# Patient Record
Sex: FEMALE | Race: WHITE | NOT HISPANIC OR LATINO | Employment: FULL TIME | URBAN - METROPOLITAN AREA
[De-identification: names, ages, dates, MRNs, and addresses within clinical notes are randomized per-mention and may not be internally consistent; named-entity substitution may affect disease eponyms.]

---

## 2017-06-16 DIAGNOSIS — Z00.00 ROUTINE GENERAL MEDICAL EXAMINATION AT A HEALTH CARE FACILITY: Primary | ICD-10-CM

## 2017-07-25 ENCOUNTER — TELEPHONE (OUTPATIENT)
Dept: OBSTETRICS AND GYNECOLOGY | Facility: CLINIC | Age: 27
End: 2017-07-25

## 2017-07-25 NOTE — TELEPHONE ENCOUNTER
----- Message from Anisha Marmolejo NP sent at 7/25/2017  8:56 AM CDT -----  Regarding: appt tomorrow  Please make sure pt is aware that she is not due for her annual exam until November 2017.     Thanks  anisha

## 2017-07-25 NOTE — TELEPHONE ENCOUNTER
"Attempted to contact pt. to inform her that he annual is not due until 11/2017. Informed pt that her insurance may not cover her annual until it's due but if she's having any problems are discomfort she can schedule a "problem visit" instead of an annual. No answer. Left message for pt to call clinic back.  "

## 2017-07-26 ENCOUNTER — HOSPITAL ENCOUNTER (OUTPATIENT)
Dept: PULMONOLOGY | Facility: CLINIC | Age: 27
Discharge: HOME | End: 2017-07-26

## 2017-07-26 ENCOUNTER — HOSPITAL ENCOUNTER (OUTPATIENT)
Dept: CARDIOLOGY | Facility: CLINIC | Age: 27
Discharge: HOME | End: 2017-07-26

## 2017-07-26 ENCOUNTER — CLINICAL SUPPORT (OUTPATIENT)
Dept: INTERNAL MEDICINE | Facility: CLINIC | Age: 27
End: 2017-07-26

## 2017-07-26 ENCOUNTER — OFFICE VISIT (OUTPATIENT)
Dept: PULMONOLOGY | Facility: CLINIC | Age: 27
End: 2017-07-26

## 2017-07-26 ENCOUNTER — CLINICAL SUPPORT (OUTPATIENT)
Dept: AUDIOLOGY | Facility: CLINIC | Age: 27
End: 2017-07-26

## 2017-07-26 DIAGNOSIS — Z01.10 ENCOUNTER FOR HEARING EXAMINATION WITHOUT ABNORMAL FINDINGS: Primary | ICD-10-CM

## 2017-07-26 DIAGNOSIS — Z00.00 ROUTINE GENERAL MEDICAL EXAMINATION AT A HEALTH CARE FACILITY: Primary | ICD-10-CM

## 2017-07-26 DIAGNOSIS — Z00.00 ROUTINE GENERAL MEDICAL EXAMINATION AT A HEALTH CARE FACILITY: ICD-10-CM

## 2017-07-26 DIAGNOSIS — Z00.00 ANNUAL PHYSICAL EXAM: Primary | ICD-10-CM

## 2017-07-26 LAB
PRE FEV1 FVC: 89
PRE FEV1: 3.08
PRE FVC: 3.47
PREDICTED FEV1 FVC: 85
PREDICTED FEV1: 3.2
PREDICTED FVC: 3.77

## 2017-07-26 PROCEDURE — 93000 ELECTROCARDIOGRAM COMPLETE: CPT | Mod: S$GLB,,, | Performed by: INTERNAL MEDICINE

## 2017-07-26 PROCEDURE — 92552 PURE TONE AUDIOMETRY AIR: CPT | Mod: ,,, | Performed by: AUDIOLOGIST

## 2017-07-26 PROCEDURE — 94010 BREATHING CAPACITY TEST: CPT | Mod: ,,, | Performed by: INTERNAL MEDICINE

## 2017-07-26 PROCEDURE — 99999 PR PBB SHADOW E&M-EST. PATIENT-LVL II: CPT | Mod: PBBFAC,,, | Performed by: INTERNAL MEDICINE

## 2017-07-26 PROCEDURE — 99385 PREV VISIT NEW AGE 18-39: CPT | Mod: ,,, | Performed by: INTERNAL MEDICINE

## 2017-07-26 NOTE — PROGRESS NOTES
Subjective:       Patient ID: Haley Henriquez is a 27 y.o. female.    Chief Complaint: Annual Exam    HPI  28 yo employee of [employer] comes for her periodic health exam.She is originally from [place], [place]. Went to the [facility] and has been in [place] for the past year.She feels well and has no medical complaints.  Review of Systems   Constitutional: Negative.    HENT: Negative.    Eyes: Negative.    Respiratory: Negative.    Cardiovascular: Negative.    Gastrointestinal: Negative.    Genitourinary: Negative.    Musculoskeletal: Negative.    Skin: Negative.    Neurological: Negative.    Psychiatric/Behavioral: Negative.    All other systems reviewed and are negative.      Objective:      Physical Exam   Constitutional: She is oriented to person, place, and time. She appears well-developed and well-nourished. No distress.   HENT:   Head: Normocephalic and atraumatic.   Right Ear: External ear normal.   Left Ear: External ear normal.   Nose: Nose normal.   Mouth/Throat: Oropharynx is clear and moist.   Sonogram is normal   Eyes: Conjunctivae and EOM are normal. Pupils are equal, round, and reactive to light.   Neck: Normal range of motion. Neck supple. No JVD present. No thyromegaly present.   Cardiovascular: Normal rate, regular rhythm, normal heart sounds and intact distal pulses.  Exam reveals no gallop.    No murmur heard.  EKG is normal   Pulmonary/Chest: Breath sounds normal. No stridor. No respiratory distress. She has no wheezes. She has no rales. She exhibits no tenderness.   Peak flow 440 l/min   Abdominal: Soft. Bowel sounds are normal. She exhibits no distension and no mass. There is no tenderness. There is no rebound and no guarding.   Musculoskeletal: Normal range of motion. She exhibits no edema.   Lymphadenopathy:     She has no cervical adenopathy.   Neurological: She is alert and oriented to person, place, and time. She has normal reflexes. She displays normal  reflexes. No cranial nerve deficit.   Skin: Skin is warm and dry. No rash noted.   Psychiatric: She has a normal mood and affect. Her behavior is normal. Judgment and thought content normal.   Nursing note and vitals reviewed.      Assessment:       No diagnosis found.    Plan:        Labs: Triglycerides, 173 and cholesterol, 201, the [employer] minimally elevated All other parameters are normal. PFT's normal, EKG is normal and audiogram is normal.

## 2017-07-26 NOTE — PROGRESS NOTES
Normal hearing bilaterally.  Recommend a yearly hearing evaluation and hearing protection when needed.

## 2017-09-15 DIAGNOSIS — Z30.9 ENCOUNTER FOR CONTRACEPTIVE MANAGEMENT, UNSPECIFIED TYPE: Primary | ICD-10-CM

## 2017-09-15 RX ORDER — LEVONORGESTREL AND ETHINYL ESTRADIOL 0.15-0.03
1 KIT ORAL DAILY
Qty: 90 TABLET | Refills: 0 | Status: CANCELLED | OUTPATIENT
Start: 2017-09-15

## 2017-09-15 NOTE — TELEPHONE ENCOUNTER
----- Message from Muna Loya sent at 9/15/2017 10:45 AM CDT -----  Contact: self  Pt needing a refill of her BC, pt [facility] [address] [facility], she can be reached at [phone].

## 2017-09-15 NOTE — TELEPHONE ENCOUNTER
----- Message from Muna Loya sent at 9/15/2017 10:45 AM CDT -----  Contact: self  Pt needing a refill of her BC, [facility] [address] [facility], she can be reached at [phone].

## 2017-09-18 RX ORDER — LEVONORGESTREL AND ETHINYL ESTRADIOL 0.15-0.03
1 KIT ORAL DAILY
Qty: 90 TABLET | Refills: 0 | Status: SHIPPED | OUTPATIENT
Start: 2017-09-18 | End: 2017-11-27 | Stop reason: SDUPTHER

## 2017-11-07 ENCOUNTER — OFFICE VISIT (OUTPATIENT)
Dept: ORTHOPEDICS | Facility: CLINIC | Age: 27
End: 2017-11-07
Payer: COMMERCIAL

## 2017-11-07 ENCOUNTER — HOSPITAL ENCOUNTER (OUTPATIENT)
Dept: RADIOLOGY | Facility: HOSPITAL | Age: 27
Discharge: HOME | End: 2017-11-07
Attending: PHYSICIAN ASSISTANT
Payer: COMMERCIAL

## 2017-11-07 DIAGNOSIS — M25.561 RIGHT KNEE PAIN, UNSPECIFIED CHRONICITY: Primary | ICD-10-CM

## 2017-11-07 DIAGNOSIS — M25.561 RIGHT KNEE PAIN, UNSPECIFIED CHRONICITY: ICD-10-CM

## 2017-11-07 PROCEDURE — 73560 X-RAY EXAM OF KNEE 1 OR 2: CPT | Mod: 26,59,LT, | Performed by: RADIOLOGY

## 2017-11-07 PROCEDURE — 99203 OFFICE O/P NEW LOW 30 MIN: CPT | Mod: S$PBB,,, | Performed by: PHYSICIAN ASSISTANT

## 2017-11-07 PROCEDURE — 73562 X-RAY EXAM OF KNEE 3: CPT | Mod: 26,RT,, | Performed by: RADIOLOGY

## 2017-11-07 PROCEDURE — 73560 X-RAY EXAM OF KNEE 1 OR 2: CPT | Mod: TC,LT

## 2017-11-07 PROCEDURE — 99999 PR PBB SHADOW E&M-EST. PATIENT-LVL II: CPT | Mod: PBBFAC,,, | Performed by: PHYSICIAN ASSISTANT

## 2017-11-08 NOTE — PROGRESS NOTES
Subjective:      Patient ID: Haley Henriquez is a 27 y.o. female.    Chief Complaint: No chief complaint on file.    HPI    Patient is a 27 year old female who has had a catching sensation in her right knee since July 2017. Patient stated that it is a catching sensation. She denied any trauma. She has not taken any medication for this. She denied numbness or tingling.      Review of Systems   Constitution: Negative for chills and fever.   Cardiovascular: Negative for chest pain.   Respiratory: Negative for cough and shortness of breath.    Skin: Negative for color change, dry skin, itching, nail changes, poor wound healing and rash.   Musculoskeletal:        Right knee pain   Neurological: Negative for dizziness.   Psychiatric/Behavioral: Negative for altered mental status. The patient is not nervous/anxious.    All other systems reviewed and are negative.        Objective:      General    Constitutional: She is oriented to person, place, and time. She appears well-developed and well-nourished. No distress.   HENT:   Head: Atraumatic.   Eyes: Conjunctivae are normal.   Cardiovascular: Normal rate.    Pulmonary/Chest: Effort normal.   Neurological: She is alert and oriented to person, place, and time.   Psychiatric: She has a normal mood and affect. Her behavior is normal.           Right Knee Exam     Inspection   Swelling: absent  Bruising: absent    Range of Motion   The patient has normal right knee ROM.    Tests   Meniscus   Trever:  Medial - positive Lateral - negative  Ligament Examination Lachman: normal (-1 to 2mm) PCL-Posterior Drawer: normal (0 to 2mm)     MCL - Valgus: normal (0 to 2mm)  LCL - Varus: normal    Other   Sensation: normal            RADS: no fracture or dislocation.   Assessment:       Encounter Diagnosis   Name Primary?    Right knee pain, unspecified chronicity Yes          Plan:       Discussed treatment options with patient. At this time we will order an MRi of her knee. She is to  call for results. Follow up is pending results.

## 2017-11-13 ENCOUNTER — TELEPHONE (OUTPATIENT)
Dept: ORTHOPEDICS | Facility: CLINIC | Age: 27
End: 2017-11-13

## 2017-11-13 ENCOUNTER — HOSPITAL ENCOUNTER (OUTPATIENT)
Dept: RADIOLOGY | Facility: HOSPITAL | Age: 27
Discharge: HOME | End: 2017-11-13
Attending: PHYSICIAN ASSISTANT
Payer: COMMERCIAL

## 2017-11-13 DIAGNOSIS — M25.561 RIGHT KNEE PAIN, UNSPECIFIED CHRONICITY: ICD-10-CM

## 2017-11-13 PROCEDURE — 73721 MRI JNT OF LWR EXTRE W/O DYE: CPT | Mod: 26,RT,, | Performed by: RADIOLOGY

## 2017-11-13 PROCEDURE — 73721 MRI JNT OF LWR EXTRE W/O DYE: CPT | Mod: TC,RT

## 2017-11-13 NOTE — TELEPHONE ENCOUNTER
----- Message from Violeta Conklin PA-C sent at 11/13/2017  1:43 PM CST -----  Contact: [email or web address] number  Please make appointment with .   Thanks,   Violeta.     ----- Message -----  From: Aniya Marino MA  Sent: 11/13/2017   8:41 AM  To: Violeta Conklin PA-C        ----- Message -----  From: Laci Morrell  Sent: 11/13/2017   8:31 AM  To: [facility]in [facility] Staff    Pt called in advising that she has completed her MRI.    Thank you

## 2017-11-13 NOTE — TELEPHONE ENCOUNTER
Called and Informed patient of appointment on 11/14/17 at 8:30 am. Patient states verbal understanding and has no further questions.

## 2017-11-14 ENCOUNTER — OFFICE VISIT (OUTPATIENT)
Dept: SPORTS MEDICINE | Facility: CLINIC | Age: 27
End: 2017-11-14
Payer: COMMERCIAL

## 2017-11-14 VITALS
HEIGHT: 67 IN | WEIGHT: 125 LBS | HEART RATE: 62 BPM | SYSTOLIC BLOOD PRESSURE: 106 MMHG | DIASTOLIC BLOOD PRESSURE: 59 MMHG | BODY MASS INDEX: 19.62 KG/M2

## 2017-11-14 DIAGNOSIS — M25.561 ACUTE PAIN OF RIGHT KNEE: Primary | ICD-10-CM

## 2017-11-14 PROCEDURE — 99203 OFFICE O/P NEW LOW 30 MIN: CPT | Mod: S$GLB,,, | Performed by: ORTHOPAEDIC SURGERY

## 2017-11-14 PROCEDURE — 99999 PR PBB SHADOW E&M-EST. PATIENT-LVL III: CPT | Mod: PBBFAC,,, | Performed by: ORTHOPAEDIC SURGERY

## 2017-11-14 NOTE — LETTER
November 16, 2017      Violeta Conklin PA-C  [address]  [facility] [address]           [facility]  [address]  [facility] [address]  Phone: [phone]          Patient: Haley Henriquez   MR Number: [id number]   YOB: 1990   Date of Visit: 11/14/2017       Dear Violeta Conklin:    Thank you for referring Haley Henriquez to me for evaluation. Attached you will find relevant portions of my assessment and plan of care.    If you have questions, please do not hesitate to call me. I look forward to following Haley Henriquez along with you.    Sincerely,    Tomy Johns MD    Enclosure  CC:  No Recipients    If you would like to receive this communication electronically, please contact [email or web address] or [phone] to request more information on [facility] Link access.    For providers and/or their staff who would like to refer a patient to [facility], please contact us through our one-stop-shop provider referral line, [facility] , at [phone].    If you feel you have received this communication in error or would no longer like to receive these types of communications, please e-mail [email or web address]

## 2017-11-14 NOTE — PROGRESS NOTES
CC: Right knee pain    27 y.o. Female with a history of right knee pain who presents for initial evaluation. She has had right knee pain for 4 months. Denies trauma or inciting event but states she was on vacation over the summer and did a lot of walking. Pain is not improved. She feels occasional buckling and clicking. Denies locking or catching however. She has limited work outs because of the pain and concern that she may injure herself further.  She states that the pain is severe and not responding to any conservative care.      She reports that the pain and weakness. It does not bother her at night.    pos mechanical symptoms, pos instability    Is affecting ADLs.  Pain is 5/10 at it's worst.    REVIEW OF SYSTEMS:   Constitution: Negative. Negative for chills, fever and night sweats.   HENT: Negative for congestion and headaches.    Eyes: Negative for blurred vision, left vision loss and right vision loss.   Cardiovascular: Negative for chest pain and syncope.   Respiratory: Negative for cough and shortness of breath.    Endocrine: Negative for polydipsia, polyphagia and polyuria.   Hematologic/Lymphatic: Negative for bleeding problem. Does not bruise/bleed easily.   Skin: Negative for dry skin, itching and rash.   Musculoskeletal: Negative for falls. Positive for right knee pain and  muscle weakness.   Gastrointestinal: Negative for abdominal pain and bowel incontinence.   Genitourinary: Negative for bladder incontinence and nocturia.   Neurological: Negative for disturbances in coordination, loss of balance and seizures.   Psychiatric/Behavioral: Negative for depression. The patient does not have insomnia.    Allergic/Immunologic: Negative for hives and persistent infections.     PAST MEDICAL HISTORY:   Past Medical History:   Diagnosis Date    Asthma     BRCA2 positive        PAST SURGICAL HISTORY:   Past Surgical History:   Procedure Laterality Date    COLPOSCOPY         FAMILY HISTORY:   Family History  "  Problem Relation Age of Onset    Breast cancer Maternal Aunt     BRCA 1/2 Mother     Ovarian cancer Neg Hx     Colon cancer Neg Hx        SOCIAL HISTORY:   Social History     Social History    Marital status: Single     Spouse name: N/A    Number of children: N/A    Years of education: N/A     Occupational History    Not on file.     Social History Main Topics    Smoking status: Never Smoker    Smokeless tobacco: Never Used    Alcohol use Yes    Drug use: No    Sexual activity: Yes     Partners: Male     Birth control/ protection: OCP     Other Topics Concern    Not on file     Social History Narrative    No narrative on file       MEDICATIONS:     Current Outpatient Prescriptions:     MARLISSA 0.15-0.03 mg per tablet, Take 1 tablet by mouth once daily., Disp: 90 tablet, Rfl: 0    ALLERGIES:   Review of patient's allergies indicates:  No Known Allergies    VITAL SIGNS:   BP (!) 106/59   Pulse 62   Ht 5' 7" (1.702 m)   Wt 56.7 kg (125 lb)   BMI 19.58 kg/m²      PHYSICAL EXAMINATION:  General:  The patient is alert and oriented x 3.  Mood is pleasant.  Observation of ears, eyes and nose reveal no gross abnormalities.  No labored breathing observed.    RIGHT KNEE EXAMINATION     OBSERVATION / INSPECTION   Gait:   Nonantalgic   Alignment:  Neutral   Scars:   None   Muscle atrophy: Mild  Effusion:  None   Warmth:  None   Discoloration:   none     TENDERNESS / CREPITUS (T / C):          T / C      T / C   Patella   - / -   Lateral joint line   +/ -   Peripatellar medial  -  Medial joint line    - / -   Peripatellar lateral +  Medial plica   - / -   Patellar tendon -   Popliteal fossa  - / -   Quad tendon   -   Gastrocnemius   -   Prepatellar Bursa - / -   Quadricep   -   Tibial tubercle  -  Thigh/hamstring  -   Pes anserine/HS -  Fibula    -   ITB   - / -  Tibia     -   Tib/fib joint  - / -  LCL    -     MFC   - / -   MCL: Proximal  -    LFC   - / -    Distal   -          ROM: (* = " pain)  PASSIVE   ACTIVE    Left :   5 / 0 / 145   5 / 0 / 145     Right :    0* / 2 / 145   0* / 2 / 145    Patellofemoral examination:  See above noted areas of tenderness.   Patella position    Subluxation / dislocation: Centered           Sup. / Inf;   Normal   Crepitus (PF):    Absent   Patellar Mobility:       Medial-lateral:   Normal    Superior-inferior:  Normal    Inferior tilt   Normal    Patellar tendon:  Normal   Lateral tilt:    Normal   J-sign:     None   Patellofemoral grind:   No pain       MENISCAL SIGNS:     Pain on terminal extension:  +  Pain on terminal flexion:  -  Trevers maneuver:  + (for pain lateral meniscus)  Squat     - (for pain)    LIGAMENT EXAMINATION:  ACL / Lachman:  normal (-1 to 2mm)    PCL-Post.  drawer: normal 0 to 2mm  MCL- Valgus:  normal 0 to 2mm  LCL- Varus:  normal 0 to 2mm  Pivot shift: normal (Equal)   Dial Test: difference c/w other side   At 30° flexion: normal (< 5°)    At 90° flexion: normal (< 5°)   Reverse Pivot Shift:   normal (Equal)     STRENGTH: (* = with pain) PAINFUL SIDE   Quadricep   5/5   Hamstrin/5    EXTREMITY NEURO-VASCULAR EXAMINATION:   Sensation:  Grossly intact to light touch all dermatomal regions.   Motor Function:  Fully intact motor function at hip, knee, foot and ankle    DTRs;  quadriceps and  achilles 2+.  No clonus and downgoing Babinski.    Vascular status:  DP and PT pulses 2+, brisk capillary refill, symmetric.     OTHER FINDINGS:  none    IMAGING:    X-rays including standing, weight bearing AP and flexion bilateral knees, lateral and merchant views ordered and images reviewed by me show:    No fracture, dislocation or other pathology   Medial compartment: no degenerative changes   Lateral compartment: no degenerative changes   Patellofemoral compartment: no degenerative changes     MRI shows possible anterior horn lateral meniscus tear       ASSESSMENT:    Right Knee Pain, Probable anterior horn lateral meniscus tear    PLAN:    1. Discussed surgery vs PT and injections. Patient has tried OTC and activity modification without relief, currently affecting ADL  2. Treatment options were discussed with the patient about her knee.  I reviewed the MRI images with her, including the relevant anatomy, and what this means for his knee.    We discussed both non-operative and operative options for her knee and the risks and benefits of each.    I feel like she would benefit from surgery for her knee.  After a discussion of specific risks and benefits, she would like to proceed with setting this up.    These risks include: bleeding, infection, scarring, damage to neurovascular structures, damage to cartilage, stiffness, blood clots, pulmonary embolism, swelling, compartment syndrome, need for further surgery, and the risks of anesthesia.      She verbalized her understanding of these risks and wished to proceed with surgery.    A total of 25 minutes were spent face-to-face with the patient during this encounter and over half of that time was spent on counseling about treatment options including his choice for surgery and coordination of her care for her preoperative visits, surgery and post-operative rehab.  We also had a detailed discussion of her expectation level for this procedure as well as any limitations at home or work and with exercising following surgery.     The operative plan is:    right   1. Arthroscopic partial lateral meniscectomy vs repair  2. Possible arthroscopic synovectomy  3. Possible arthroscopic loose body removal  4. Possible arthroscopic chondroplasty    Position: Supine    Patient will not  need medical clearance prior to the pre-operative appointment.    If she wishes to proceed, we'll get her scheduled for this at her convenience around her work schedule.      All questions were answered, pt will contact us for questions or concerns in the interim.

## 2017-11-16 DIAGNOSIS — M94.261 CHONDROMALACIA OF RIGHT KNEE: ICD-10-CM

## 2017-11-16 DIAGNOSIS — M23.200 OLD TEAR OF LATERAL MENISCUS OF RIGHT KNEE, UNSPECIFIED TEAR TYPE: Primary | ICD-10-CM

## 2017-11-17 ENCOUNTER — OFFICE VISIT (OUTPATIENT)
Dept: SPORTS MEDICINE | Facility: CLINIC | Age: 27
End: 2017-11-17
Payer: COMMERCIAL

## 2017-11-17 VITALS
BODY MASS INDEX: 19.62 KG/M2 | HEART RATE: 70 BPM | HEIGHT: 67 IN | WEIGHT: 125 LBS | SYSTOLIC BLOOD PRESSURE: 105 MMHG | DIASTOLIC BLOOD PRESSURE: 58 MMHG

## 2017-11-17 DIAGNOSIS — M23.200 OLD TEAR OF LATERAL MENISCUS OF RIGHT KNEE, UNSPECIFIED TEAR TYPE: Primary | ICD-10-CM

## 2017-11-17 DIAGNOSIS — M94.261 CHONDROMALACIA OF RIGHT KNEE: ICD-10-CM

## 2017-11-17 PROCEDURE — 99999 PR PBB SHADOW E&M-EST. PATIENT-LVL III: CPT | Mod: PBBFAC,,, | Performed by: PHYSICIAN ASSISTANT

## 2017-11-17 PROCEDURE — 99499 UNLISTED E&M SERVICE: CPT | Mod: S$GLB,,, | Performed by: PHYSICIAN ASSISTANT

## 2017-11-17 NOTE — H&P
Haley Henriquez  is here for a completion of her perioperative paperwork. she  Is scheduled to undergo     right   1. Arthroscopic partial lateral meniscectomy vs repair  2. Possible arthroscopic synovectomy  3. Possible arthroscopic loose body removal  4. Possible arthroscopic chondroplasty    on 12/8/17.  She is a healthy individual and does not need clearance for this procedure.     Risks, indications and benefits of the surgical procedure were discussed with the patient. All questions with regard to surgery, rehab, expected return to functional activities, activities of daily living and recreational endeavors were answered to her satisfaction.    Once no other questions were asked, a brief history and physical exam was then performed.      PHYSICAL EXAM:  GEN: A&Ox3, WD WN NAD  HEENT: WNL  CHEST: CTAB, no W/R/R  HEART: RRR, no M/R/G  ABD: Soft, NT ND, BS x4 QUADS  MS; See Epic  NEURO: CN II-XII intact       The surgical consent was then reviewed with the patient, who agreed with all the contents of the consent form and it was signed. she was then given the [facility] surgery packet to bring with her to [facility] for the anesthesia portion of her perioperative paperwork.     PHYSICAL THERAPY:  She was also instructed regarding physical therapy and will begin on  POD #1. She was given a copy of the original prescription to schedule. Another copy of this prescription was also faxed to [facility].    POST OP CARE:instructions were reviewed including care of the wound and dressing after surgery and when she can shower.     PAIN MANAGEMENT: Haley Henriquez was also given her pain management regime, which includes the TENS unit given to her by Luis M along with the education required for its use. She was also instructed regarding the Polar ice unit that will be in place after surgery and her postoperative pain medications.     PAIN MEDICATION:  Percocet 10/325mg 1 po q 4-6 hours prn pain  Ultram 50 mg  one p.o. q.4-6 hours p.r.n. breakthrough pain,   Phenergan 25 mg one p.o. q.4-6 hours p.r.n. nausea and vomiting.  Aspirin 325mg BID x 3 weeks    Patient was instructed to purchase and take Colace to counter possible GI side effects of taking opiates.     DVT prophylaxis was discussed with the patient today including risk factors for developing DVTs and history of DVTs. The patient was asked if any specific recommendations were given from the doctor/s that did pre-operative surgical clearance.      The below listed DVT prophylaxis regimen along with bilateral LEV compression stockings will be used post-op.      Aspirin 325mg BID x 3 weeks for DVT prophylaxis starting on the evening after surgery.    Patient will also use bilateral TEDs on lower extremities, SCDs during surgery, and early ambulation post-op.     Patient was also told to buy over the counter Prilosec medication and take it once daily for GI protection as long as they are taking NSAIDs or Aspirin.     The patient was told that narcotic pain medications may make them drowsy and instructions were given to not sign legal documents, drive or operate heavy machinery, cars, or equipment while under the influence of narcotic medications.     As there were no other questions to be asked, she was given my business card along with Tomy Johns MD business card if she has any questions or concerns prior to surgery or in the postop period.

## 2017-11-19 RX ORDER — ASPIRIN 325 MG
325 TABLET ORAL 2 TIMES DAILY
Qty: 42 TABLET | Refills: 0 | Status: SHIPPED | OUTPATIENT
Start: 2017-11-19 | End: 2018-12-12

## 2017-11-19 RX ORDER — PROMETHAZINE HYDROCHLORIDE 25 MG/1
25 TABLET ORAL EVERY 6 HOURS PRN
Qty: 40 TABLET | Refills: 0 | Status: SHIPPED | OUTPATIENT
Start: 2017-11-19 | End: 2018-12-12

## 2017-11-19 RX ORDER — TRAMADOL HYDROCHLORIDE 50 MG/1
50 TABLET ORAL EVERY 6 HOURS PRN
Qty: 40 TABLET | Refills: 0 | Status: SHIPPED | OUTPATIENT
Start: 2017-11-19 | End: 2018-12-12

## 2017-11-19 RX ORDER — HYDROCODONE BITARTRATE AND ACETAMINOPHEN 10; 325 MG/1; MG/1
1 TABLET ORAL EVERY 6 HOURS PRN
Qty: 45 TABLET | Refills: 0 | Status: SHIPPED | OUTPATIENT
Start: 2017-11-19 | End: 2018-12-12

## 2017-11-20 ENCOUNTER — ANESTHESIA EVENT (OUTPATIENT)
Dept: SURGERY | Facility: OTHER | Age: 27
End: 2017-11-20
Payer: COMMERCIAL

## 2017-11-20 ENCOUNTER — HOSPITAL ENCOUNTER (OUTPATIENT)
Dept: PREADMISSION TESTING | Facility: OTHER | Age: 27
Discharge: HOME | End: 2017-11-20
Attending: ORTHOPAEDIC SURGERY
Payer: COMMERCIAL

## 2017-11-20 VITALS
SYSTOLIC BLOOD PRESSURE: 97 MMHG | HEIGHT: 66 IN | TEMPERATURE: 98 F | HEART RATE: 61 BPM | DIASTOLIC BLOOD PRESSURE: 59 MMHG | WEIGHT: 125 LBS | OXYGEN SATURATION: 98 % | BODY MASS INDEX: 20.09 KG/M2

## 2017-11-20 RX ORDER — FAMOTIDINE 20 MG/1
20 TABLET, FILM COATED ORAL
Status: CANCELLED | OUTPATIENT
Start: 2017-11-20 | End: 2017-11-20

## 2017-11-20 RX ORDER — FLUTICASONE PROPIONATE AND SALMETEROL 250; 50 UG/1; UG/1
1 POWDER RESPIRATORY (INHALATION) 2 TIMES DAILY
COMMUNITY
End: 2019-02-05

## 2017-11-20 RX ORDER — ALBUTEROL SULFATE 90 UG/1
2 AEROSOL, METERED RESPIRATORY (INHALATION) EVERY 6 HOURS PRN
COMMUNITY
End: 2019-12-16 | Stop reason: SDUPTHER

## 2017-11-20 RX ORDER — SODIUM CHLORIDE, SODIUM LACTATE, POTASSIUM CHLORIDE, CALCIUM CHLORIDE 600; 310; 30; 20 MG/100ML; MG/100ML; MG/100ML; MG/100ML
INJECTION, SOLUTION INTRAVENOUS CONTINUOUS
Status: CANCELLED | OUTPATIENT
Start: 2017-11-20

## 2017-11-20 RX ORDER — LIDOCAINE HYDROCHLORIDE 10 MG/ML
1 INJECTION, SOLUTION EPIDURAL; INFILTRATION; INTRACAUDAL; PERINEURAL ONCE
Status: CANCELLED | OUTPATIENT
Start: 2017-11-20 | End: 2017-11-20

## 2017-11-20 RX ORDER — ALBUTEROL SULFATE 0.83 MG/ML
2.5 SOLUTION RESPIRATORY (INHALATION)
Status: CANCELLED | OUTPATIENT
Start: 2017-11-20 | End: 2017-11-20

## 2017-11-20 NOTE — PRE-PROCEDURE INSTRUCTIONS
"Reviewed 's knee arthroscopy discharge instructions and demonstrated the postoperative equipment (polar ice and BREG t-scope knee brace), with verbalized understanding per patient.Reviewed          crutch teaching with verbalized understanding and  return demonstration per patient.  Fitted for crutches and hand  adjusted.  Height of crutches set on 5'7" and the height of the hand  placed on the 2nd hole from the top of the crutch..                         .  "

## 2017-11-20 NOTE — DISCHARGE INSTRUCTIONS
PRE-ADMIT TESTING -  [phone]    [address]  [facility]          Your surgery has been scheduled at [facility]. We are pleased to have the opportunity to serve you. For Further Information please call [phone].    On the day of surgery please report to the Information Desk on the 1st floor.    · CONTACT YOUR PHYSICIAN'S OFFICE THE DAY PRIOR TO YOUR SURGERY TO OBTAIN YOUR ARRIVAL TIME.     · The evening before surgery do not eat anything after 9 p.m. ( this includes hard candy, chewing gum and mints).  You may only have GATORADE, POWERADE AND WATER  from 9 p.m. until you leave your home.   DO NOT DRINK ANY LIQUIDS ON THE WAY TO THE HOSPITAL.      SPECIAL MEDICATION INSTRUCTIONS: TAKE medications checked off by the Anesthesiologist on your Medication List.    Angiogram Patients: Take medications as instructed by your physician, including aspirin.     Surgery Patients:    If you take ASPIRIN - Your PHYSICIAN/SURGEON will need to inform you IF/OR when you need to stop taking aspirin prior to your surgery.     Do Not take any medications containing IBUPROFEN.  Do Not Wear any make-up or dark nail polish   (especially eye make-up) to surgery. If you come to surgery with makeup on you will be required to remove the makeup or nail polish.    Do not shave your surgical area at least 5 days prior to your surgery. The surgical prep will be performed at the hospital according to Infection Control regulations.    Leave all valuables at home.   Do Not wear any jewelry or watches, including any metal in body piercings.  Contact Lens must be removed before surgery. Either do not wear the contact lens or bring a case and solution for storage.  Please bring a container for eyeglasses or dentures as required.  Bring any paperwork your physician has provided, such as consent forms,  history and physicals, doctor's orders, etc.   Bring comfortable clothes that are loose fitting to wear upon  discharge. Take into consideration the type of surgery being performed.  Maintain your diet as advised per your physician the day prior to surgery.      Adequate rest the night before surgery is advised.   Park in the Parking lot behind the hospital or in the [facility] across the street from the parking lot. Parking is complimentary.  If you will be discharged the same day as your procedure, please arrange for a responsible adult to drive you home or to accompany you if traveling by taxi.   YOU WILL NOT BE PERMITTED TO DRIVE OR TO LEAVE THE HOSPITAL ALONE AFTER SURGERY.   It is strongly recommended that you arrange for someone to remain with you for the first 24 hrs following your surgery.       Thank you for your cooperation.  The Staff of [facility].        Bathing Instructions                                                                 Please shower the evening before and morning of your procedure with    ANTIBACTERIAL SOAP. ( DIAL, etc )  Concentrate on the surgical area   for at least 3 minutes and rinse completely. Dry off as usual.   Do not use any deodorant, powder, body lotions, perfume, after shave or    cologne.

## 2017-11-20 NOTE — ANESTHESIA PREPROCEDURE EVALUATION
11/20/2017  Haley Henriquez is a 27 y.o., female.    Anesthesia Evaluation    I have reviewed the Patient Summary Reports.    I have reviewed the Nursing Notes.   I have reviewed the Medications.     Review of Systems  Anesthesia Hx:  Denies Family Hx of Anesthesia complications.   Denies Personal Hx of Anesthesia complications.   Social:  Non-Smoker    Hematology/Oncology:  Hematology Normal   Oncology Normal     EENT/Dental:EENT/Dental Normal   Cardiovascular:  Cardiovascular Normal     Pulmonary:   Asthma (seasonal, worse in winter) mild    Renal/:  Renal/ Normal     Hepatic/GI:  Hepatic/GI Normal    Musculoskeletal:  Musculoskeletal Normal    Neurological:  Neurology Normal    Endocrine:  Endocrine Normal    Dermatological:  Skin Normal    Psych:  Psychiatric Normal           Physical Exam  General:  Well nourished    Airway/Jaw/Neck:  Airway Findings: Mouth Opening: Normal Mallampati: I  TM Distance: 4 - 6 cm  Jaw/Neck Findings:  Neck ROM: Normal ROM      Dental:  Dental Findings: In tact        Mental Status:  Mental Status Findings:  Cooperative, Alert and Oriented         Anesthesia Plan  Type of Anesthesia, risks & benefits discussed:  Anesthesia Type:  general  Patient's Preference:   Intra-op Monitoring Plan: standard ASA monitors  Intra-op Monitoring Plan Comments:   Post Op Pain Control Plan:   Post Op Pain Control Plan Comments:   Induction:   IV  Beta Blocker:         Informed Consent: Patient understands risks and agrees with Anesthesia plan.  Questions answered. Anesthesia consent signed with patient.  ASA Score: 2     Day of Surgery Review of History & Physical:    H&P update referred to the surgeon.         Ready For Surgery From Anesthesia Perspective.

## 2017-11-27 ENCOUNTER — OFFICE VISIT (OUTPATIENT)
Dept: OBSTETRICS AND GYNECOLOGY | Facility: CLINIC | Age: 27
End: 2017-11-27
Attending: OBSTETRICS & GYNECOLOGY
Payer: COMMERCIAL

## 2017-11-27 VITALS — SYSTOLIC BLOOD PRESSURE: 90 MMHG | DIASTOLIC BLOOD PRESSURE: 60 MMHG | WEIGHT: 127.88 LBS | BODY MASS INDEX: 20.64 KG/M2

## 2017-11-27 DIAGNOSIS — Z01.419 VISIT FOR GYNECOLOGIC EXAMINATION: Primary | ICD-10-CM

## 2017-11-27 DIAGNOSIS — Z15.09 BRCA2 POSITIVE: ICD-10-CM

## 2017-11-27 DIAGNOSIS — Z15.01 BRCA2 POSITIVE: ICD-10-CM

## 2017-11-27 DIAGNOSIS — Z30.41 ENCOUNTER FOR SURVEILLANCE OF CONTRACEPTIVE PILLS: ICD-10-CM

## 2017-11-27 PROCEDURE — 99999 PR PBB SHADOW E&M-EST. PATIENT-LVL III: CPT | Mod: PBBFAC,,, | Performed by: OBSTETRICS & GYNECOLOGY

## 2017-11-27 PROCEDURE — 99395 PREV VISIT EST AGE 18-39: CPT | Mod: S$GLB,,, | Performed by: OBSTETRICS & GYNECOLOGY

## 2017-11-27 RX ORDER — LEVONORGESTREL AND ETHINYL ESTRADIOL 0.15-0.03
1 KIT ORAL DAILY
Qty: 90 TABLET | Refills: 3 | Status: SHIPPED | OUTPATIENT
Start: 2017-11-27 | End: 2018-10-14 | Stop reason: SDUPTHER

## 2017-11-27 NOTE — PROGRESS NOTES
CC: Well woman exam    Haley Henriquez is a 27 y.o. female  presents for a well woman exam.  She has no issues, problems, or complaints.    She is BRCA2 positive.  She had a MMG last year with her well woman exam      Past Medical History:   Diagnosis Date    Asthma     BRCA2 positive        Past Surgical History:   Procedure Laterality Date    COLPOSCOPY         OB History    Para Term  AB Living   0 0 0 0 0 0   SAB TAB Ectopic Multiple Live Births   0 0 0 0               Family History   Problem Relation Age of Onset    Breast cancer Maternal Aunt     BRCA 1/2 Mother     Ovarian cancer Maternal Grandmother     Colon cancer Neg Hx        Social History   Substance Use Topics    Smoking status: Never Smoker    Smokeless tobacco: Never Used    Alcohol use Yes      Comment: socially       BP 90/60   Wt 58 kg (127 lb 13.9 oz)   LMP 2017 (Approximate)   BMI 20.64 kg/m²     ROS:  GENERAL: Denies weight gain or weight loss. Feeling well overall.   SKIN: Denies rash or lesions.   HEAD: Denies head injury or headache.   NODES: Denies enlarged lymph nodes.   CHEST: Denies chest pain or shortness of breath.   CARDIOVASCULAR: Denies palpitations or left sided chest pain.   ABDOMEN: No abdominal pain, constipation, diarrhea, nausea, vomiting or rectal bleeding.   URINARY: No frequency, dysuria, hematuria, or burning on urination.  REPRODUCTIVE: See HPI.   BREASTS: The patient performs breast self-examination and denies pain, lumps, or nipple discharge.   HEMATOLOGIC: No easy bruisability or excessive bleeding.  MUSCULOSKELETAL: Denies joint pain or swelling.   NEUROLOGIC: Denies syncope or weakness.   PSYCHIATRIC: Denies depression, anxiety or mood swings.    Physical Exam:    APPEARANCE: Well nourished, well developed, in no acute distress.  AFFECT: WNL, alert and oriented x 3  SKIN: No acne or hirsutism  NECK: Neck symmetric without masses or thyromegaly  NODES: No inguinal,  cervical, axillary, or femoral lymph node enlargement  CHEST: Good respiratory effect  ABDOMEN: Soft.  No tenderness or masses.  No hepatosplenomegaly.  No hernias.  BREASTS: Symmetrical, no skin changes or visible lesions.  No palpable masses, nipple discharge bilaterally.  PELVIC: Normal external genitalia without lesions.  Normal hair distribution.  Adequate perineal body, normal urethral meatus.  Vagina moist and well rugated without lesions or discharge.  Cervix pink, without lesions, discharge or tenderness.  No significant cystocele or rectocele.  Bimanual exam shows uterus to be normal size, regular, mobile and nontender.  Adnexa without masses or tenderness.    EXTREMITIES: No edema.    ASSESSMENT AND PLAN  1. Visit for gynecologic examination     2. BRCA2 positive  MRI Breast Bilateral   3. Encounter for surveillance of contraceptive pills  MARLISSA 0.15-0.03 mg per tablet       Patient was counseled today on A.C.S. Pap guidelines and recommendations for yearly pelvic exams, pap smears every 3 years, and monthly self breast exams; to see her PCP for other health maintenance.     Discussed the surveillance recommendations for women with BRCA 2 for breast cancer:    For women aged 25-29 years with known BRCA mutations, recommended breast cancer surveillance includes clinical breast examination every 6-12 months and annual radiographic screening (preferably, magnetic resonance imaging [MRI] with contrast) (17).   For women aged 30 years and older with known BRCA mutations or other actionable breast cancer mutations, recommended breast cancer surveillance includes annual mammography and annual breast MRI with contrast, often alternating every 6 months (17).     And the use of OCP's to reduce the risk of ovarian cancer.       Return in about 1 year (around 11/27/2018).

## 2017-12-05 ENCOUNTER — HOSPITAL ENCOUNTER (OUTPATIENT)
Dept: RADIOLOGY | Facility: OTHER | Age: 27
Discharge: HOME | End: 2017-12-05
Attending: OBSTETRICS & GYNECOLOGY
Payer: COMMERCIAL

## 2017-12-05 DIAGNOSIS — Z15.01 BRCA2 POSITIVE: ICD-10-CM

## 2017-12-05 DIAGNOSIS — Z15.09 BRCA2 POSITIVE: ICD-10-CM

## 2017-12-05 PROCEDURE — A9577 INJ MULTIHANCE: HCPCS | Performed by: OBSTETRICS & GYNECOLOGY

## 2017-12-05 PROCEDURE — 25500020 PHARM REV CODE 255: Performed by: OBSTETRICS & GYNECOLOGY

## 2017-12-05 PROCEDURE — 77059 MRI BREAST BILATERAL: CPT | Mod: 26,,, | Performed by: STUDENT IN AN ORGANIZED HEALTH CARE EDUCATION/TRAINING PROGRAM

## 2017-12-05 PROCEDURE — 0159T MRI BREAST BILATERAL: CPT | Mod: TC

## 2017-12-05 PROCEDURE — 0159T MRI BREAST BILATERAL: CPT | Mod: 26,,, | Performed by: STUDENT IN AN ORGANIZED HEALTH CARE EDUCATION/TRAINING PROGRAM

## 2017-12-05 RX ADMIN — GADOBENATE DIMEGLUMINE 12 ML: 529 INJECTION, SOLUTION INTRAVENOUS at 09:12

## 2017-12-07 ENCOUNTER — TELEPHONE (OUTPATIENT)
Dept: SPORTS MEDICINE | Facility: CLINIC | Age: 27
End: 2017-12-07

## 2017-12-08 ENCOUNTER — SURGERY (OUTPATIENT)
Age: 27
End: 2017-12-08

## 2017-12-08 ENCOUNTER — HOSPITAL ENCOUNTER (OUTPATIENT)
Facility: OTHER | Age: 27
Discharge: HOME | End: 2017-12-08
Attending: ORTHOPAEDIC SURGERY | Admitting: ORTHOPAEDIC SURGERY
Payer: COMMERCIAL

## 2017-12-08 ENCOUNTER — ANESTHESIA (OUTPATIENT)
Dept: SURGERY | Facility: OTHER | Age: 27
End: 2017-12-08
Payer: COMMERCIAL

## 2017-12-08 VITALS
WEIGHT: 125 LBS | TEMPERATURE: 98 F | HEART RATE: 81 BPM | HEIGHT: 66 IN | DIASTOLIC BLOOD PRESSURE: 58 MMHG | RESPIRATION RATE: 16 BRPM | OXYGEN SATURATION: 99 % | SYSTOLIC BLOOD PRESSURE: 110 MMHG | BODY MASS INDEX: 20.09 KG/M2

## 2017-12-08 DIAGNOSIS — M94.261 CHONDROMALACIA OF RIGHT KNEE: ICD-10-CM

## 2017-12-08 DIAGNOSIS — M23.200 OLD TEAR OF LATERAL MENISCUS OF RIGHT KNEE, UNSPECIFIED TEAR TYPE: ICD-10-CM

## 2017-12-08 LAB
B-HCG UR QL: NEGATIVE
CTP QC/QA: YES

## 2017-12-08 PROCEDURE — 25000003 PHARM REV CODE 250: Performed by: NURSE ANESTHETIST, CERTIFIED REGISTERED

## 2017-12-08 PROCEDURE — 37000008 HC ANESTHESIA 1ST 15 MINUTES: Performed by: ORTHOPAEDIC SURGERY

## 2017-12-08 PROCEDURE — 25000003 PHARM REV CODE 250: Performed by: ANESTHESIOLOGY

## 2017-12-08 PROCEDURE — 27201423 OPTIME MED/SURG SUP & DEVICES STERILE SUPPLY: Performed by: ORTHOPAEDIC SURGERY

## 2017-12-08 PROCEDURE — 63600175 PHARM REV CODE 636 W HCPCS: Performed by: PHYSICIAN ASSISTANT

## 2017-12-08 PROCEDURE — 94640 AIRWAY INHALATION TREATMENT: CPT

## 2017-12-08 PROCEDURE — 36000710: Performed by: ORTHOPAEDIC SURGERY

## 2017-12-08 PROCEDURE — 71000039 HC RECOVERY, EACH ADD'L HOUR: Performed by: ORTHOPAEDIC SURGERY

## 2017-12-08 PROCEDURE — 81025 URINE PREGNANCY TEST: CPT | Performed by: PHYSICIAN ASSISTANT

## 2017-12-08 PROCEDURE — 25000003 PHARM REV CODE 250: Performed by: PHYSICIAN ASSISTANT

## 2017-12-08 PROCEDURE — 90686 IIV4 VACC NO PRSV 0.5 ML IM: CPT | Performed by: ORTHOPAEDIC SURGERY

## 2017-12-08 PROCEDURE — 71000016 HC POSTOP RECOV ADDL HR: Performed by: ORTHOPAEDIC SURGERY

## 2017-12-08 PROCEDURE — 25000242 PHARM REV CODE 250 ALT 637 W/ HCPCS: Performed by: ANESTHESIOLOGY

## 2017-12-08 PROCEDURE — 71000033 HC RECOVERY, INTIAL HOUR: Performed by: ORTHOPAEDIC SURGERY

## 2017-12-08 PROCEDURE — 63600175 PHARM REV CODE 636 W HCPCS: Performed by: ANESTHESIOLOGY

## 2017-12-08 PROCEDURE — 63600175 PHARM REV CODE 636 W HCPCS: Performed by: NURSE ANESTHETIST, CERTIFIED REGISTERED

## 2017-12-08 PROCEDURE — 63600175 PHARM REV CODE 636 W HCPCS: Performed by: ORTHOPAEDIC SURGERY

## 2017-12-08 PROCEDURE — 29881 ARTHRS KNE SRG MNISECTMY M/L: CPT | Mod: RT,,, | Performed by: ORTHOPAEDIC SURGERY

## 2017-12-08 PROCEDURE — 37000009 HC ANESTHESIA EA ADD 15 MINS: Performed by: ORTHOPAEDIC SURGERY

## 2017-12-08 PROCEDURE — 71000015 HC POSTOP RECOV 1ST HR: Performed by: ORTHOPAEDIC SURGERY

## 2017-12-08 PROCEDURE — 90471 IMMUNIZATION ADMIN: CPT | Performed by: ORTHOPAEDIC SURGERY

## 2017-12-08 PROCEDURE — 25000003 PHARM REV CODE 250: Performed by: ORTHOPAEDIC SURGERY

## 2017-12-08 PROCEDURE — 36000711: Performed by: ORTHOPAEDIC SURGERY

## 2017-12-08 RX ORDER — FAMOTIDINE 20 MG/1
20 TABLET, FILM COATED ORAL
Status: COMPLETED | OUTPATIENT
Start: 2017-12-08 | End: 2017-12-08

## 2017-12-08 RX ORDER — MORPHINE SULFATE 10 MG/ML
2 INJECTION INTRAMUSCULAR; INTRAVENOUS; SUBCUTANEOUS EVERY 4 HOURS PRN
Status: DISCONTINUED | OUTPATIENT
Start: 2017-12-08 | End: 2017-12-08 | Stop reason: HOSPADM

## 2017-12-08 RX ORDER — GLYCOPYRROLATE 0.2 MG/ML
INJECTION INTRAMUSCULAR; INTRAVENOUS
Status: DISCONTINUED | OUTPATIENT
Start: 2017-12-08 | End: 2017-12-08

## 2017-12-08 RX ORDER — OXYCODONE HYDROCHLORIDE 5 MG/1
10 TABLET ORAL EVERY 4 HOURS PRN
Status: DISCONTINUED | OUTPATIENT
Start: 2017-12-08 | End: 2017-12-08 | Stop reason: HOSPADM

## 2017-12-08 RX ORDER — FENTANYL CITRATE 50 UG/ML
25 INJECTION, SOLUTION INTRAMUSCULAR; INTRAVENOUS EVERY 5 MIN PRN
Status: COMPLETED | OUTPATIENT
Start: 2017-12-08 | End: 2017-12-08

## 2017-12-08 RX ORDER — ALBUTEROL SULFATE 0.83 MG/ML
2.5 SOLUTION RESPIRATORY (INHALATION)
Status: COMPLETED | OUTPATIENT
Start: 2017-12-08 | End: 2017-12-08

## 2017-12-08 RX ORDER — SODIUM CHLORIDE 0.9 % (FLUSH) 0.9 %
3 SYRINGE (ML) INJECTION
Status: DISCONTINUED | OUTPATIENT
Start: 2017-12-08 | End: 2017-12-08 | Stop reason: HOSPADM

## 2017-12-08 RX ORDER — BUPIVACAINE HYDROCHLORIDE 2.5 MG/ML
INJECTION, SOLUTION EPIDURAL; INFILTRATION; INTRACAUDAL
Status: DISCONTINUED | OUTPATIENT
Start: 2017-12-08 | End: 2017-12-08 | Stop reason: HOSPADM

## 2017-12-08 RX ORDER — PROMETHAZINE HYDROCHLORIDE 25 MG/1
25 TABLET ORAL EVERY 6 HOURS PRN
Status: DISCONTINUED | OUTPATIENT
Start: 2017-12-08 | End: 2017-12-08 | Stop reason: HOSPADM

## 2017-12-08 RX ORDER — SODIUM CHLORIDE, SODIUM LACTATE, POTASSIUM CHLORIDE, CALCIUM CHLORIDE 600; 310; 30; 20 MG/100ML; MG/100ML; MG/100ML; MG/100ML
INJECTION, SOLUTION INTRAVENOUS CONTINUOUS
Status: DISCONTINUED | OUTPATIENT
Start: 2017-12-08 | End: 2017-12-08 | Stop reason: HOSPADM

## 2017-12-08 RX ORDER — PROPOFOL 10 MG/ML
VIAL (ML) INTRAVENOUS
Status: DISCONTINUED | OUTPATIENT
Start: 2017-12-08 | End: 2017-12-08

## 2017-12-08 RX ORDER — OXYCODONE HYDROCHLORIDE 5 MG/1
5 TABLET ORAL
Status: DISCONTINUED | OUTPATIENT
Start: 2017-12-08 | End: 2017-12-08 | Stop reason: HOSPADM

## 2017-12-08 RX ORDER — MEPERIDINE HYDROCHLORIDE 50 MG/ML
12.5 INJECTION INTRAMUSCULAR; INTRAVENOUS; SUBCUTANEOUS ONCE AS NEEDED
Status: DISCONTINUED | OUTPATIENT
Start: 2017-12-08 | End: 2017-12-08 | Stop reason: HOSPADM

## 2017-12-08 RX ORDER — ONDANSETRON 2 MG/ML
4 INJECTION INTRAMUSCULAR; INTRAVENOUS EVERY 12 HOURS PRN
Status: DISCONTINUED | OUTPATIENT
Start: 2017-12-08 | End: 2017-12-08 | Stop reason: HOSPADM

## 2017-12-08 RX ORDER — FENTANYL CITRATE 50 UG/ML
INJECTION, SOLUTION INTRAMUSCULAR; INTRAVENOUS
Status: DISCONTINUED | OUTPATIENT
Start: 2017-12-08 | End: 2017-12-08

## 2017-12-08 RX ORDER — EPINEPHRINE 1 MG/ML
INJECTION, SOLUTION INTRACARDIAC; INTRAMUSCULAR; INTRAVENOUS; SUBCUTANEOUS
Status: DISCONTINUED | OUTPATIENT
Start: 2017-12-08 | End: 2017-12-08 | Stop reason: HOSPADM

## 2017-12-08 RX ORDER — HYDROMORPHONE HYDROCHLORIDE 2 MG/ML
0.4 INJECTION, SOLUTION INTRAMUSCULAR; INTRAVENOUS; SUBCUTANEOUS EVERY 5 MIN PRN
Status: DISCONTINUED | OUTPATIENT
Start: 2017-12-08 | End: 2017-12-08 | Stop reason: HOSPADM

## 2017-12-08 RX ORDER — LIDOCAINE HCL/PF 100 MG/5ML
SYRINGE (ML) INTRAVENOUS
Status: DISCONTINUED | OUTPATIENT
Start: 2017-12-08 | End: 2017-12-08

## 2017-12-08 RX ORDER — ONDANSETRON 2 MG/ML
4 INJECTION INTRAMUSCULAR; INTRAVENOUS DAILY PRN
Status: DISCONTINUED | OUTPATIENT
Start: 2017-12-08 | End: 2017-12-08 | Stop reason: HOSPADM

## 2017-12-08 RX ORDER — LIDOCAINE HYDROCHLORIDE 10 MG/ML
1 INJECTION INFILTRATION; PERINEURAL ONCE
Status: DISCONTINUED | OUTPATIENT
Start: 2017-12-08 | End: 2017-12-08 | Stop reason: HOSPADM

## 2017-12-08 RX ORDER — MIDAZOLAM HYDROCHLORIDE 1 MG/ML
INJECTION INTRAMUSCULAR; INTRAVENOUS
Status: DISCONTINUED | OUTPATIENT
Start: 2017-12-08 | End: 2017-12-08

## 2017-12-08 RX ADMIN — DEXTROSE 2 G: 50 INJECTION, SOLUTION INTRAVENOUS at 12:12

## 2017-12-08 RX ADMIN — EPINEPHRINE 3 ML: 1 INJECTION, SOLUTION INTRAMUSCULAR; SUBCUTANEOUS at 01:12

## 2017-12-08 RX ADMIN — FENTANYL CITRATE 100 MCG: 50 INJECTION, SOLUTION INTRAMUSCULAR; INTRAVENOUS at 12:12

## 2017-12-08 RX ADMIN — FENTANYL CITRATE 25 MCG: 50 INJECTION INTRAMUSCULAR; INTRAVENOUS at 02:12

## 2017-12-08 RX ADMIN — GLYCOPYRROLATE 0.4 MG: 0.2 INJECTION, SOLUTION INTRAMUSCULAR; INTRAVENOUS at 12:12

## 2017-12-08 RX ADMIN — PROPOFOL 100 MG: 10 INJECTION, EMULSION INTRAVENOUS at 01:12

## 2017-12-08 RX ADMIN — ALBUTEROL SULFATE 2.5 MG: 2.5 SOLUTION RESPIRATORY (INHALATION) at 10:12

## 2017-12-08 RX ADMIN — OXYCODONE HYDROCHLORIDE 5 MG: 5 TABLET ORAL at 02:12

## 2017-12-08 RX ADMIN — BUPIVACAINE HYDROCHLORIDE 30 ML: 2.5 INJECTION, SOLUTION EPIDURAL; INFILTRATION; INTRACAUDAL; PERINEURAL at 01:12

## 2017-12-08 RX ADMIN — CARBOXYMETHYLCELLULOSE SODIUM 2 DROP: 2.5 SOLUTION/ DROPS OPHTHALMIC at 12:12

## 2017-12-08 RX ADMIN — FAMOTIDINE 20 MG: 20 TABLET, FILM COATED ORAL at 10:12

## 2017-12-08 RX ADMIN — INFLUENZA A VIRUS A/MICHIGAN/45/2015 X-275 (H1N1) ANTIGEN (FORMALDEHYDE INACTIVATED), INFLUENZA A VIRUS A/HONG KONG/4801/2014 X-263B (H3N2) ANTIGEN (FORMALDEHYDE INACTIVATED), INFLUENZA B VIRUS B/PHUKET/3073/2013 ANTIGEN (FORMALDEHYDE INACTIVATED), AND INFLUENZA B VIRUS B/BRISBANE/60/2008 ANTIGEN (FORMALDEHYDE INACTIVATED) 0.5 ML: 15; 15; 15; 15 INJECTION, SUSPENSION INTRAMUSCULAR at 03:12

## 2017-12-08 RX ADMIN — SODIUM CHLORIDE, SODIUM LACTATE, POTASSIUM CHLORIDE, AND CALCIUM CHLORIDE: 600; 310; 30; 20 INJECTION, SOLUTION INTRAVENOUS at 12:12

## 2017-12-08 RX ADMIN — MIDAZOLAM HYDROCHLORIDE 2 MG: 1 INJECTION, SOLUTION INTRAMUSCULAR; INTRAVENOUS at 01:12

## 2017-12-08 RX ADMIN — LIDOCAINE HYDROCHLORIDE 75 MG: 20 INJECTION, SOLUTION INTRAVENOUS at 12:12

## 2017-12-08 RX ADMIN — MIDAZOLAM HYDROCHLORIDE 2 MG: 1 INJECTION, SOLUTION INTRAMUSCULAR; INTRAVENOUS at 12:12

## 2017-12-08 RX ADMIN — PROPOFOL 160 MG: 10 INJECTION, EMULSION INTRAVENOUS at 12:12

## 2017-12-08 NOTE — INTERVAL H&P NOTE
The patient has been examined and the H&P has been reviewed:    I concur with the findings and no changes have occurred since H&P was written.    Anesthesia/Surgery risks, benefits and alternative options discussed and understood by patient/family.          Active Hospital Problems    Diagnosis  POA    Old tear of lateral meniscus of right knee [M23.200]  Yes      Resolved Hospital Problems    Diagnosis Date Resolved POA   No resolved problems to display.

## 2017-12-08 NOTE — H&P (VIEW-ONLY)
Haley Henriquez  is here for a completion of her perioperative paperwork. she  Is scheduled to undergo     right   1. Arthroscopic partial lateral meniscectomy vs repair  2. Possible arthroscopic synovectomy  3. Possible arthroscopic loose body removal  4. Possible arthroscopic chondroplasty    on 12/8/17.  She is a healthy individual and does not need clearance for this procedure.     Risks, indications and benefits of the surgical procedure were discussed with the patient. All questions with regard to surgery, rehab, expected return to functional activities, activities of daily living and recreational endeavors were answered to her satisfaction.    Once no other questions were asked, a brief history and physical exam was then performed.      PHYSICAL EXAM:  GEN: A&Ox3, WD WN NAD  HEENT: WNL  CHEST: CTAB, no W/R/R  HEART: RRR, no M/R/G  ABD: Soft, NT ND, BS x4 QUADS  MS; See Epic  NEURO: CN II-XII intact       The surgical consent was then reviewed with the patient, who agreed with all the contents of the consent form and it was signed. she was then given the [facility] surgery packet to bring with her to [facility] for the anesthesia portion of her perioperative paperwork.     PHYSICAL THERAPY:  She was also instructed regarding physical therapy and will begin on  POD #1. She was given a copy of the original prescription to schedule. Another copy of this prescription was also faxed to [facility].    POST OP CARE:instructions were reviewed including care of the wound and dressing after surgery and when she can shower.     PAIN MANAGEMENT: Haley Henriquez was also given her pain management regime, which includes the TENS unit given to her by Luis M along with the education required for its use. She was also instructed regarding the Polar ice unit that will be in place after surgery and her postoperative pain medications.     PAIN MEDICATION:  Percocet 10/325mg 1 po q 4-6 hours prn pain  Ultram 50 mg  one p.o. q.4-6 hours p.r.n. breakthrough pain,   Phenergan 25 mg one p.o. q.4-6 hours p.r.n. nausea and vomiting.  Aspirin 325mg BID x 3 weeks    Patient was instructed to purchase and take Colace to counter possible GI side effects of taking opiates.     DVT prophylaxis was discussed with the patient today including risk factors for developing DVTs and history of DVTs. The patient was asked if any specific recommendations were given from the doctor/s that did pre-operative surgical clearance.      The below listed DVT prophylaxis regimen along with bilateral LEV compression stockings will be used post-op.      Aspirin 325mg BID x 3 weeks for DVT prophylaxis starting on the evening after surgery.    Patient will also use bilateral TEDs on lower extremities, SCDs during surgery, and early ambulation post-op.     Patient was also told to buy over the counter Prilosec medication and take it once daily for GI protection as long as they are taking NSAIDs or Aspirin.     The patient was told that narcotic pain medications may make them drowsy and instructions were given to not sign legal documents, drive or operate heavy machinery, cars, or equipment while under the influence of narcotic medications.     As there were no other questions to be asked, she was given my business card along with Tomy Johns MD business card if she has any questions or concerns prior to surgery or in the postop period.

## 2017-12-08 NOTE — OP NOTE
DATE OF PROCEDURE: 12/8/2017    PREOPERATIVE DIAGNOSES:   1. Right knee medial meniscus tear.   2. Right knee medial synovial plica    POSTOPERATIVE DIAGNOSES:   1. Right knee medial meniscus tear.   2. Right knee medial synovial plica    PROCEDURES:   1. Right knee arthroscopic partial medial meniscectomy  2. Right knee arthroscopic medial plica excision    SURGEON: Tomy Johns M.D.     ASSISTANT:   1. Ruth Ann Ware    COMPLICATIONS: None.     POSITION: Supine with arthroscopic leg oliveira.     ANESTHESIA: General endotracheal plus local.     COMPLICATIONS: None.     TOURNIQUET TIME:  None    EBL:  Minimal    EXAMINATION UNDER ANESTHESIA:   Knee: full range of motion, no evidence of instability.     ARTHROSCOPIC FINDINGS:   1. Small tear anterior horn medial meniscus.   2. Intact lateral meniscus  3. Medial plica with trochlea abrasion.    INDICATIONS: The patient is a 27 y.o.-year-old female with a history of right knee pain. MRI confirms a tear in her medial meniscus.  After the risks and benefits of surgery were discussed with the patient, including bleeding, infection, scarring, persistent pain, risk of blood clots (DVT), pulmonary embolism, compartment syndrome, damage to cartilage, damage to neurovascular structures, plus the risks of anesthesia, including, death, stroke, and heart attack, the patient wished to proceed with operative intervention.      DESCRIPTION OF PROCEDURE:     The patient and correct limb were identified and marked in the pre-op holding area.     The patient was brought in the room. After undergoing general endotracheal anesthesia,  she was placed on a well-padded operating table. her right lower extremity was prepped and draped in usual sterile fashion. A nonsterile tourniquet was placed high up around the thigh. A well padded arthroscopic leg oliveira was used during the case. The contralateral leg was placed in a well padded Vish stirrup with bony prominences all being well  padded.      After infiltrating the joint and portal sites with a total of 30 cc of 0.25% marcaine, the standard inferolateral and inferomedial portals were created. Diagnostic arthroscopy performed.     The patellofemoral joint was entered. There was no significant chondromalacia on the trochlea or on the undersurface of the patella.    PLICA EXCISION:  There was a small medial plica along the medial aspect of the trochlea. An straight and curved biter and shaver were used to remove areas of irritating synovium and tight plica band from the overlying trochlea on the medial trochlea to minimize any further abrasion.    PARTIAL MEDIAL MENISCECTOMY:  Attention turned to the medial compartment. Medial femoral condyle was intact. There was a small tear in the anterior horn of the medial meniscus. Using combination of straight and curved biters and arthroscopic shani, the small unstable portion of the anterior horn of the medial meniscus was trimmed down to a stable rim. Approximately 10% of the anterior horn of the medial meniscus was resected down to get this to a stable position.     Attention was turned to the intercondylar notch. The ACL and PCL were intact.     Attention was turned to the lateral compartment. The lateral meniscus was intact and the lateral femoral condyle and lateral tibial plateau were intact.      Portal sites were closed with 3-0 Monocryl, covered with Mastisol, Steri-Strips, Xeroform, 4 x 4 fluffs, ABD pads and thigh-high LEV hose.    The patient was extubated in the room, transferred to Recovery Room in stable condition accompanied by her physician.  There were no complications in the case.     I was present, scrubbed and did perform all critical portions of the case.      PAMELA HAYES MD

## 2017-12-08 NOTE — BRIEF OP NOTE
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]  Old tear of lateral meniscus of right knee, unspecified tear type [M23.200]    Post-op Diagnosis:  Post-Op Diagnosis Codes:     * Chondromalacia of right knee [M94.261]     * Old tear of lateral meniscus of right knee, unspecified tear type [M23.200]    Procedure: Procedure(s) (LRB):  MENISCECTOMY (medial OR lateral) (Right)  CHONDROPLASTY-KNEE (Right)  ARTHROSCOPY-KNEE (Right)  SYNOVECTOMY-KNEE (Right)    Anesthesia: General    Description of the findings of the procedure: See Dictation    Findings/Key Components: see op note    Estimated Blood Loss: * No values recorded between 12/8/2017  1:04 PM and 12/8/2017  1:51 PM *         Specimens:   Specimen (12h ago through future)    None          Disposition: Patient tolerated the procedure well and was transferred to PACU in stable condition.      Discharge Note    SUMMARY     Admit Date: 12/8/2017    Discharge Date and Time:   12/08/2017 2:01 PM    Pre-op Diagnosis:  Chondromalacia of right knee [M94.261]  Old tear of lateral meniscus of right knee, unspecified tear type [M23.200]    Post-op Diagnosis:  Post-Op Diagnosis Codes:     * Chondromalacia of right knee [M94.261]     * Old tear of lateral meniscus of right knee, unspecified tear type [M23.200]    Procedure: Procedure(s) (LRB):  MENISCECTOMY (medial OR lateral) (Right)  CHONDROPLASTY-KNEE (Right)  ARTHROSCOPY-KNEE (Right)  SYNOVECTOMY-KNEE (Right)    Hospital Course (synopsis of major diagnoses, care, treatment, and services provided during the course of the hospital stay): Patient underwent outpatient knee surgery and was transferred to PACU in stable condition.  In PACU, patient received appropriate post-operative care and discharged home with plans for physical therapy  "and follow-up with the operative surgeon.    Diet: Regular       Final Diagnosis: Post-Op Diagnosis Codes:     * Chondromalacia of right knee [M94.261]     * Old tear of lateral meniscus of right knee, unspecified tear type [M23.200]    Disposition: Home or Self Care    Follow Up/Patient Instructions:     Medications:  Reconciled Home Medications:   Current Discharge Medication List      CONTINUE these medications which have NOT CHANGED    Details   fluticasone-salmeterol 250-50 mcg/dose (ADVAIR) 250-50 mcg/dose diskus inhaler Inhale 1 puff into the lungs 2 (two) times daily. Controller      MARLISSA 0.15-0.03 mg per tablet Take 1 tablet by mouth once daily.  Qty: 90 tablet, Refills: 3    Associated Diagnoses: Encounter for surveillance of contraceptive pills      albuterol 90 mcg/actuation inhaler Inhale 2 puffs into the lungs every 6 (six) hours as needed for Wheezing. Rescue      aspirin 325 MG tablet Take 1 tablet (325 mg total) by mouth 2 (two) times daily.  Qty: 42 tablet, Refills: 0      hydrocodone-acetaminophen 10-325mg (NORCO)  mg Tab Take 1 tablet by mouth every 6 (six) hours as needed for Pain.  Qty: 45 tablet, Refills: 0      promethazine (PHENERGAN) 25 MG tablet Take 1 tablet (25 mg total) by mouth every 6 (six) hours as needed for Nausea.  Qty: 40 tablet, Refills: 0      traMADol (ULTRAM) 50 mg tablet Take 1 tablet (50 mg total) by mouth every 6 (six) hours as needed for Pain.  Qty: 40 tablet, Refills: 0             Discharge Procedure Orders  CRUTCHES FOR HOME USE   Order Comments: Provide if needed   Order Specific Question Answer Comments   Type: Axillary    Height: 5' 6" (1.676 m)    Weight: 56.7 kg (125 lb)    Does patient have medical equipment at home? none    Length of need (1-99 months): 24      Diet general     Call MD for:  temperature >100.4     Call MD for:  persistent nausea and vomiting     Call MD for:  severe uncontrolled pain     Call MD for:  difficulty breathing, headache or " visual disturbances     Call MD for:  redness, tenderness, or signs of infection (pain, swelling, redness, odor or green/yellow discharge around incision site)     Call MD for:  hives     Call MD for:  persistent dizziness or light-headedness     Weight bearing as tolerated     Remove dressing in 72 hours       Follow-up Information     Tomy Johns MD.    Specialties:  Sports Medicine, Orthopedic Surgery  Why:  as scheduled pre op  Contact information:  [facility] [facility] [place] [address]  [phone]

## 2017-12-08 NOTE — PLAN OF CARE
Haley Henriquez has met all discharge criteria from Phase II. Vital Signs are stable, ambulating  without difficulty. Discharge instructions given, patient verbalized understanding. Discharged from facility via wheelchair in stable condition.

## 2017-12-08 NOTE — DISCHARGE INSTRUCTIONS
Discharge Instructions: After Your Surgery  Youve just had surgery. During surgery, you were given medicine called anesthesia to keep you relaxed and free of pain. After surgery, you may have some pain or nausea. This is common. Here are some tips for feeling better and getting well after surgery.     Stay on schedule with your medicine.     Going home  Your healthcare provider will show you how to take care of yourself when you go home. He or she will also answer your questions. Have an adult family member or friend drive you home. For the first 24 hours after your surgery:    · Do not drive or use heavy equipment.  · Do not make important decisions or sign legal papers.  · Do not drink alcohol.  · Have someone stay with you, if needed. He or she can watch for problems and help keep you safe.    Be sure to go to all follow-up visits with your healthcare provider. And rest after your surgery for as long as your healthcare provider tells you to.    Coping with pain  If you have pain after surgery, pain medicine will help you feel better. Take it as told, before pain becomes severe. Also, ask your healthcare provider or pharmacist about other ways to control pain. This might be with heat, ice, or relaxation. And follow any other instructions your surgeon or nurse gives you.    Tips for taking pain medicine  To get the best relief possible, remember these points:    · Pain medicines can upset your stomach. Taking them with a little food may help.  · Most pain relievers taken by mouth need at least 20 to 30 minutes to start to work.  · Taking medicine on a schedule can help you remember to take it. Try to time your medicine so that you can take it before starting an activity. This might be before you get dressed, go for a walk, or sit down for dinner.  · Constipation is a common side effect of pain medicines. Call your healthcare provider before taking any medicines such as laxatives or stool softeners to help  ease constipation. Also ask if you should skip any foods. Drinking lots of fluids and eating foods such as fruits and vegetables that are high in fiber can also help. Remember, do not take laxatives unless your surgeon has prescribed them.  · Drinking alcohol and taking pain medicine can cause dizziness and slow your breathing. It can even be deadly. Do not drink alcohol while taking pain medicine.  · Pain medicine can make you react more slowly to things. Do not drive or run machinery while taking pain medicine.    Your healthcare provider may tell you to take acetaminophen to help ease your pain. Ask him or her how much you are supposed to take each day. Acetaminophen or other pain relievers may interact with your prescription medicines or other over-the-counter (OTC) medicines. Some prescription medicines have acetaminophen and other ingredients. Using both prescription and OTC acetaminophen for pain can cause you to overdose. Read the labels on your OTC medicines with care. This will help you to clearly know the list of ingredients, how much to take, and any warnings. It may also help you not take too much acetaminophen. If you have questions or do not understand the information, ask your pharmacist or healthcare provider to explain it to you before you take the OTC medicine.    Managing nausea  Some people have an upset stomach after surgery. This is often because of anesthesia, pain, or pain medicine, or the stress of surgery. These tips will help you handle nausea and eat healthy foods as you get better. If you were on a special food plan before surgery, ask your healthcare provider if you should follow it while you get better. These tips may help:    · Do not push yourself to eat. Your body will tell you when to eat and how much.  · Start off with clear liquids and soup. They are easier to digest.  · Next try semi-solid foods, such as mashed potatoes, applesauce, and gelatin, as you feel ready.  · Slowly  move to solid foods. Dont eat fatty, rich, or spicy foods at first.  · Do not force yourself to have 3 large meals a day. Instead eat smaller amounts more often.  · Take pain medicines with a small amount of solid food, such as crackers or toast, to avoid nausea.     Call your surgeon if  · You still have pain an hour after taking medicine. The medicine may not be strong enough.  · You feel too sleepy, dizzy, or groggy. The medicine may be too strong.  · You have side effects like nausea, vomiting, or skin changes, such as rash, itching, or hives.       If you have obstructive sleep apnea  You were given anesthesia medicine during surgery to keep you comfortable and free of pain. After surgery, you may have more apnea spells because of this medicine and other medicines you were given. The spells may last longer than usual.   At home:    · Keep using the continuous positive airway pressure (CPAP) device when you sleep. Unless your healthcare provider tells you not to, use it when you sleep, day or night. CPAP is a common device used to treat obstructive sleep apnea.  · Talk with your provider before taking any pain medicine, muscle relaxants, or sedatives. Your provider will tell you about the possible dangers of taking these medicines.    © 2348-4818 [employer]. [address] [place] [address], [place] [address]. All rights reserved. This information is not intended as a substitute for professional medical care. Always follow your healthcare professional's instructions.    PLEASE FOLLOW ANY OTHER INSTRUCTIONS PROVIDED TO YOU BY DR. HAYES!    Select on Hyperlink below to print updated instructions from [employer]  [facility]             [address], [place]                                                                                          [phone]                   Postoperative Instructions for Knee Surgery                 Your Surgery Included:   Open                Arthroscopic   [] Ligament Repair       [] Diagnostic           [] ACL     [] PCL     [] MCL     [] PLLC      [x] Synovectomy / Plica Removal [] Meniscal Cartilage Repair / Transplantation      [] Lysis of Adhesions / Manipulation [] Articular Cartilage Repair      [] Interval Release           [] Microfracture       [] OATS   [] ACI      [x] Meniscectomy           [] Osteochondral Allograft      [] Meniscal Cartilage Repair  [] Patellar Realignment       [] Debridement / Chondroplasty         [] Lateral Release   [] Ligament Repair      [] Articular Cartilage Repair          [] Extensor Mechanism             []   Microfracture  []  OATS         []  Cartilage Biopsy [] Tendon Repair          [] Ligament Reconstruction          [] Patella                  [] Quadriceps             []   ACL    []   PCL  [] High Tibial Osteotomy       [] PRP Arthrocentesis  [] Joint Replacement         [] Amniox Arthrocentesis           [] Unicompartmental   [] Patellofemoral                    [] Total Knee                  Call our office ([phone]) immediately if you experience any of the following:       Excessive bleeding or pus like drainage at the incision site       Uncontrollable pain not relieved by pain medication       Excessive swelling or redness at the incision site       Fever above 101.5 degrees not controlled with Tylenol or Motrin       Shortness of Breath       Any foul odor or blistering from the surgery site    FOR EMERGENCIES: If any unusual problems or difficulties occur, call our office at [phone], or page the  at [phone] who will direct your call appropriately    1.   Pain Management: A cold therapy cuff, pain medications, local injections, and in some cases, regional anesthesia injections are used to manage your post-operative pain. The decision to use each of these options is based on their risks and benefits.     Medications: You were given one or more of the following  medication prescriptions before leaving the hospital. Have the prescriptions filled at a pharmacy on your way home and follow the instructions on the bottles. If you need a refill, please call your pharmacy.      Narcotic Medication (usually Vicodin ES, Lortab, Percocet or Nucynta): Begin taking the medication before your knee starts to hurt. Some patients do not like to take any medication, but if you wait until your pain is severe before taking, you will be very uncomfortable for several hours waiting for the narcotic to work. Always take with food.     Nausea / Vomiting: For this issue, we prescribe Phenergan, use this medication as directed.     Cold Therapy: You may have been sent home with a [employer] cold therapy unit and wrap for your knee. Fill with ice and water to the indicated fill line and use throughout the day for the first two days and then as needed to help relieve pain and control swelling.      Regional Anesthesia Injections (Blocks): You may have been given a regional nerve block either before or after surgery. This may make your entire leg numb for 24-36 hours.                            * Proceed with caution when bearing weight on your leg.     2.   Diet: Eat a bland diet for the first day after surgery. Progress your diet as tolerated. Constipation may occur with Narcotic usage, contact our office if you are experiencing constipation.    3.   Activity: Limit your activity during the first 48 hours, keep your leg elevated with pillows under your heel. After the first 48 hours at home, increase your activity level based on your symptoms.    4.   Dressing Change: Remove the dressing on the 3rd day. It is normal for some blood to be seen on the dressings. It is also normal for you to see apparent bruising on the skin around your knee when you remove the dressing. If present, leave the steri-strip tape across the incisions. If you are concerned by the drainage or the appearance of your knee,  "please call one of the numbers listed below.    5.   Showering: You may shower on the 3rd day after surgery with waterproof bandages over small incisions. If you have an incision with Prineo (clear mesh), it does not need to be covered. Do not submerge in any water until after your postoperative appointment in clinic.    6.   Your procedure did not require a post-operative brace.    7.   Your procedure did not require a Continuous Passive Motion (CPM) device.    8.   Weight Bearing: You may have been sent home with crutches. If instructed (see below), use these crutches at all times unless at complete rest.      [] Non-weight bearing for      weeks (you may touch your toes to the floor)      [] Partial weight bearing for   weeks    [] 25% Body Weight   [] 50% Body Weight      [x] Full weight bearing            [x]  NOW    []  after  weeks     9.  Knee Exercises: Begin these exercises the first day after surgery in order to help you regain your motion and strength. You may do the following marked exercises:     [x] Quad Sets - Begin activating your quadriceps muscle by driving your          knee downward into full knee extension while seated on a table or bed   with a towel rolled and propped under your heel     [x] Straight Leg Raise (SLR) - While natacha your quadriceps muscle, lift     your fully extended leg to the level of your non-operative knee (as shown)     [x] Heel Slides - With the knee straight, slide your heel slowly toward your   buttocks, hold at the endpoint for 10-15 seconds, then slowly straighten     [x] Ankle pumps - With your knee straight, move your ankle in a "pumping"    fashion to activate your calf and leg muscles      10.  Physical Therapy: Physical therapy is an essential component to your recovery from surgery. Your physical therapy will start in 5 days.    FIRST POSTOPERATIVE VISIT: As scheduled.       "

## 2017-12-08 NOTE — ANESTHESIA POSTPROCEDURE EVALUATION
"Anesthesia Post Evaluation    Patient: Haley Henriquez    Procedure(s) Performed: Procedure(s) (LRB):  MENISCECTOMY (medial OR lateral) (Right)  CHONDROPLASTY-KNEE (Right)  ARTHROSCOPY-KNEE (Right)  SYNOVECTOMY-KNEE (Right)    Final Anesthesia Type: general  Patient location during evaluation: PACU  Patient participation: Yes- Able to Participate  Level of consciousness: awake and alert  Post-procedure vital signs: reviewed and stable  Pain management: adequate  Airway patency: patent  PONV status at discharge: No PONV  Anesthetic complications: no      Cardiovascular status: blood pressure returned to baseline and stable  Respiratory status: unassisted, spontaneous ventilation and room air  Hydration status: euvolemic  Follow-up not needed.        Visit Vitals  BP (!) 102/55 (BP Location: Right arm, Patient Position: Lying)   Pulse 87   Temp 36.6 °C (97.9 °F) (Oral)   Resp 16   Ht 5' 6" (1.676 m)   Wt 56.7 kg (125 lb)   LMP 11/13/2017 (Approximate)   SpO2 100%   Breastfeeding? No   BMI 20.18 kg/m²       Pain/Sunita Score: Pain Assessment Performed: Yes (12/8/2017 10:15 AM)  Presence of Pain: complains of pain/discomfort (12/8/2017 10:15 AM)  Pain Rating Prior to Med Admin: 7 (12/8/2017  2:12 PM)      "

## 2017-12-11 RX ORDER — LEVONORGESTREL AND ETHINYL ESTRADIOL 0.15-0.03
1 KIT ORAL DAILY
Qty: 84 TABLET | Refills: 3 | OUTPATIENT
Start: 2017-12-11

## 2017-12-22 ENCOUNTER — OFFICE VISIT (OUTPATIENT)
Dept: SPORTS MEDICINE | Facility: CLINIC | Age: 27
End: 2017-12-22
Payer: COMMERCIAL

## 2017-12-22 VITALS
BODY MASS INDEX: 20.09 KG/M2 | SYSTOLIC BLOOD PRESSURE: 110 MMHG | WEIGHT: 125 LBS | DIASTOLIC BLOOD PRESSURE: 64 MMHG | HEART RATE: 78 BPM | HEIGHT: 66 IN

## 2017-12-22 DIAGNOSIS — Z98.890 S/P RIGHT KNEE ARTHROSCOPY: Primary | ICD-10-CM

## 2017-12-22 PROCEDURE — 99999 PR PBB SHADOW E&M-EST. PATIENT-LVL III: CPT | Mod: PBBFAC,,, | Performed by: PHYSICIAN ASSISTANT

## 2017-12-22 PROCEDURE — 99024 POSTOP FOLLOW-UP VISIT: CPT | Mod: S$GLB,,, | Performed by: PHYSICIAN ASSISTANT

## 2017-12-22 NOTE — PROGRESS NOTES
HISTORY OF PRESENT ILLNESS:   Pt is here today for first post-operative followup of her right knee arthroscopy.  she is doing well.  She is going to PT 3 times a week. No fever/chills/nausea/vomiting. She is off pain medication. We have reviewed her findings and discussed plan of care and future treatment options.                                                     DATE OF PROCEDURE: 12/8/2017     PREOPERATIVE DIAGNOSES:   1. Right knee medial meniscus tear.   2. Right knee medial synovial plica     POSTOPERATIVE DIAGNOSES:   1. Right knee medial meniscus tear.   2. Right knee medial synovial plica     PROCEDURES:   1. Right knee arthroscopic partial medial meniscectomy  2. Right knee arthroscopic medial plica excision     SURGEON: Tomy Johns M.D.                   PHYSICAL EXAMINATION:     Incision sites healed well  No evidence of any erythema, infection or induration  Range of motion 0-135 degrees  Minimal effusion  2+ DP pulse  No swelling, no calf tenderness  - Che's sign  Negative medial joint line tendernes  Moderate quad atrophy                                                                                 ASSESSMENT:                                                                                                                                               1. Status post above, doing well.                                                                                                                               PLAN:                                                                                                                                                     1. Continue with PT  2. Emphasized quad function.  3. I have discussed return to activity in detail.  4. she will see us back in 4 weeks.                                      5. All questions were answered and she should contact us if she has any questions or concerns in the interim.

## 2017-12-27 ENCOUNTER — TELEPHONE (OUTPATIENT)
Dept: OBSTETRICS AND GYNECOLOGY | Facility: CLINIC | Age: 27
End: 2017-12-27

## 2017-12-27 NOTE — TELEPHONE ENCOUNTER
Contacted the pt regarding her refill request for her BC. Informed the pt that Dr. Oliva wrote her Rx on 11/27 with three refills and that she shouldn't be out this early. Pt informed me that she did not ask for the refill request and that her insurance is sending the e-refill request. Pt inquired if there was a way that she can opt out of the automatic refill. Informed the pt that she has to contact her pharmacy or insurance company regarding that information. Pt verbalized understanding.

## 2018-01-16 ENCOUNTER — OFFICE VISIT (OUTPATIENT)
Dept: SPORTS MEDICINE | Facility: CLINIC | Age: 28
End: 2018-01-16
Payer: COMMERCIAL

## 2018-01-16 VITALS
DIASTOLIC BLOOD PRESSURE: 60 MMHG | HEIGHT: 66 IN | HEART RATE: 74 BPM | BODY MASS INDEX: 20.09 KG/M2 | WEIGHT: 125 LBS | SYSTOLIC BLOOD PRESSURE: 100 MMHG

## 2018-01-16 DIAGNOSIS — M25.561 RIGHT KNEE PAIN: Primary | ICD-10-CM

## 2018-01-16 DIAGNOSIS — Z98.890 POST-OPERATIVE STATE: ICD-10-CM

## 2018-01-16 DIAGNOSIS — Z98.890 S/P MEDIAL MENISCECTOMY OF RIGHT KNEE: ICD-10-CM

## 2018-01-16 PROCEDURE — 99024 POSTOP FOLLOW-UP VISIT: CPT | Mod: S$GLB,,, | Performed by: ORTHOPAEDIC SURGERY

## 2018-01-16 PROCEDURE — 99999 PR PBB SHADOW E&M-EST. PATIENT-LVL III: CPT | Mod: PBBFAC,,, | Performed by: ORTHOPAEDIC SURGERY

## 2018-01-16 NOTE — PROGRESS NOTES
"CC: Right knee scope post op 6 weeks    DATE OF PROCEDURE: 12/8/2017     PREOPERATIVE DIAGNOSES:   1. Right knee medial meniscus tear.   2. Right knee medial synovial plica     POSTOPERATIVE DIAGNOSES:   1. Right knee medial meniscus tear.   2. Right knee medial synovial plica     PROCEDURES:   1. Right knee arthroscopic partial medial meniscectomy  2. Right knee arthroscopic medial plica excision     SURGEON: Tomy Johns M.D.        PE:    /60   Pulse 74   Ht 5' 6" (1.676 m)   Wt 56.7 kg (125 lb)   LMP 01/09/2018   BMI 20.18 kg/m²      Right knee:    Incision clean/dry/intact  No sign of infection  Mild swelling  Compartments soft  Neurovascular status intact in extremity    FROM  Good quad strength  No effusion  Mild tenderness over medial or lateral joint lines    ROM: 0-145    Assessment:  6 weeks s/p right knee scope    Plan:  1. Transition to PT 1 x week for 4 weeks.  Then HEP  2. Follow up as needed    "

## 2018-02-21 ENCOUNTER — PATIENT MESSAGE (OUTPATIENT)
Dept: OBSTETRICS AND GYNECOLOGY | Facility: CLINIC | Age: 28
End: 2018-02-21

## 2018-02-26 DIAGNOSIS — Z80.3 FAMILY HISTORY OF BREAST CANCER: Primary | ICD-10-CM

## 2018-04-24 ENCOUNTER — TELEPHONE (OUTPATIENT)
Dept: OBSTETRICS AND GYNECOLOGY | Facility: CLINIC | Age: 28
End: 2018-04-24

## 2018-04-24 NOTE — TELEPHONE ENCOUNTER
Returned the pt's call to the clinic. Pt stated that she was informed that the mmg order was  and that she needs a new one. Informed the pt that the order doesn't  until . Pt agreed to an appointment on  at 7:45AM. Pt informed of the appointment instructions, location, date, and time. Pt verbalized understanding. Letter sent out.

## 2018-04-24 NOTE — TELEPHONE ENCOUNTER
----- Message from Melody Hawley sent at 4/24/2018  3:05 PM CDT -----  Contact: pt            Name of Who is Calling: LISA YANES [[id number]]      What is the request in detail: pt needs new mammo orders.. Please advise      Can the clinic reply by [facility]: no      [facility] [phone]

## 2018-05-09 ENCOUNTER — HOSPITAL ENCOUNTER (OUTPATIENT)
Dept: RADIOLOGY | Facility: OTHER | Age: 28
Discharge: HOME | End: 2018-05-09
Attending: OBSTETRICS & GYNECOLOGY
Payer: COMMERCIAL

## 2018-05-09 DIAGNOSIS — Z80.3 FAMILY HISTORY OF BREAST CANCER: ICD-10-CM

## 2018-05-09 PROCEDURE — 77067 SCR MAMMO BI INCL CAD: CPT | Mod: TC

## 2018-05-09 PROCEDURE — 77063 BREAST TOMOSYNTHESIS BI: CPT | Mod: 26,,, | Performed by: RADIOLOGY

## 2018-05-09 PROCEDURE — 77067 SCR MAMMO BI INCL CAD: CPT | Mod: 26,,, | Performed by: RADIOLOGY

## 2018-05-10 ENCOUNTER — TELEPHONE (OUTPATIENT)
Dept: OBSTETRICS AND GYNECOLOGY | Facility: CLINIC | Age: 28
End: 2018-05-10

## 2018-05-10 ENCOUNTER — PATIENT MESSAGE (OUTPATIENT)
Dept: OBSTETRICS AND GYNECOLOGY | Facility: HOSPITAL | Age: 28
End: 2018-05-10

## 2018-05-10 DIAGNOSIS — Z15.01 BRCA2 GENE MUTATION POSITIVE: Primary | ICD-10-CM

## 2018-05-10 DIAGNOSIS — Z15.09 BRCA2 GENE MUTATION POSITIVE: Primary | ICD-10-CM

## 2018-05-10 NOTE — TELEPHONE ENCOUNTER
Contacted the pt per Dr. Oilva to inform her to schedule a consult with [facility] to have notifications to schedule her bi-yearly MMG and MRI. Pt informed of [facility]'s number to get scheduled for her consult. Pt verbalized understanding.

## 2018-10-14 DIAGNOSIS — Z30.41 ENCOUNTER FOR SURVEILLANCE OF CONTRACEPTIVE PILLS: ICD-10-CM

## 2018-10-15 RX ORDER — LEVONORGESTREL AND ETHINYL ESTRADIOL 0.15-0.03
1 KIT ORAL DAILY
Qty: 84 TABLET | Refills: 2 | Status: SHIPPED | OUTPATIENT
Start: 2018-10-15 | End: 2018-12-12 | Stop reason: SDUPTHER

## 2018-10-15 NOTE — TELEPHONE ENCOUNTER
Hello, this is Maddie calling from the OBGYN clinic at [facility], [place], operated by [facility]. Calling to inform you that your request for a refill of your BC has been received and that it is pending approval from Dr. Oliva. Dr. Oliva is out of the office until Thursday, please allow her some time to review and respond to her messages. (No answer, left VM message for the pt to call the clinic back.)          Can you refill this patient's BC until her scheduled WWE on 12/12?

## 2018-10-15 NOTE — TELEPHONE ENCOUNTER
----- Message from Ruth Ann Doherty sent at 10/15/2018  2:39 PM CDT -----  Contact: sent  Pt is needing a refill on her birthcontrol MARLISSA  as soon as possible. The pt can be reached at [phone].

## 2018-12-10 ENCOUNTER — OFFICE VISIT (OUTPATIENT)
Dept: PSYCHIATRY | Facility: CLINIC | Age: 28
End: 2018-12-10
Payer: COMMERCIAL

## 2018-12-10 DIAGNOSIS — F41.0 PANIC ATTACKS: ICD-10-CM

## 2018-12-10 DIAGNOSIS — F32.A DEPRESSION, UNSPECIFIED DEPRESSION TYPE: ICD-10-CM

## 2018-12-10 DIAGNOSIS — F41.1 GAD (GENERALIZED ANXIETY DISORDER): Primary | ICD-10-CM

## 2018-12-10 PROCEDURE — 90791 PSYCH DIAGNOSTIC EVALUATION: CPT | Mod: S$GLB,,, | Performed by: SOCIAL WORKER

## 2018-12-10 PROCEDURE — 99999 PR PBB SHADOW E&M-EST. PATIENT-LVL II: CPT | Mod: PBBFAC,,, | Performed by: SOCIAL WORKER

## 2018-12-10 NOTE — PROGRESS NOTES
Psychiatry Initial Visit (PhD/LCSW)  Diagnostic Interview - CPT 60338    Date: 12/10/2018    Site: [facility]    Referral source: herself    Clinical status of patient: Outpatient    Haley Henriquez, a 28 y.o. female, for initial evaluation visit.  Met with patient.    Chief complaint/reason for encounter: depression, anxiety and behavior    History of present illness: history in her family and with herself of depression, panic attacks, and STEPHANIE. Otherwise she is healthy, no psychosis, no suicidal, and no drugs and or alcohol except wine at times and once a week marijuana, healthy, saw therapist before,  And MD for meds in the past,, wants therapy and MD at this time, lives with musician boyfriend age 37. She moved here from [place] over two years ago. And also is a CPA. Likes it here, and has made some friends.    Pain: 0    Symptoms:   · Mood: depressed mood, diminished interest, fatigue, worthlessness/guilt, poor concentration, tearfulness and social isolation  · Anxiety: decreased memory, excessive anxiety/worry, restlessness/keyed up, irritability, muscle tension and panic attacks  · Substance abuse: denied  · Cognitive functioning: denied  · Health behaviors: noncontributory    Psychiatric history: none    Medical history: none    Family history of psychiatric illness: anxiety and depression and panic run in her family    Social history (marriage, employment, etc.): in a relationship and no children, and lives with boyfriend    Substance use:   Alcohol: social   Drugs: a little marijuana   Tobacco: none   Caffeine: some    Current medications and drug reactions (include OTC, herbal): see medication list none    Strengths and liabilities: Strength: Patient accepts guidance/feedback, Strength: Patient is expressive/articulate., Strength: Patient is intelligent., Strength: Patient is motivated for change., Strength: Patient is physically healthy., Strength: Patient has positive support network.,  Strength: Patient has reasonable judgment., Strength: Patient is stable., Liability: Patient lacks coping skills.    Current Evaluation:     Mental Status Exam:  General Appearance:  unremarkable, age appropriate   Speech: normal tone, normal rate, normal pitch, normal volume      Level of Cooperation: cooperative      Thought Processes: normal and logical   Mood: anxious, depressed, sad      Thought Content: normal, no suicidality, no homicidality, delusions, or paranoia   Affect: congruent and appropriate   Orientation: Oriented x3   Memory: recent >  intact, remote >  intact   Attention Span & Concentration: intact   Fund of General Knowledge: intact and appropriate to age and level of education   Abstract Reasoning: interpretation of similarities was concrete   Judgment & Insight: fair     Language  intact     Diagnostic Impression - Plan:       ICD-10-CM ICD-9-CM   1. STEPHANIE (generalized anxiety disorder) F41.1 300.02   2. Panic attacks F41.0 300.01   3. Depression, unspecified depression type F32.9 311       Plan:individual psychotherapy, consult psychiatrist for medication evaluation and medication management by physician    Return to Clinic: 1 week    Length of Service (minutes): 30   She also stated in 2010 when in college was hospitalized for six days due to depression and suicidal ideation, and not having suicidal ideation now.

## 2018-12-12 ENCOUNTER — OFFICE VISIT (OUTPATIENT)
Dept: OBSTETRICS AND GYNECOLOGY | Facility: CLINIC | Age: 28
End: 2018-12-12
Attending: OBSTETRICS & GYNECOLOGY
Payer: COMMERCIAL

## 2018-12-12 VITALS
BODY MASS INDEX: 20.94 KG/M2 | SYSTOLIC BLOOD PRESSURE: 90 MMHG | WEIGHT: 130.31 LBS | HEIGHT: 66 IN | DIASTOLIC BLOOD PRESSURE: 58 MMHG

## 2018-12-12 DIAGNOSIS — Z15.01 BRCA2 POSITIVE: ICD-10-CM

## 2018-12-12 DIAGNOSIS — Z15.09 BRCA2 POSITIVE: ICD-10-CM

## 2018-12-12 DIAGNOSIS — Z91.89 AT HIGH RISK FOR BREAST CANCER: ICD-10-CM

## 2018-12-12 DIAGNOSIS — Z01.419 VISIT FOR GYNECOLOGIC EXAMINATION: Primary | ICD-10-CM

## 2018-12-12 DIAGNOSIS — Z30.41 ENCOUNTER FOR SURVEILLANCE OF CONTRACEPTIVE PILLS: ICD-10-CM

## 2018-12-12 PROCEDURE — 99999 PR PBB SHADOW E&M-EST. PATIENT-LVL III: CPT | Mod: PBBFAC,,, | Performed by: OBSTETRICS & GYNECOLOGY

## 2018-12-12 PROCEDURE — 99395 PREV VISIT EST AGE 18-39: CPT | Mod: S$GLB,,, | Performed by: OBSTETRICS & GYNECOLOGY

## 2018-12-12 RX ORDER — LEVONORGESTREL AND ETHINYL ESTRADIOL 0.15-0.03
1 KIT ORAL DAILY
Qty: 84 TABLET | Refills: 3 | Status: SHIPPED | OUTPATIENT
Start: 2018-12-12 | End: 2019-12-19 | Stop reason: SDUPTHER

## 2018-12-12 RX ORDER — CETIRIZINE HYDROCHLORIDE 10 MG/1
10 TABLET ORAL DAILY
COMMUNITY
End: 2021-03-25 | Stop reason: DRUGHIGH

## 2018-12-12 NOTE — PROGRESS NOTES
"CC: Well woman exam    Haley Henriquez is a 28 y.o. female  presents for a well woman exam.  She has no issues, problems, or complaints.    Past Medical History:   Diagnosis Date    Anxiety     Asthma     BRCA2 positive     Depression     Headache     Hx of psychiatric care     Panic disorder     Psychiatric problem     Therapy        Past Surgical History:   Procedure Laterality Date    ARTHROSCOPY-KNEE Right 2017    [facility]    CHONDROPLASTY-KNEE Right 2017    [facility]    COLPOSCOPY      MENISCECTOMY (medial OR lateral) Right 2017    [facility]    SYNOVECTOMY-KNEE Right 2017    [facility]       OB History    Para Term  AB Living   0 0 0 0 0 0   SAB TAB Ectopic Multiple Live Births   0 0 0 0               Family History   Problem Relation Age of Onset    Breast cancer Maternal Aunt     BRCA 1/2 Mother     Ovarian cancer Maternal Grandmother     Anxiety disorder Maternal Grandmother     Depression Maternal Grandmother     Depression Father     Anxiety disorder Father     Depression Brother     Anxiety disorder Brother     Anxiety disorder Maternal Grandfather     Depression Maternal Grandfather     Anxiety disorder Paternal Grandfather     Depression Paternal Grandfather     Anxiety disorder Paternal Grandmother     Depression Paternal Grandmother     Colon cancer Neg Hx        Social History     Tobacco Use    Smoking status: Never Smoker    Smokeless tobacco: Never Used   Substance Use Topics    Alcohol use: Yes     Comment: socially    Drug use: Yes     Frequency: 1.0 times per week     Types: Marijuana     Comment: 0nce per week or less       BP (!) 90/58 (BP Location: Left arm, Patient Position: Sitting, BP Method: Large (Manual))   Ht 5' 6" (1.676 m)   Wt 59.1 kg (130 lb 4.7 oz)   " LMP 12/07/2018 (Exact Date)   BMI 21.03 kg/m²     ROS:  GENERAL: Denies weight gain or weight loss. Feeling well overall.   SKIN: Denies rash or lesions.   HEAD: Denies head injury or headache.   NODES: Denies enlarged lymph nodes.   CHEST: Denies chest pain or shortness of breath.   CARDIOVASCULAR: Denies palpitations or left sided chest pain.   ABDOMEN: No abdominal pain, constipation, diarrhea, nausea, vomiting or rectal bleeding.   URINARY: No frequency, dysuria, hematuria, or burning on urination.  REPRODUCTIVE: See HPI.   BREASTS: The patient performs breast self-examination and denies pain, lumps, or nipple discharge.   HEMATOLOGIC: No easy bruisability or excessive bleeding.  MUSCULOSKELETAL: Denies joint pain or swelling.   NEUROLOGIC: Denies syncope or weakness.   PSYCHIATRIC: Denies depression, anxiety or mood swings.    Physical Exam:    APPEARANCE: Well nourished, well developed, in no acute distress.  AFFECT: WNL, alert and oriented x 3  SKIN: No acne or hirsutism  NECK: Neck symmetric without masses or thyromegaly  NODES: No inguinal, cervical, axillary, or femoral lymph node enlargement  CHEST: Good respiratory effect  ABDOMEN: Soft.  No tenderness or masses.  No hepatosplenomegaly.  No hernias.  BREASTS: Symmetrical, no skin changes or visible lesions.  No palpable masses, nipple discharge bilaterally.  PELVIC: Normal external genitalia without lesions.  Normal hair distribution.  Adequate perineal body, normal urethral meatus.  Vagina moist and well rugated without lesions or discharge.  Cervix pink, without lesions, discharge or tenderness.  No significant cystocele or rectocele.  Bimanual exam shows uterus to be normal size, regular, mobile and nontender.  Adnexa without masses or tenderness.    EXTREMITIES: No edema.    ASSESSMENT AND PLAN  1. Visit for gynecologic examination     2. BRCA2 positive  MRI Breast Bilateral W WO Contrast    Ambulatory Referral to Breast Surgery   3. At high risk  for breast cancer  MRI Breast Bilateral W WO Contrast       Patient was counseled today on A.C.S. Pap guidelines and recommendations for yearly pelvic exams, pap smears every 3 years (last pap 2016), and monthly self breast exams; to see her PCP for other health maintenance.     Discussed the surveillance recommendations for women with BRCA 2 for breast cancer:     For women aged 30 years and older with known BRCA mutations or other actionable breast cancer mutations, recommended breast cancer surveillance includes annual mammography and annual breast MRI with contrast, often alternating every 6 months (17).      And the use of OCP's to reduce the risk of ovarian cancer.       Follow-up in about 1 year (around 12/12/2019).

## 2018-12-19 ENCOUNTER — OFFICE VISIT (OUTPATIENT)
Dept: PSYCHIATRY | Facility: CLINIC | Age: 28
End: 2018-12-19
Payer: COMMERCIAL

## 2018-12-19 DIAGNOSIS — F41.1 GAD (GENERALIZED ANXIETY DISORDER): Primary | ICD-10-CM

## 2018-12-19 DIAGNOSIS — F32.A DEPRESSION, UNSPECIFIED DEPRESSION TYPE: ICD-10-CM

## 2018-12-19 DIAGNOSIS — F41.0 PANIC ATTACKS: ICD-10-CM

## 2018-12-19 PROCEDURE — 99999 PR PBB SHADOW E&M-EST. PATIENT-LVL I: CPT | Mod: PBBFAC,,, | Performed by: SOCIAL WORKER

## 2018-12-19 PROCEDURE — 90832 PSYTX W PT 30 MINUTES: CPT | Mod: S$GLB,,, | Performed by: SOCIAL WORKER

## 2018-12-19 NOTE — PROGRESS NOTES
Individual Psychotherapy (PhD/LCSW)    12/19/2018    Site:  [facility]         Therapeutic Intervention: Met with patient.  Outpatient - Insight oriented psychotherapy 30 min - CPT code 77168    Chief complaint/reason for encounter: depression, anxiety, behavior, cognition and interpersonal     Interval history and content of current session: discussed coping skills, hair pulling, paying attention to being positive and make a list of her good qualities and also her strengths and her life and think about it several times a day, stop hair pulling, going on a trip with boyfriend, and communications, and letting it be okay to ask for help also addressed, sees MD in one month here, takes time and practice to change thinking, and behavior, and improve self-esteem; pay attention to self-talk and make this more positive, do more at next session on self-talk and improve her ability to be more positive in her thinking, and let it be okay to be human and make mistakes and ask for information when needed. Be in the present also focused on.    Treatment plan:  · Target symptoms: depression, anxiety , adjustment, work stress  · Why chosen therapy is appropriate versus another modality: relevant to diagnosis, patient responds to this modality, evidence based practice  · Outcome monitoring methods: self-report, observation  · Therapeutic intervention type: insight oriented psychotherapy, behavior modifying psychotherapy, supportive psychotherapy    Risk parameters:  Patient reports no suicidal ideation  Patient reports no homicidal ideation  Patient reports no self-injurious behavior  Patient reports no violent behavior    Verbal deficits: None    Patient's response to intervention:  The patient's response to intervention is accepting.    Progress toward goals and other mental status changes:  The patient's progress toward goals is limited.    Diagnosis:     ICD-10-CM ICD-9-CM   1. STEPHANIE (generalized anxiety disorder) F41.1  300.02   2. Panic attacks F41.0 300.01   3. Depression, unspecified depression type F32.9 311       Plan:  individual psychotherapy and consult psychiatrist for medication evaluation    Return to clinic: 2 weeks    Length of Service (minutes): 30

## 2019-01-04 ENCOUNTER — TELEPHONE (OUTPATIENT)
Dept: OBSTETRICS AND GYNECOLOGY | Facility: CLINIC | Age: 29
End: 2019-01-04

## 2019-01-04 NOTE — TELEPHONE ENCOUNTER
Contacted the patient to inform her that the peer to peer review has been approved. Patient verbalized understanding.

## 2019-01-15 ENCOUNTER — OFFICE VISIT (OUTPATIENT)
Dept: PSYCHIATRY | Facility: CLINIC | Age: 29
End: 2019-01-15
Payer: COMMERCIAL

## 2019-01-15 VITALS
HEIGHT: 66 IN | HEART RATE: 68 BPM | SYSTOLIC BLOOD PRESSURE: 113 MMHG | DIASTOLIC BLOOD PRESSURE: 71 MMHG | WEIGHT: 129.94 LBS | BODY MASS INDEX: 20.88 KG/M2

## 2019-01-15 DIAGNOSIS — F41.1 GAD (GENERALIZED ANXIETY DISORDER): Primary | ICD-10-CM

## 2019-01-15 DIAGNOSIS — F41.1 GENERALIZED ANXIETY DISORDER: ICD-10-CM

## 2019-01-15 DIAGNOSIS — F33.1 MODERATE EPISODE OF RECURRENT MAJOR DEPRESSIVE DISORDER: Primary | ICD-10-CM

## 2019-01-15 PROCEDURE — 99202 PR OFFICE/OUTPT VISIT, NEW, LEVL II, 15-29 MIN: ICD-10-PCS | Mod: S$GLB,,, | Performed by: PSYCHIATRY & NEUROLOGY

## 2019-01-15 PROCEDURE — 99999 PR PBB SHADOW E&M-EST. PATIENT-LVL II: ICD-10-PCS | Mod: PBBFAC,,, | Performed by: PSYCHIATRY & NEUROLOGY

## 2019-01-15 PROCEDURE — 90832 PR PSYCHOTHERAPY W/PATIENT, 30 MIN: ICD-10-PCS | Mod: S$GLB,,, | Performed by: SOCIAL WORKER

## 2019-01-15 PROCEDURE — 99999 PR PBB SHADOW E&M-EST. PATIENT-LVL I: ICD-10-PCS | Mod: PBBFAC,,, | Performed by: SOCIAL WORKER

## 2019-01-15 PROCEDURE — 99999 PR PBB SHADOW E&M-EST. PATIENT-LVL I: CPT | Mod: PBBFAC,,, | Performed by: SOCIAL WORKER

## 2019-01-15 PROCEDURE — 99202 OFFICE O/P NEW SF 15 MIN: CPT | Mod: S$GLB,,, | Performed by: PSYCHIATRY & NEUROLOGY

## 2019-01-15 PROCEDURE — 99999 PR PBB SHADOW E&M-EST. PATIENT-LVL II: CPT | Mod: PBBFAC,,, | Performed by: PSYCHIATRY & NEUROLOGY

## 2019-01-15 PROCEDURE — 90832 PSYTX W PT 30 MINUTES: CPT | Mod: S$GLB,,, | Performed by: SOCIAL WORKER

## 2019-01-15 RX ORDER — FLUOXETINE HYDROCHLORIDE 20 MG/1
20 CAPSULE ORAL DAILY
Qty: 30 CAPSULE | Refills: 1 | Status: SHIPPED | OUTPATIENT
Start: 2019-01-15 | End: 2019-02-12 | Stop reason: SDUPTHER

## 2019-01-15 RX ORDER — FOLIC ACID 1 MG/1
2 TABLET ORAL DAILY
Qty: 60 TABLET | Refills: 1 | Status: SHIPPED | OUTPATIENT
Start: 2019-01-15 | End: 2019-02-12 | Stop reason: SDUPTHER

## 2019-01-15 NOTE — PROGRESS NOTES
Individual Psychotherapy (PhD/LCSW)    1/15/2019    Site:  [facility]         Therapeutic Intervention: Met with patient.  Outpatient - Insight oriented psychotherapy 30 min - CPT code 85305    Chief complaint/reason for encounter: depression, anxiety, sleep and behavior     Interval history and content of current session: discussed that she sees the MD today at 10:30 am and needs to discussed anxiety, worry, panic disorder, pulling out her hair, sleep concerns, and over all worry, fears, and discomfort, depression is a little better, is attempting to use skills we are working on for de-stressing and relaxation, and working on lowering her expectations of life and self and being more positive, today we worked on being more positive about herself and her life and also not so hard on herself and being nurturing and having gratitude for herself, suggested she get an néstor for her phone that gives her positive messages everyday and is doing some reading on coping skills and relaxation and being more gently with herself.    Treatment plan:  · Target symptoms: depression, anxiety , adjustment, work stress  · Why chosen therapy is appropriate versus another modality: relevant to diagnosis, patient responds to this modality, evidence based practice  · Outcome monitoring methods: self-report, observation  · Therapeutic intervention type: insight oriented psychotherapy, behavior modifying psychotherapy, supportive psychotherapy    Risk parameters:  Patient reports no suicidal ideation  Patient reports no homicidal ideation  Patient reports no self-injurious behavior  Patient reports no violent behavior    Verbal deficits: None    Patient's response to intervention:  The patient's response to intervention is accepting, motivated.    Progress toward goals and other mental status changes:  The patient's progress toward goals is fair .    Diagnosis:     ICD-10-CM ICD-9-CM   1. STEPHANIE (generalized anxiety disorder) F41.1 300.02        Plan:  individual psychotherapy and consult psychiatrist for medication evaluation    Return to clinic: 2 weeks    Length of Service (minutes): 30   Also has panic attacks, we have suggested breathing and mindfulness fiona in the moment as a way to relax also, she also does yoga, and exercise at times, and walks her dog, so she is very motivated for changes, and making a list of the positive things about herself and her life also suggested.

## 2019-01-15 NOTE — PROGRESS NOTES
"Outpatient Psychiatry Initial Visit (MD/NP)    1/15/2019    Haley Henriquez, a 28 y.o. female, presenting for initial evaluation visit. Met with patient.    Reason for Encounter: self-referral. Patient complains of depression, anxiety and skin picking on head.    History of Present Illness:    Patient started noticing in college that she had anxiety and depression. During sophomore year of college. She started seeing a therapist. She never really found a therapist that she loved so she was sporadic. She was put on a couple different meds. Fluoxetine seemed to work well. She broke up with a boyfriend during senior year of college. She was hospitalized for 7-10 days for SI. Went back and finished college that summer. She was pretty heavily medicated. Stayed a fifth year for masters in accounting. In [place], she stayed on antidepressant, Prozac, for a couple years. PCP was prescribing the medicine. Always had anxiety. Panic attacks worsened. 6 months after moving, she stopped taking her medicine.    Over the past year, she feels like she needs medication. Her anxiety has progressively worsened - pressure from family, pressure on herself, work, the future, relationship. Anxiety impedes sleep and focus. Started seeing Dr. Peng since November. Denies any obsessions but endorses picking at her skin on her head and pulling her hair out. Panic attacks couple times per month; 30 minutes to a couple hours, uncontrollable freaking out, can't function, racing hearts, can't breathe, can't think. Mood has been "calmer" for the past two weeks because she has tried to do less. Generally, she feels "neurotic and anxious." She has been trying to do yoga, not drinking alcohol. Eating well. Sleeping 8 hours with melatonin and CBD oil. Energy level is ok. Concentration is difficult. Endorses some psychomotor retardation.    Endorses passive SI. The patient denies any current active SI or any plan/intent to self-harm /HI or any " "plan/intent to self-harm or harm others. Denies AH/VH, paranoia, thought insertion/blocking, special messaging from the television/radio, or direct messaging from God. No vocalized delusions. Patient has no prior suicide attempts, history of violence or access to a gun. Patient denies any prior psychiatric hospitalizations, prior psychiatric medications or outpatient psychiatrist. Patient endorses "good" mood and denies anhedonia, feelings of guilt/hopelessness, psychomotor agitation/retardation or changes in sleep, energy, concentration or appetite. Patient denies any sober period of sleep deficit associated with grandiosity/irritability, distractibility, impulsivity, racing thoughts, increased activity and talkativeness.    Picks at her head and pulling hair out.    Without CBD oil or melatonin, she has frequent nighttime wakenings.    Psychiatric History:  Prior psych meds: Vyvanse for ADHD for 1.5 years ago, on Ambien for a couple years, Lexapro, Celexa, Cymbalta, Klonopin  Prior psych hospitalizations: 1x-senior year of college for SI  Prior outpatient psychiatrist: not for a long time  Previous suicide attempts: denies  Hx of violence: denies  Access to a gun: denies     Social:  Origin: [place], [place]; 2.5 years ago, moved here for boyfriend  Marital Status: never   Children: no kids  Other Support: boyfriend  Housing Status: with boyfriend, Briana  Employment Info:  for [employer]  Education: has her CPA  Special Ed: denies; needed help with reading comprehension     Family:  Family Psych Hx: both grandmothers-anxiety and depression, suicide attemp; both parents-anxiety and depression but not actively seeking help  Hx of phys/sexual abuse: denies     Legal:  Past charges/incarcerations: denies  Pending charges: denies     Substance Use:  Rec Drugs: marijuana-2x per month; cocaine-1 month ago  Use of EtOH: 2 glasses of wine 3-4 nights per week  Tobacco: denies  Rehab Hx: " "denies     PMHx  Slight asthma  BRCA2 gene  Seizures: denies  Head trauma/TBI: denies  Allergies: NKDA  Current meds:  -OCPs  -OTC allergy meds    Review Of Systems:     ROS  General ROS: negative for - chills or fever  Respiratory ROS: no cough, shortness of breath, or wheezing  Cardiovascular ROS: no chest pain or dyspnea on exertion  Gastrointestinal ROS: no abdominal pain, change in bowel habits, or black or bloody stools  Neurological ROS: negative for - headaches or weakness    Current Evaluation:     Nutritional Screening: Considering the patient's height and weight, medications, medical history and preferences, should a referral be made to the dietitian? no    Constitutional  Vitals:  Most recent vital signs, dated less than 90 days prior to this appointment, were reviewed.    Vitals:    01/15/19 1039   BP: 113/71   Pulse: 68   Weight: 59 kg (129 lb 15.4 oz)   Height: 5' 6" (1.676 m)        General:  unremarkable, age appropriate     Musculoskeletal  Muscle Strength/Tone:  not examined   Gait & Station:  non-ataxic     Psychiatric  Speech:  no latency; no press   Mood & Affect:  anxious  congruent and appropriate   Thought Process:  normal and logical   Associations:  intact   Thought Content:  normal, no suicidality, no homicidality, delusions, or paranoia   Insight:  intact   Judgement: behavior is adequate to circumstances   Orientation:  grossly intact   Memory: intact for content of interview   Language: grossly intact   Attention Span & Concentration:  able to focus   Fund of Knowledge:  intact and appropriate to age and level of education       Relevant Elements of Neurological Exam: normal gait    Laboratory Data  No visits with results within 1 Month(s) from this visit.   Latest known visit with results is:   Admission on 12/08/2017, Discharged on 12/08/2017   Component Date Value Ref Range Status    POC Preg Test, Ur 12/08/2017 Negative  Negative Final     Acceptable 12/08/2017 Yes "   Final         Medications  Outpatient Encounter Medications as of 1/15/2019   Medication Sig Dispense Refill    albuterol 90 mcg/actuation inhaler Inhale 2 puffs into the lungs every 6 (six) hours as needed for Wheezing. Rescue      cetirizine (ZYRTEC) 10 MG tablet Take 10 mg by mouth once daily.      fluticasone-salmeterol 250-50 mcg/dose (ADVAIR) 250-50 mcg/dose diskus inhaler Inhale 1 puff into the lungs 2 (two) times daily. Controller      MARLISSA 0.15-0.03 mg per tablet Take 1 tablet by mouth once daily. 84 tablet 3     No facility-administered encounter medications on file as of 1/15/2019.            Assessment - Diagnosis - Goals:     Impression:  Major depressive disorder  Generalized anxiety disorder  R/O OCD    Treatment Plan/Recommendations:   -start Prozac 20mg daily    Discussed diagnosis, risks and benefits of proposed treatment vs alternative treatments vs no treatment, potential side effects of these treatments specifically for Prozac including but not limited to decreased appetite, dry mouth, sweating, nausea, constipation, sexual side effects in men or women, yawn, tremor, sleepiness, increased suicidal ideation and serotonin syndrome.    Return to Clinic: 1 month    Allyson Darga, MD [facility] Psychiatry, PGY-3

## 2019-02-05 ENCOUNTER — OFFICE VISIT (OUTPATIENT)
Dept: INTERNAL MEDICINE | Facility: CLINIC | Age: 29
End: 2019-02-05
Attending: FAMILY MEDICINE
Payer: COMMERCIAL

## 2019-02-05 VITALS
HEART RATE: 64 BPM | OXYGEN SATURATION: 97 % | HEIGHT: 66 IN | SYSTOLIC BLOOD PRESSURE: 118 MMHG | BODY MASS INDEX: 20.23 KG/M2 | WEIGHT: 125.88 LBS | DIASTOLIC BLOOD PRESSURE: 60 MMHG

## 2019-02-05 DIAGNOSIS — J32.9 SINUSITIS, UNSPECIFIED CHRONICITY, UNSPECIFIED LOCATION: ICD-10-CM

## 2019-02-05 DIAGNOSIS — J45.909 ASTHMA, UNSPECIFIED ASTHMA SEVERITY, UNSPECIFIED WHETHER COMPLICATED, UNSPECIFIED WHETHER PERSISTENT: Primary | ICD-10-CM

## 2019-02-05 PROCEDURE — 99204 PR OFFICE/OUTPT VISIT, NEW, LEVL IV, 45-59 MIN: ICD-10-PCS | Mod: S$GLB,,, | Performed by: FAMILY MEDICINE

## 2019-02-05 PROCEDURE — 99999 PR PBB SHADOW E&M-EST. PATIENT-LVL III: CPT | Mod: PBBFAC,,, | Performed by: FAMILY MEDICINE

## 2019-02-05 PROCEDURE — 99204 OFFICE O/P NEW MOD 45 MIN: CPT | Mod: S$GLB,,, | Performed by: FAMILY MEDICINE

## 2019-02-05 PROCEDURE — 99999 PR PBB SHADOW E&M-EST. PATIENT-LVL III: ICD-10-PCS | Mod: PBBFAC,,, | Performed by: FAMILY MEDICINE

## 2019-02-05 RX ORDER — PREDNISONE 20 MG/1
20 TABLET ORAL DAILY
Qty: 10 TABLET | Refills: 0 | Status: SHIPPED | OUTPATIENT
Start: 2019-02-05 | End: 2019-02-15

## 2019-02-05 RX ORDER — AMOXICILLIN AND CLAVULANATE POTASSIUM 875; 125 MG/1; MG/1
1 TABLET, FILM COATED ORAL EVERY 12 HOURS
Qty: 20 TABLET | Refills: 0 | Status: SHIPPED | OUTPATIENT
Start: 2019-02-05 | End: 2019-12-19

## 2019-02-05 RX ORDER — BUDESONIDE AND FORMOTEROL FUMARATE DIHYDRATE 80; 4.5 UG/1; UG/1
2 AEROSOL RESPIRATORY (INHALATION) 2 TIMES DAILY
Qty: 10.2 G | Refills: 2 | Status: SHIPPED | OUTPATIENT
Start: 2019-02-05 | End: 2019-02-05 | Stop reason: SDUPTHER

## 2019-02-05 RX ORDER — PREDNISONE 20 MG/1
20 TABLET ORAL DAILY
Qty: 10 TABLET | Refills: 0 | Status: SHIPPED | OUTPATIENT
Start: 2019-02-05 | End: 2019-02-05 | Stop reason: SDUPTHER

## 2019-02-05 RX ORDER — BUDESONIDE AND FORMOTEROL FUMARATE DIHYDRATE 80; 4.5 UG/1; UG/1
2 AEROSOL RESPIRATORY (INHALATION) 2 TIMES DAILY
Qty: 10.2 G | Refills: 2 | Status: SHIPPED | OUTPATIENT
Start: 2019-02-05 | End: 2019-12-16

## 2019-02-05 RX ORDER — AMOXICILLIN AND CLAVULANATE POTASSIUM 875; 125 MG/1; MG/1
1 TABLET, FILM COATED ORAL EVERY 12 HOURS
Qty: 20 TABLET | Refills: 0 | Status: SHIPPED | OUTPATIENT
Start: 2019-02-05 | End: 2019-02-05 | Stop reason: SDUPTHER

## 2019-02-05 NOTE — PROGRESS NOTES
"Subjective:      Patient ID: Haley Henriquez is a 28 y.o. female.    Chief Complaint: URI    HPI   This is a new patient to me. History of asthma. Patient here today for cold. Symptoms started one week ago. She has had sinus pressure, runny nose, ear pressure that has resolved, cough with wheezing. She has been using albuterol inhaler and this has helped. Cough is worse in morning and at night and can be dry and productive. She does take zyrtec daily and this has not helped. She denies sore throat, fevers, nausea, vomiting. She denies any travel or camping prior to onset, no sick contacts, no new pets. She does not take advair daily. Headaches did start 4 days ago and have since resolved. It was an pressure/ache pain across the top and she did take advil which did help a little bit.       Review of Systems   Constitutional: Negative.    HENT: Positive for congestion, postnasal drip, rhinorrhea and sinus pressure. Negative for ear discharge and ear pain.    Eyes: Negative.    Respiratory: Positive for cough and wheezing.    Cardiovascular: Negative.    Gastrointestinal: Negative.    Genitourinary: Negative.    Neurological: Positive for headaches.     I personally reviewed Past Medical History, Past Surgical history,  Past Social History and Family History      Objective:   /60 (BP Location: Left arm, Patient Position: Sitting, BP Method: Small (Manual))   Pulse 64   Ht 5' 6" (1.676 m)   Wt 57.1 kg (125 lb 14.1 oz)   LMP 01/31/2019 (Exact Date)   SpO2 97%   BMI 20.32 kg/m²     Physical Exam   Constitutional: She is oriented to person, place, and time. She appears well-developed and well-nourished. No distress.   HENT:   Head: Normocephalic and atraumatic.   Right Ear: Hearing, tympanic membrane, external ear and ear canal normal.   Left Ear: Hearing, tympanic membrane, external ear and ear canal normal.   Nose: Mucosal edema present. Right sinus exhibits maxillary sinus tenderness. Right sinus " exhibits no frontal sinus tenderness. Left sinus exhibits maxillary sinus tenderness. Left sinus exhibits no frontal sinus tenderness.   Mouth/Throat: Uvula is midline and oropharynx is clear and moist. No oropharyngeal exudate.   Eyes: Conjunctivae and EOM are normal. Pupils are equal, round, and reactive to light. Right eye exhibits no discharge. Left eye exhibits no discharge. No scleral icterus.   Neck: Normal range of motion. Neck supple.   Cardiovascular: Normal rate, regular rhythm, normal heart sounds and intact distal pulses. Exam reveals no gallop.   No murmur heard.  Pulmonary/Chest: Effort normal and breath sounds normal. No respiratory distress. She has no wheezes. She has no rales. She exhibits no tenderness.   Neurological: She is alert and oriented to person, place, and time. No cranial nerve deficit.   Skin: Skin is warm and dry.   Vitals reviewed.      1. Asthma, unspecified asthma severity, unspecified whether complicated, unspecified whether persistent    2. Sinusitis, unspecified chronicity, unspecified location        1-2. Start symbicort, albuterol prn, start prednisone and augmentin, call with any worsening or no improvement of symptoms     No orders of the defined types were placed in this encounter.    Medications Ordered This Encounter   Medications    amoxicillin-clavulanate 875-125mg (AUGMENTIN) 875-125 mg per tablet     Sig: Take 1 tablet by mouth every 12 (twelve) hours.     Dispense:  20 tablet     Refill:  0    budesonide-formoterol 80-4.5 mcg (SYMBICORT) 80-4.5 mcg/actuation HFAA     Sig: Inhale 2 puffs into the lungs 2 (two) times daily. Controller     Dispense:  10.2 g     Refill:  2    predniSONE (DELTASONE) 20 MG tablet     Sig: Take 1 tablet (20 mg total) by mouth once daily. for 10 days     Dispense:  10 tablet     Refill:  0

## 2019-02-05 NOTE — PATIENT INSTRUCTIONS
Self-Care for Sinusitis     Drinking plenty of water can help sinuses drain.   Sinusitis can often be managed with self-care. Self-care can keep sinuses moist and make you feel more comfortable. Remember to follow your doctor's instructions closely. This can make a big difference in getting your sinus problem under control.  Drink fluids  Drinking extra fluids helps thin your mucus. This lets it drain from your sinuses more easily. Have a glass of water every hour or two. A humidifier helps in much the same way. Fluids can also offset the drying effects of certain medicines. If you use a humidifier, follow the product maker's instructions on how to use it. Clean it on a regular schedule.  Use saltwater rinses  Rinses help keep your sinuses and nose moist. Mix a teaspoon of salt in 8 ounces of fresh, warm water. Use a bulb syringe to gently squirt the water into your nose a few times a day. You can also buy ready-made saline nasal sprays.  Apply hot or cold packs  Applying heat to the area surrounding your sinuses may make you feel more comfortable. Use a hot water bottle or a hand towel dipped in hot water. Some people also find ice packs effective for relieving pain.  Medicines  Your doctor may prescribe medications to help treat your sinusitis. If you have an infection, antibiotics can help clear it up. If you are prescribed antibiotics, take all pills on schedule until they are gone, even if you feel better. Decongestants help relieve swelling. Use decongestant sprays for short periods only under the direction of your doctor. If you have allergies, your doctor may prescribe medications to help relieve them.   Date Last Reviewed: 10/1/2016  © 9607-2810 [employer] [address] [place] [id number]. All rights reserved. This information is not intended as a substitute for professional medical care. Always follow your healthcare professional's instructions.

## 2019-02-06 ENCOUNTER — OFFICE VISIT (OUTPATIENT)
Dept: PSYCHIATRY | Facility: CLINIC | Age: 29
End: 2019-02-06
Payer: COMMERCIAL

## 2019-02-06 DIAGNOSIS — F41.1 GAD (GENERALIZED ANXIETY DISORDER): Primary | ICD-10-CM

## 2019-02-06 PROCEDURE — 90834 PSYTX W PT 45 MINUTES: CPT | Mod: S$GLB,,, | Performed by: SOCIAL WORKER

## 2019-02-06 PROCEDURE — 99999 PR PBB SHADOW E&M-EST. PATIENT-LVL I: CPT | Mod: PBBFAC,,, | Performed by: SOCIAL WORKER

## 2019-02-06 PROCEDURE — 90834 PR PSYCHOTHERAPY W/PATIENT, 45 MIN: ICD-10-PCS | Mod: S$GLB,,, | Performed by: SOCIAL WORKER

## 2019-02-06 PROCEDURE — 99999 PR PBB SHADOW E&M-EST. PATIENT-LVL I: ICD-10-PCS | Mod: PBBFAC,,, | Performed by: SOCIAL WORKER

## 2019-02-07 NOTE — PROGRESS NOTES
Individual Psychotherapy (PhD/LCSW)    2/6/2019    Site:  [facility]         Therapeutic Intervention: Met with patient.  Outpatient - Insight oriented psychotherapy 45 min - CPT code 70765    Chief complaint/reason for encounter: depression and anxiety     Interval history and content of current session: discussed some improvement, went over strengths, went over positive thinking, gave ideas on healthy relationships, and gave ideas on how to stop pulling her hair out, and how to be in the moment, and changing her thinking and lifestyle to be more positive addressed, and taking better care of herself, may likely be engaged soon which she is very happy about, saw the MD and that was helpful.    Treatment plan:  · Target symptoms: depression, anxiety , adjustment, work stress  · Why chosen therapy is appropriate versus another modality: relevant to diagnosis, patient responds to this modality, evidence based practice  · Outcome monitoring methods: self-report, observation  · Therapeutic intervention type: insight oriented psychotherapy, behavior modifying psychotherapy, supportive psychotherapy    Risk parameters:  Patient reports no suicidal ideation  Patient reports no homicidal ideation  Patient reports no self-injurious behavior  Patient reports no violent behavior    Verbal deficits: None    Patient's response to intervention:  The patient's response to intervention is accepting, motivated.    Progress toward goals and other mental status changes:  The patient's progress toward goals is fair .    Diagnosis:     ICD-10-CM ICD-9-CM   1. STEPHANIE (generalized anxiety disorder) F41.1 300.02       Plan:  individual psychotherapy and consult psychiatrist for medication evaluation    Return to clinic: 2 weeks    Length of Service (minutes): 45  Discussed making decisions and not stressing herself out, how to be calm and how to relax and keep the progress going that she is doing.

## 2019-02-12 ENCOUNTER — OFFICE VISIT (OUTPATIENT)
Dept: PSYCHIATRY | Facility: CLINIC | Age: 29
End: 2019-02-12
Payer: COMMERCIAL

## 2019-02-12 VITALS
HEIGHT: 66 IN | HEART RATE: 60 BPM | DIASTOLIC BLOOD PRESSURE: 75 MMHG | WEIGHT: 128.06 LBS | BODY MASS INDEX: 20.58 KG/M2 | SYSTOLIC BLOOD PRESSURE: 123 MMHG

## 2019-02-12 DIAGNOSIS — F32.A DEPRESSION, UNSPECIFIED DEPRESSION TYPE: Primary | ICD-10-CM

## 2019-02-12 DIAGNOSIS — F41.1 GAD (GENERALIZED ANXIETY DISORDER): ICD-10-CM

## 2019-02-12 DIAGNOSIS — F41.0 PANIC ATTACKS: ICD-10-CM

## 2019-02-12 PROCEDURE — 99212 OFFICE O/P EST SF 10 MIN: CPT | Mod: S$GLB,,, | Performed by: PSYCHIATRY & NEUROLOGY

## 2019-02-12 PROCEDURE — 99212 PR OFFICE/OUTPT VISIT, EST, LEVL II, 10-19 MIN: ICD-10-PCS | Mod: S$GLB,,, | Performed by: PSYCHIATRY & NEUROLOGY

## 2019-02-12 PROCEDURE — 99999 PR PBB SHADOW E&M-EST. PATIENT-LVL II: ICD-10-PCS | Mod: PBBFAC,,, | Performed by: PSYCHIATRY & NEUROLOGY

## 2019-02-12 PROCEDURE — 99999 PR PBB SHADOW E&M-EST. PATIENT-LVL II: CPT | Mod: PBBFAC,,, | Performed by: PSYCHIATRY & NEUROLOGY

## 2019-02-12 RX ORDER — FOLIC ACID 1 MG/1
2 TABLET ORAL DAILY
Qty: 60 TABLET | Refills: 2 | Status: SHIPPED | OUTPATIENT
Start: 2019-02-12 | End: 2019-04-10 | Stop reason: SDUPTHER

## 2019-02-12 RX ORDER — FLUOXETINE HYDROCHLORIDE 40 MG/1
40 CAPSULE ORAL DAILY
Qty: 30 CAPSULE | Refills: 2 | Status: SHIPPED | OUTPATIENT
Start: 2019-02-12 | End: 2019-04-10 | Stop reason: SDUPTHER

## 2019-02-12 NOTE — PROGRESS NOTES
"Outpatient Psychiatry Initial Visit (MD/NP)    2/12/2019    Haley Henriquez, a 28 y.o. female, presenting for initial evaluation visit. Met with patient.    Reason for Encounter: self-referral. Patient complains of depression, anxiety and skin picking on head.    History of Present Illness:  Mood is "ok." Initially, did not sleep well; now, sleeping 7 hours per night. Had to be on steroids for respiratory illness. Picking head more since starting steroids. Anxiety is worse since starting steroids. Denies any SI/HI or any current plan/intent to self-harm or harm others. No vocalized delusions. No signs/symptoms of akila.    May get engaged before next appt.    Updated history   Psychiatric History:  Prior psych meds: Vyvanse for ADHD for 1.5 years ago, on Ambien for a couple years, Lexapro, Celexa, Cymbalta, Klonopin  Prior psych hospitalizations: 1x-senior year of college for SI  Prior outpatient psychiatrist: not for a long time  Previous suicide attempts: denies  Hx of violence: denies  Access to a gun: denies     Social:  Origin: [place], [place]; 2.5 years ago, moved here for boyfriend  Marital Status: never   Children: no kids  Other Support: boyfriend  Housing Status: with boyfriend, Briana  Employment Info:  for [employer]  Education: has her CPA  Special Ed: denies; needed help with reading comprehension     Family:  Family Psych Hx: both grandmothers-anxiety and depression, suicide attemp; both parents-anxiety and depression but not actively seeking help  Hx of phys/sexual abuse: denies     Legal:  Past charges/incarcerations: denies  Pending charges: denies     Substance Use:  Rec Drugs: marijuana-2x per month; cocaine-1 month ago  Use of EtOH: 2 glasses of wine 3-4 nights per week  Tobacco: denies  Rehab Hx: denies     PMHx  Slight asthma  BRCA2 gene  Seizures: denies  Head trauma/TBI: denies  Allergies: NKDA  Current meds:  -OCPs  -OTC allergy meds    Review Of Systems: " "    ROS  General ROS: negative for - chills or fever  Respiratory ROS: no cough, shortness of breath, or wheezing  Cardiovascular ROS: no chest pain or dyspnea on exertion  Gastrointestinal ROS: no abdominal pain, change in bowel habits, or black or bloody stools  Neurological ROS: negative for - headaches or weakness    Current Evaluation:     Nutritional Screening: Considering the patient's height and weight, medications, medical history and preferences, should a referral be made to the dietitian? no    Constitutional  Vitals:  Most recent vital signs, dated less than 90 days prior to this appointment, were reviewed.    Vitals:    02/12/19 0844   BP: 123/75   Pulse: 60   Weight: 58.1 kg (128 lb 1.4 oz)   Height: 5' 6" (1.676 m)        General:  unremarkable, age appropriate     Musculoskeletal  Muscle Strength/Tone:  not examined   Gait & Station:  non-ataxic     Psychiatric  Speech:  no latency; no press   Mood & Affect:  "ok"  congruent and appropriate   Thought Process:  normal and logical   Associations:  intact   Thought Content:  normal, no suicidality, no homicidality, delusions, or paranoia   Insight:  intact   Judgement: behavior is adequate to circumstances   Orientation:  grossly intact   Memory: intact for content of interview   Language: grossly intact   Attention Span & Concentration:  able to focus   Fund of Knowledge:  intact and appropriate to age and level of education       Relevant Elements of Neurological Exam: normal gait    Laboratory Data  No visits with results within 1 Month(s) from this visit.   Latest known visit with results is:   Admission on 12/08/2017, Discharged on 12/08/2017   Component Date Value Ref Range Status    POC Preg Test, Ur 12/08/2017 Negative  Negative Final     Acceptable 12/08/2017 Yes   Final         Medications  Outpatient Encounter Medications as of 2/12/2019   Medication Sig Dispense Refill    albuterol 90 mcg/actuation inhaler Inhale 2 puffs " into the lungs every 6 (six) hours as needed for Wheezing. Rescue      amoxicillin-clavulanate 875-125mg (AUGMENTIN) 875-125 mg per tablet Take 1 tablet by mouth every 12 (twelve) hours. 20 tablet 0    budesonide-formoterol 80-4.5 mcg (SYMBICORT) 80-4.5 mcg/actuation HFAA Inhale 2 puffs into the lungs 2 (two) times daily. Controller 10.2 g 2    cetirizine (ZYRTEC) 10 MG tablet Take 10 mg by mouth once daily.      FLUoxetine 20 MG capsule Take 1 capsule (20 mg total) by mouth once daily. 30 capsule 1    folic acid (FOLVITE) 1 MG tablet Take 2 tablets (2 mg total) by mouth once daily. 60 tablet 1    MARLISSA 0.15-0.03 mg per tablet Take 1 tablet by mouth once daily. 84 tablet 3    predniSONE (DELTASONE) 20 MG tablet Take 1 tablet (20 mg total) by mouth once daily. for 10 days 10 tablet 0     No facility-administered encounter medications on file as of 2/12/2019.            Assessment - Diagnosis - Goals:     Impression:  Major depressive disorder  Generalized anxiety disorder  R/O OCD    Treatment Plan/Recommendations:   -Increase to Prozac 40mg daily  -Continue folate 1mg BID    Discussed diagnosis, risks and benefits of proposed treatment vs alternative treatments vs no treatment, potential side effects of these treatments specifically for Prozac including but not limited to decreased appetite, dry mouth, sweating, nausea, constipation, sexual side effects in men or women, yawn, tremor, sleepiness, increased suicidal ideation and serotonin syndrome.    Return to Clinic: 2 months    Allyson Darga, MD [facility] Psychiatry, PGY-3

## 2019-02-26 ENCOUNTER — OFFICE VISIT (OUTPATIENT)
Dept: PSYCHIATRY | Facility: CLINIC | Age: 29
End: 2019-02-26
Payer: COMMERCIAL

## 2019-02-26 DIAGNOSIS — F41.1 GAD (GENERALIZED ANXIETY DISORDER): Primary | ICD-10-CM

## 2019-02-26 PROCEDURE — 99999 PR PBB SHADOW E&M-EST. PATIENT-LVL I: CPT | Mod: PBBFAC,,, | Performed by: SOCIAL WORKER

## 2019-02-26 PROCEDURE — 90832 PR PSYCHOTHERAPY W/PATIENT, 30 MIN: ICD-10-PCS | Mod: S$GLB,,, | Performed by: SOCIAL WORKER

## 2019-02-26 PROCEDURE — 90832 PSYTX W PT 30 MINUTES: CPT | Mod: S$GLB,,, | Performed by: SOCIAL WORKER

## 2019-02-26 PROCEDURE — 99999 PR PBB SHADOW E&M-EST. PATIENT-LVL I: ICD-10-PCS | Mod: PBBFAC,,, | Performed by: SOCIAL WORKER

## 2019-03-18 ENCOUNTER — OFFICE VISIT (OUTPATIENT)
Dept: PSYCHIATRY | Facility: CLINIC | Age: 29
End: 2019-03-18
Payer: COMMERCIAL

## 2019-03-18 DIAGNOSIS — F32.A DEPRESSION, UNSPECIFIED DEPRESSION TYPE: ICD-10-CM

## 2019-03-18 DIAGNOSIS — F41.1 GAD (GENERALIZED ANXIETY DISORDER): Primary | ICD-10-CM

## 2019-03-18 PROCEDURE — 90832 PSYTX W PT 30 MINUTES: CPT | Mod: S$GLB,,, | Performed by: SOCIAL WORKER

## 2019-03-18 PROCEDURE — 99999 PR PBB SHADOW E&M-EST. PATIENT-LVL I: CPT | Mod: PBBFAC,,, | Performed by: SOCIAL WORKER

## 2019-03-18 PROCEDURE — 90832 PR PSYCHOTHERAPY W/PATIENT, 30 MIN: ICD-10-PCS | Mod: S$GLB,,, | Performed by: SOCIAL WORKER

## 2019-03-18 PROCEDURE — 99999 PR PBB SHADOW E&M-EST. PATIENT-LVL I: ICD-10-PCS | Mod: PBBFAC,,, | Performed by: SOCIAL WORKER

## 2019-03-19 NOTE — PROGRESS NOTES
Individual Psychotherapy (PhD/LCSW)    3/18/2019    Site:  [facility]         Therapeutic Intervention: Met with patient.  Outpatient - Insight oriented psychotherapy 30 min - CPT code 14465    Chief complaint/reason for encounter: depression, mood swings, anger, anxiety, sleep, appetite, behavior and interpersonal     Interval history and content of current session: discussed her father will not support her marriage to her current fiance, the father does not like him and thinks he has not enough positive qualities for his daughter, other family are more flexible yet have opinions, she is upset and confused on what to do, discussed she still pulls her hair out, I want to try hypnosis with her at the next session and she is agreeable and did not have adequate time to do so for today, went over taking care of herself, talk with her fiance and they share what they want to do and then decided and also what does she want to do addressed also, her feelings, and what is important for her.    Treatment plan:  · Target symptoms: depression, recurrent depression, anxiety , mood swings, mood disorder, adjustment, grief  · Why chosen therapy is appropriate versus another modality: relevant to diagnosis, patient responds to this modality, evidence based practice  · Outcome monitoring methods: self-report, observation  · Therapeutic intervention type: insight oriented psychotherapy, behavior modifying psychotherapy, supportive psychotherapy    Risk parameters:  Patient reports no suicidal ideation  Patient reports no homicidal ideation  Patient reports no self-injurious behavior  Patient reports no violent behavior    Verbal deficits: None    Patient's response to intervention:  The patient's response to intervention is accepting, motivated.    Progress toward goals and other mental status changes:  The patient's progress toward goals is fair .    Diagnosis:     ICD-10-CM ICD-9-CM   1. STEPHANIE (generalized anxiety disorder)  F41.1 300.02   2. Depression, unspecified depression type F32.9 311       Plan:  individual psychotherapy and consult psychiatrist for medication evaluation    Return to clinic: 2 weeks    Length of Service (minutes): 30

## 2019-04-09 ENCOUNTER — DOCUMENTATION ONLY (OUTPATIENT)
Dept: PSYCHIATRY | Facility: CLINIC | Age: 29
End: 2019-04-09

## 2019-04-10 ENCOUNTER — OFFICE VISIT (OUTPATIENT)
Dept: PSYCHIATRY | Facility: CLINIC | Age: 29
End: 2019-04-10
Payer: COMMERCIAL

## 2019-04-10 VITALS
DIASTOLIC BLOOD PRESSURE: 61 MMHG | HEART RATE: 62 BPM | WEIGHT: 126.44 LBS | BODY MASS INDEX: 20.41 KG/M2 | SYSTOLIC BLOOD PRESSURE: 117 MMHG

## 2019-04-10 DIAGNOSIS — F32.A DEPRESSION, UNSPECIFIED DEPRESSION TYPE: Primary | ICD-10-CM

## 2019-04-10 DIAGNOSIS — F41.0 PANIC ATTACKS: ICD-10-CM

## 2019-04-10 DIAGNOSIS — F41.1 GAD (GENERALIZED ANXIETY DISORDER): ICD-10-CM

## 2019-04-10 PROCEDURE — 99999 PR PBB SHADOW E&M-EST. PATIENT-LVL II: CPT | Mod: PBBFAC,,, | Performed by: PSYCHIATRY & NEUROLOGY

## 2019-04-10 PROCEDURE — 99999 PR PBB SHADOW E&M-EST. PATIENT-LVL II: ICD-10-PCS | Mod: PBBFAC,,, | Performed by: PSYCHIATRY & NEUROLOGY

## 2019-04-10 PROCEDURE — 99212 PR OFFICE/OUTPT VISIT, EST, LEVL II, 10-19 MIN: ICD-10-PCS | Mod: S$GLB,,, | Performed by: PSYCHIATRY & NEUROLOGY

## 2019-04-10 PROCEDURE — 99212 OFFICE O/P EST SF 10 MIN: CPT | Mod: S$GLB,,, | Performed by: PSYCHIATRY & NEUROLOGY

## 2019-04-10 RX ORDER — FLUOXETINE HYDROCHLORIDE 40 MG/1
80 CAPSULE ORAL DAILY
Qty: 60 CAPSULE | Refills: 1 | Status: SHIPPED | OUTPATIENT
Start: 2019-04-10 | End: 2019-05-22 | Stop reason: SDUPTHER

## 2019-04-10 RX ORDER — FOLIC ACID 1 MG/1
2 TABLET ORAL DAILY
Qty: 60 TABLET | Refills: 1 | Status: SHIPPED | OUTPATIENT
Start: 2019-04-10 | End: 2019-05-22 | Stop reason: SDUPTHER

## 2019-04-10 NOTE — PROGRESS NOTES
"Outpatient Psychiatry Follow Up Visit    4/10/2019    Haley Henriquez, a 29 y.o. female, presenting for depression, anxiety and skin picking on head.    History of Present Illness:  Patient is "not good." Boyfriend asked dad's permission to  patient; he declined. Boyfriend and patient not getting along. Hoping to go to couples therapy. Waking up early even though she does not have to. Not exercising as much. Skin picking is worse than before. Missed Dr. Peng's appt because she did not think 30 minutes would be enough. Denies any SI/HI or any current plan/intent to self-harm or harm others. No vocalized delusions. No signs/symptoms of akila.    History reviewed and updated below as necessary:  Psychiatric History:  Prior psych meds: Vyvanse for ADHD for 1.5 years ago, on Ambien for a couple years, Lexapro, Celexa, Cymbalta, Klonopin  Prior psych hospitalizations: 1x-senior year of college for SI  Prior outpatient psychiatrist: not for a long time  Previous suicide attempts: denies  Hx of violence: denies  Access to a gun: denies     Social:  Origin: [place], [place]; 2.5 years ago, moved here for boyfriend  Marital Status: never   Children: no kids  Other Support: boyfriend  Housing Status: with boyfriend, Briana  Employment Info:  for [employer]  Education: has her CPA  Special Ed: denies; needed help with reading comprehension     Family:  Family Psych Hx: both grandmothers-anxiety and depression, suicide attemp; both parents-anxiety and depression but not actively seeking help  Hx of phys/sexual abuse: denies     Legal:  Past charges/incarcerations: denies  Pending charges: denies     Substance Use:  Rec Drugs: marijuana-2x per month; cocaine-1 month ago  Use of EtOH: 2 glasses of wine 3-4 nights per week  Tobacco: denies  Rehab Hx: denies     PMHx  Slight asthma  BRCA2 gene  Seizures: denies  Head trauma/TBI: denies  Allergies: NKDA  Current meds:  -OCPs  -OTC allergy " meds    Review Of Systems:     ROS  General ROS: negative for - chills or fever  Respiratory ROS: no cough, shortness of breath, or wheezing  Cardiovascular ROS: no chest pain or dyspnea on exertion  Gastrointestinal ROS: no abdominal pain, change in bowel habits, or black or bloody stools  Neurological ROS: negative for - headaches or weakness    Current Evaluation:     Nutritional Screening: Considering the patient's height and weight, medications, medical history and preferences, should a referral be made to the dietitian? no    Constitutional  Vitals:  Most recent vital signs, dated less than 90 days prior to this appointment, were reviewed.    Vitals:    04/10/19 0844   BP: 117/61   Pulse: 62   Weight: 57.3 kg (126 lb 6.9 oz)        General:  unremarkable, age appropriate     Musculoskeletal  Muscle Strength/Tone:  not examined   Gait & Station:  non-ataxic     Psychiatric  Speech:  no latency; no press   Mood & Affect:  not good  congruent and appropriate   Thought Process:  normal and logical   Associations:  intact   Thought Content:  normal, no suicidality, no homicidality, delusions, or paranoia   Insight:  intact   Judgement: behavior is adequate to circumstances   Orientation:  grossly intact   Memory: intact for content of interview   Language: grossly intact   Attention Span & Concentration:  able to focus   Fund of Knowledge:  intact and appropriate to age and level of education       Relevant Elements of Neurological Exam: normal gait    Laboratory Data  No visits with results within 1 Month(s) from this visit.   Latest known visit with results is:   Admission on 12/08/2017, Discharged on 12/08/2017   Component Date Value Ref Range Status    POC Preg Test, Ur 12/08/2017 Negative  Negative Final     Acceptable 12/08/2017 Yes   Final         Medications  Outpatient Encounter Medications as of 4/10/2019   Medication Sig Dispense Refill    albuterol 90 mcg/actuation inhaler Inhale 2  puffs into the lungs every 6 (six) hours as needed for Wheezing. Rescue      amoxicillin-clavulanate 875-125mg (AUGMENTIN) 875-125 mg per tablet Take 1 tablet by mouth every 12 (twelve) hours. 20 tablet 0    budesonide-formoterol 80-4.5 mcg (SYMBICORT) 80-4.5 mcg/actuation HFAA Inhale 2 puffs into the lungs 2 (two) times daily. Controller 10.2 g 2    cetirizine (ZYRTEC) 10 MG tablet Take 10 mg by mouth once daily.      FLUoxetine 40 MG capsule Take 1 capsule (40 mg total) by mouth once daily. 30 capsule 2    folic acid (FOLVITE) 1 MG tablet Take 2 tablets (2 mg total) by mouth once daily. 60 tablet 2    MARLISSA 0.15-0.03 mg per tablet Take 1 tablet by mouth once daily. 84 tablet 3     No facility-administered encounter medications on file as of 4/10/2019.            Assessment - Diagnosis - Goals:     Impression:  Major depressive disorder  Generalized anxiety disorder  R/O OCD    Treatment Plan/Recommendations:   -Increase to Prozac 80mg daily  -Continue folate 1mg BID    Discussed diagnosis, risks and benefits of proposed treatment vs alternative treatments vs no treatment, potential side effects of these treatments specifically for Prozac including but not limited to decreased appetite, dry mouth, sweating, nausea, constipation, sexual side effects in men or women, yawn, tremor, sleepiness, increased suicidal ideation and serotonin syndrome.    Return to Clinic: 2 months    Allyson Darga, MD [facility], PGY-3

## 2019-04-18 ENCOUNTER — PATIENT MESSAGE (OUTPATIENT)
Dept: PSYCHIATRY | Facility: CLINIC | Age: 29
End: 2019-04-18

## 2019-04-30 ENCOUNTER — OFFICE VISIT (OUTPATIENT)
Dept: URGENT CARE | Facility: CLINIC | Age: 29
End: 2019-04-30
Payer: COMMERCIAL

## 2019-04-30 VITALS
BODY MASS INDEX: 20.57 KG/M2 | HEART RATE: 69 BPM | SYSTOLIC BLOOD PRESSURE: 117 MMHG | OXYGEN SATURATION: 98 % | WEIGHT: 128 LBS | RESPIRATION RATE: 16 BRPM | TEMPERATURE: 97 F | DIASTOLIC BLOOD PRESSURE: 77 MMHG | HEIGHT: 66 IN

## 2019-04-30 DIAGNOSIS — J02.9 SORE THROAT: ICD-10-CM

## 2019-04-30 DIAGNOSIS — J02.9 ACUTE PHARYNGITIS, UNSPECIFIED ETIOLOGY: Primary | ICD-10-CM

## 2019-04-30 LAB
CTP QC/QA: YES
CTP QC/QA: YES
HETEROPH AB SER QL: NEGATIVE
S PYO RRNA THROAT QL PROBE: NEGATIVE

## 2019-04-30 PROCEDURE — 87880 STREP A ASSAY W/OPTIC: CPT | Mod: QW,S$GLB,, | Performed by: NURSE PRACTITIONER

## 2019-04-30 PROCEDURE — 96372 THER/PROPH/DIAG INJ SC/IM: CPT | Mod: S$GLB,,, | Performed by: EMERGENCY MEDICINE

## 2019-04-30 PROCEDURE — 86308 HETEROPHILE ANTIBODY SCREEN: CPT | Mod: QW,S$GLB,, | Performed by: NURSE PRACTITIONER

## 2019-04-30 PROCEDURE — 87880 POCT RAPID STREP A: ICD-10-PCS | Mod: QW,S$GLB,, | Performed by: NURSE PRACTITIONER

## 2019-04-30 PROCEDURE — 87081 CULTURE SCREEN ONLY: CPT

## 2019-04-30 PROCEDURE — 96372 PR INJECTION,THERAP/PROPH/DIAG2ST, IM OR SUBCUT: ICD-10-PCS | Mod: S$GLB,,, | Performed by: EMERGENCY MEDICINE

## 2019-04-30 PROCEDURE — 99214 OFFICE O/P EST MOD 30 MIN: CPT | Mod: 25,S$GLB,, | Performed by: NURSE PRACTITIONER

## 2019-04-30 PROCEDURE — 86308 POCT INFECTIOUS MONONUCLEOSIS: ICD-10-PCS | Mod: QW,S$GLB,, | Performed by: NURSE PRACTITIONER

## 2019-04-30 PROCEDURE — 99214 PR OFFICE/OUTPT VISIT, EST, LEVL IV, 30-39 MIN: ICD-10-PCS | Mod: 25,S$GLB,, | Performed by: NURSE PRACTITIONER

## 2019-04-30 RX ORDER — AMOXICILLIN AND CLAVULANATE POTASSIUM 875; 125 MG/1; MG/1
1 TABLET, FILM COATED ORAL 2 TIMES DAILY
Qty: 20 TABLET | Refills: 0 | Status: SHIPPED | OUTPATIENT
Start: 2019-04-30 | End: 2019-05-10

## 2019-04-30 RX ORDER — BETAMETHASONE SODIUM PHOSPHATE AND BETAMETHASONE ACETATE 3; 3 MG/ML; MG/ML
6 INJECTION, SUSPENSION INTRA-ARTICULAR; INTRALESIONAL; INTRAMUSCULAR; SOFT TISSUE
Status: COMPLETED | OUTPATIENT
Start: 2019-04-30 | End: 2019-04-30

## 2019-04-30 RX ADMIN — BETAMETHASONE SODIUM PHOSPHATE AND BETAMETHASONE ACETATE 6 MG: 3; 3 INJECTION, SUSPENSION INTRA-ARTICULAR; INTRALESIONAL; INTRAMUSCULAR; SOFT TISSUE at 03:04

## 2019-04-30 NOTE — LETTER
April 30, 2019      [facility] [address]  Phone: [phone]  Fax: [phone]       Patient: Haley Henriquez   YOB: 1990  Date of Visit: 04/30/2019    To Whom It May Concern:    Marcella Henriquez  was at [facility] on 04/30/2019. She may return to work/school on 5/2/19 with no restrictions. If you have any questions or concerns, or if I can be of further assistance, please do not hesitate to contact me.    Sincerely,        Katlin Rooney NP

## 2019-04-30 NOTE — PATIENT INSTRUCTIONS
Pharyngitis   If your condition worsens or fails to improve we recommend that you receive another evaluation at the ER immediately or contact your PCP to discuss your concerns or return here. You must understand that you've received an urgent care treatment only and that you may be released before all your medical problems are known or treated. You the patient will arrange for followup care as instructed.  Call [phone] to schedule appointment    Tylenol or ibuprofen for pain may help as long as you are not allergic to these meds or have a medical condition such as stomach ulcers, liver or kidney disease or taking blood thinners etc that would   prevent you from using these medications.   Rest and fluids will help as well.   If you were prescribed antibiotics and are female and on BCP use additional methods to prevent pregnancy while on the antibiotics and for one cycle after       Pharyngitis (Sore Throat), Report Pending    Pharyngitis (sore throat) is often due to a virus. It can also be caused by the streptococcus, or strep, bacterium, often called strep throat. Both viral and strep infections can cause throat pain that is worse when swallowing, aching all over with headache, and fever. Both types of infections are contagious. They may be spread by coughing, kissing, or touching others after touching your mouth or nose.  A test has been done to find out whether you (or your child, if your child is the patient) have strep throat. Call this facility or your healthcare provider if you were not given your test results. If the test is positive for strep infection, you will need to take antibiotic medicines. A prescription can be called into your pharmacy at that time. If the test is negative, you probably have a viral pharyngitis. This does not need to be treated with antibiotics. Until you receive the results of the strep test, you should stay home from work. If  your child is being tested, he or she should stay home from school.  Home care  · Rest at home. Drink plenty of fluids so you won't get dehydrated.  · If the test is positive for strep, don't go to work or school for the first 2 days of taking the antibiotics. After this time, you will not be contagious. You can then return to work or school if you are feeling better.   · Take the antibiotic medicine for the full 10 days, even if you feel better. This is very important to make sure the infection is treated. It is also important to prevent drug-resistant germs from developing. If you were given an antibiotic shot, you won't need more antibiotics.  · For children: Use acetaminophen for fever, fussiness, or discomfort. In infants older than 6 months of age, you may use ibuprofen instead of acetaminophen. Talk with your child's healthcare provider before giving these medicines if your child has chronic liver or kidney disease or ever had a stomach ulcer or GI bleeding. Never give aspirin to a child under 18 years of age who is ill with a fever. It may cause severe liver damage.  · For adults: Use acetaminophen or ibuprofen to control pain or fever, unless another medicine was prescribed for this. Talk with your healthcare provider before taking these medicines if you have chronic liver or kidney disease or ever had a stomach ulcer or GI bleeding.  · Use throat lozenges or numbing throat sprays to help reduce pain. Gargling with warm salt water will also help reduce throat pain. For this, dissolve 1/2 teaspoon of salt in 1 glass of warm water. To help soothe a sore throat, children can sip on juice or a popsicle. Children 5 years and older can also suck on a lollipop or hard candy.  · Don't eat salty or spicy foods. These can irritate the throat.  Follow-up care  Follow up with your healthcare provider or our staff if you don't get better over the next week.  When to seek medical advice  Call your healthcare provider  right away if any of these occur:  · Fever as directed by your healthcare provider. For children, seek care if:  ¨ Your child is of any age and has repeated fevers above 104°F (40°C).  ¨ Your child is younger than 2 years of age and has a fever of 100.4°F (38°C) that continues for more than 1 day.  ¨ Your child is 2 years old or older and has a fever of 100.4°F (38°C) that continues for more than 3 days.  · New or worsening ear pain, sinus pain, or headache  · Painful lumps in the back of neck  · Stiff neck  · Lymph nodes are getting larger  · Inability to swallow liquids, excessive drooling, or inability to open mouth wide due to throat pain  · Signs of dehydration (very dark urine or no urine, sunken eyes, dizziness)  · Trouble breathing or noisy breathing  · Muffled voice  · New rash  · Child appears to be getting sicker  Date Last Reviewed: 4/13/2015  © 4935-9990 [employer]. [address] [place] [address], [place] [address]. All rights reserved. This information is not intended as a substitute for professional medical care. Always follow your healthcare professional's instructions.

## 2019-04-30 NOTE — PROGRESS NOTES
"Subjective:       Patient ID: Haely Henriquez is a 29 y.o. female.    Vitals:  height is 5' 6" (1.676 m) and weight is 58.1 kg (128 lb). Her temperature is 97.1 °F (36.2 °C). Her blood pressure is 117/77 and her pulse is 69. Her respiration is 16 and oxygen saturation is 98%.     Chief Complaint: Jaw Pain (left side x 5 days)    Patient presents with complaint of left-sided jaw pain that is radiating down her neck and into her ear.  States that it is only on the left side.  She has a history of TMJ and thought that maybe it was this but states that her TMJ is never hurt this bad.  She is having difficulty swallowing.  She states that she is hungry but she tried to eat a banana and was very painful.  She does have an associated sore throat and is nontender over her TMJ.  She took 600 of ibuprofen this morning with no relief and she has been using her  with no relief.  She does not feel her jaw popping or clicking.  No fever chills.  No sick contacts.      Dental Pain    This is a new problem. The current episode started in the past 7 days. The problem occurs constantly. The problem has been gradually worsening. The pain is at a severity of 9/10. Associated symptoms include difficulty swallowing. Pertinent negatives include no facial pain, fever, oral bleeding, sinus pressure or thermal sensitivity. She has tried NSAIDs for the symptoms. The treatment provided mild relief.       Constitution: Negative for chills, fatigue and fever.   HENT: Positive for ear pain, sore throat and trouble swallowing. Negative for dental problem, drooling, facial swelling, facial trauma, congestion, sinus pressure and voice change.    Neck: Negative for painful lymph nodes.   Cardiovascular: Negative for chest pain and leg swelling.   Eyes: Negative for double vision and blurred vision.   Respiratory: Negative for cough and shortness of breath.    Gastrointestinal: Negative for nausea, vomiting and diarrhea. "   Genitourinary: Negative for dysuria, frequency, urgency and history of kidney stones.   Musculoskeletal: Positive for pain (Not actually at the jaw.). Negative for trauma, joint pain, joint swelling, abnormal ROM of joint, muscle cramps and muscle ache.   Skin: Negative for color change, pale, rash and bruising.   Allergic/Immunologic: Negative for seasonal allergies.   Neurological: Negative for dizziness, history of vertigo, light-headedness, passing out and headaches.   Hematologic/Lymphatic: Negative for swollen lymph nodes.   Psychiatric/Behavioral: Negative for nervous/anxious, sleep disturbance and depression. The patient is not nervous/anxious.        Objective:      Physical Exam   Constitutional: She is oriented to person, place, and time. She appears well-developed and well-nourished. She is cooperative.  Non-toxic appearance. She does not appear ill. No distress.   HENT:   Head: Normocephalic and atraumatic.   Right Ear: Hearing, tympanic membrane, external ear and ear canal normal.   Left Ear: Hearing, tympanic membrane, external ear and ear canal normal.   Nose: Nose normal. No mucosal edema, rhinorrhea or nasal deformity. No epistaxis. Right sinus exhibits no maxillary sinus tenderness and no frontal sinus tenderness. Left sinus exhibits no maxillary sinus tenderness and no frontal sinus tenderness.   Mouth/Throat: Uvula is midline and mucous membranes are normal. There is trismus (mild trismus with no uvular deviation) in the jaw. Normal dentition. No uvula swelling. Posterior oropharyngeal edema and posterior oropharyngeal erythema present. No oropharyngeal exudate or tonsillar abscesses. Tonsils are 1+ on the right. Tonsils are 2+ on the left. No tonsillar exudate.   Eyes: Conjunctivae and lids are normal. No scleral icterus.   Sclera clear bilat   Neck: Trachea normal, normal range of motion, full passive range of motion without pain and phonation normal. Neck supple. No spinous process  tenderness and no muscular tenderness present. No neck rigidity. No edema, no erythema and normal range of motion present.   Cardiovascular: Normal rate, regular rhythm, normal heart sounds, intact distal pulses and normal pulses.   Pulmonary/Chest: Effort normal and breath sounds normal. No respiratory distress.   Abdominal: Soft. Normal appearance and bowel sounds are normal. She exhibits no distension. There is no tenderness.   Musculoskeletal: Normal range of motion. She exhibits no edema or deformity.   Lymphadenopathy:     She has cervical adenopathy.        Right cervical: No superficial cervical adenopathy present.       Left cervical: Superficial cervical adenopathy present.   Neurological: She is alert and oriented to person, place, and time. She exhibits normal muscle tone. Coordination normal.   Skin: Skin is warm, dry and intact. She is not diaphoretic. No pallor.   Psychiatric: She has a normal mood and affect. Her speech is normal and behavior is normal. Judgment and thought content normal. Cognition and memory are normal.   Nursing note and vitals reviewed.      Results for orders placed or performed in visit on 04/30/19   POCT rapid strep A   Result Value Ref Range    Rapid Strep A Screen Negative Negative     Acceptable Yes    POCT Infectious mononucleosis antibody   Result Value Ref Range    Monospot Negative Negative     Acceptable Yes      Assessment:       1. Acute pharyngitis, unspecified etiology    2. Sore throat        Plan:         Acute pharyngitis, unspecified etiology  -     amoxicillin-clavulanate 875-125mg (AUGMENTIN) 875-125 mg per tablet; Take 1 tablet by mouth 2 (two) times daily. for 10 days  Dispense: 20 tablet; Refill: 0  -     betamethasone acetate-betamethasone sodium phosphate injection 6 mg  -     Strep A culture, throat  -     (Magic mouthwash) 1:1:1 Benadryl 12.5mg/5ml liq, aluminum & magnesium hydroxide-simehticone (Maalox), lidocaine viscous  2%; Swish and spit 10 mLs every 4 (four) hours as needed.  Dispense: 180 mL; Refill: 0    Sore throat  -     POCT rapid strep A  -     POCT Infectious mononucleosis antibody      Patient Instructions                                                         Pharyngitis   If your condition worsens or fails to improve we recommend that you receive another evaluation at the ER immediately or contact your PCP to discuss your concerns or return here. You must understand that you've received an urgent care treatment only and that you may be released before all your medical problems are known or treated. You the patient will arrange for followup care as instructed.  Call [phone] to schedule appointment    Tylenol or ibuprofen for pain may help as long as you are not allergic to these meds or have a medical condition such as stomach ulcers, liver or kidney disease or taking blood thinners etc that would   prevent you from using these medications.   Rest and fluids will help as well.   If you were prescribed antibiotics and are female and on BCP use additional methods to prevent pregnancy while on the antibiotics and for one cycle after       Pharyngitis (Sore Throat), Report Pending    Pharyngitis (sore throat) is often due to a virus. It can also be caused by the streptococcus, or strep, bacterium, often called strep throat. Both viral and strep infections can cause throat pain that is worse when swallowing, aching all over with headache, and fever. Both types of infections are contagious. They may be spread by coughing, kissing, or touching others after touching your mouth or nose.  A test has been done to find out whether you (or your child, if your child is the patient) have strep throat. Call this facility or your healthcare provider if you were not given your test results. If the test is positive for strep infection, you will need to take antibiotic medicines. A prescription can be called into your pharmacy at that  time. If the test is negative, you probably have a viral pharyngitis. This does not need to be treated with antibiotics. Until you receive the results of the strep test, you should stay home from work. If your child is being tested, he or she should stay home from school.  Home care  · Rest at home. Drink plenty of fluids so you won't get dehydrated.  · If the test is positive for strep, don't go to work or school for the first 2 days of taking the antibiotics. After this time, you will not be contagious. You can then return to work or school if you are feeling better.   · Take the antibiotic medicine for the full 10 days, even if you feel better. This is very important to make sure the infection is treated. It is also important to prevent drug-resistant germs from developing. If you were given an antibiotic shot, you won't need more antibiotics.  · For children: Use acetaminophen for fever, fussiness, or discomfort. In infants older than 6 months of age, you may use ibuprofen instead of acetaminophen. Talk with your child's healthcare provider before giving these medicines if your child has chronic liver or kidney disease or ever had a stomach ulcer or GI bleeding. Never give aspirin to a child under 18 years of age who is ill with a fever. It may cause severe liver damage.  · For adults: Use acetaminophen or ibuprofen to control pain or fever, unless another medicine was prescribed for this. Talk with your healthcare provider before taking these medicines if you have chronic liver or kidney disease or ever had a stomach ulcer or GI bleeding.  · Use throat lozenges or numbing throat sprays to help reduce pain. Gargling with warm salt water will also help reduce throat pain. For this, dissolve 1/2 teaspoon of salt in 1 glass of warm water. To help soothe a sore throat, children can sip on juice or a popsicle. Children 5 years and older can also suck on a lollipop or hard candy.  · Don't eat salty or spicy foods.  These can irritate the throat.  Follow-up care  Follow up with your healthcare provider or our staff if you don't get better over the next week.  When to seek medical advice  Call your healthcare provider right away if any of these occur:  · Fever as directed by your healthcare provider. For children, seek care if:  ¨ Your child is of any age and has repeated fevers above 104°F (40°C).  ¨ Your child is younger than 2 years of age and has a fever of 100.4°F (38°C) that continues for more than 1 day.  ¨ Your child is 2 years old or older and has a fever of 100.4°F (38°C) that continues for more than 3 days.  · New or worsening ear pain, sinus pain, or headache  · Painful lumps in the back of neck  · Stiff neck  · Lymph nodes are getting larger  · Inability to swallow liquids, excessive drooling, or inability to open mouth wide due to throat pain  · Signs of dehydration (very dark urine or no urine, sunken eyes, dizziness)  · Trouble breathing or noisy breathing  · Muffled voice  · New rash  · Child appears to be getting sicker  Date Last Reviewed: 4/13/2015  © 8904-7817 [employer]. [address] [place] [address], [place] [address]. All rights reserved. This information is not intended as a substitute for professional medical care. Always follow your healthcare professional's instructions.

## 2019-05-02 ENCOUNTER — TELEPHONE (OUTPATIENT)
Dept: URGENT CARE | Facility: CLINIC | Age: 29
End: 2019-05-02

## 2019-05-02 ENCOUNTER — OFFICE VISIT (OUTPATIENT)
Dept: URGENT CARE | Facility: CLINIC | Age: 29
End: 2019-05-02
Payer: COMMERCIAL

## 2019-05-02 ENCOUNTER — CLINICAL SUPPORT (OUTPATIENT)
Dept: URGENT CARE | Facility: CLINIC | Age: 29
End: 2019-05-02
Payer: COMMERCIAL

## 2019-05-02 VITALS
WEIGHT: 128 LBS | HEART RATE: 81 BPM | OXYGEN SATURATION: 99 % | TEMPERATURE: 99 F | HEIGHT: 66 IN | TEMPERATURE: 99 F | RESPIRATION RATE: 12 BRPM | DIASTOLIC BLOOD PRESSURE: 52 MMHG | BODY MASS INDEX: 20.57 KG/M2 | OXYGEN SATURATION: 99 % | WEIGHT: 128 LBS | DIASTOLIC BLOOD PRESSURE: 52 MMHG | RESPIRATION RATE: 12 BRPM | BODY MASS INDEX: 20.57 KG/M2 | SYSTOLIC BLOOD PRESSURE: 97 MMHG | SYSTOLIC BLOOD PRESSURE: 97 MMHG | HEIGHT: 66 IN | HEART RATE: 81 BPM

## 2019-05-02 DIAGNOSIS — J02.9 ACUTE PHARYNGITIS, UNSPECIFIED ETIOLOGY: Primary | ICD-10-CM

## 2019-05-02 LAB — BACTERIA THROAT CULT: NORMAL

## 2019-05-02 PROCEDURE — 70360 XR NECK SOFT TISSUE: ICD-10-PCS | Mod: FY,S$GLB,, | Performed by: RADIOLOGY

## 2019-05-02 PROCEDURE — 99214 PR OFFICE/OUTPT VISIT, EST, LEVL IV, 30-39 MIN: ICD-10-PCS | Mod: S$GLB,,, | Performed by: EMERGENCY MEDICINE

## 2019-05-02 PROCEDURE — 99214 OFFICE O/P EST MOD 30 MIN: CPT | Mod: S$GLB,,, | Performed by: EMERGENCY MEDICINE

## 2019-05-02 PROCEDURE — 70360 X-RAY EXAM OF NECK: CPT | Mod: FY,S$GLB,, | Performed by: RADIOLOGY

## 2019-05-02 NOTE — PROGRESS NOTES
"Subjective:       Patient ID: Haley Henriquez is a 29 y.o. female.    Vitals:  height is 5' 6" (1.676 m) and weight is 58.1 kg (128 lb). Her tympanic temperature is 98.7 °F (37.1 °C). Her blood pressure is 97/52 (abnormal) and her pulse is 81. Her respiration is 12 and oxygen saturation is 99%.     Chief Complaint: Sore Throat    Patient was seen 2 days ago for similar symptoms.  She did feel a little bit better yesterday, but today she feels as if there is something in her throat.  Still painful to eat and swallow.  She has currently on day 2 of antibiotics.      Sore Throat    This is a new problem. The current episode started in the past 7 days. The problem has been gradually worsening. The pain is worse on the left side. There has been no fever. The pain is at a severity of 6/10. The pain is moderate. Associated symptoms include ear pain, neck pain, swollen glands and trouble swallowing. Pertinent negatives include no abdominal pain, congestion, coughing, diarrhea, drooling, ear discharge, headaches, hoarse voice, plugged ear sensation, shortness of breath, stridor or vomiting. She has had no exposure to strep or mono. She has tried oral narcotic analgesics and gargles for the symptoms. The treatment provided mild relief.       Constitution: Negative for chills, sweating, fatigue and fever.   HENT: Positive for ear pain, sore throat and trouble swallowing. Negative for ear discharge, drooling, congestion, sinus pain, sinus pressure and voice change.    Neck: Positive for neck pain. Negative for painful lymph nodes.   Eyes: Negative for eye redness.   Respiratory: Negative for chest tightness, cough, sputum production, bloody sputum, COPD, shortness of breath, stridor, wheezing and asthma.    Gastrointestinal: Negative for abdominal pain, nausea, vomiting and diarrhea.   Musculoskeletal: Negative for muscle ache.   Skin: Negative for rash.   Allergic/Immunologic: Negative for seasonal allergies and asthma. "   Neurological: Negative for headaches.   Hematologic/Lymphatic: Negative for swollen lymph nodes.       Objective:      Physical Exam    Assessment:       No diagnosis found.    Plan:         There are no diagnoses linked to this encounter.

## 2019-05-02 NOTE — PATIENT INSTRUCTIONS
Continue taking your medication as previously directed.    Greyson Loja MD  Go to the Emergency Department for any problems  Call your PCP for follow up next available.    Self-Care for Sore Throats    Sore throats happen for many reasons, such as colds, allergies, and infections caused by viruses or bacteria. In any case, your throat becomes red and sore. Your goal for self-care is to reduce your discomfort while giving your throat a chance to heal.  Moisten and soothe your throat  Tips include the following:  · Try a sip of water first thing after waking up.  · Keep your throat moist by drinking 6 or more glasses of clear liquids every day.  · Run a cool-air humidifier in your room overnight.  · Avoid cigarette smoke.   · Suck on throat lozenges, cough drops, hard candy, ice chips, or frozen fruit-juice bars. Use the sugar-free versions if your diet or medical condition requires them.  Gargle to ease irritation  Gargling every hour or 2 can ease irritation. Try gargling with 1 of these solutions:  · 1/4 teaspoon of salt in 1/2 cup of warm water  · An over-the-counter anesthetic gargle  Use medicine for more relief  Over-the-counter medicine can reduce sore throat symptoms. Ask your pharmacist if you have questions about which medicine to use:  · Ease pain with anesthetic sprays. Aspirin or an aspirin substitute also helps. Remember, never give aspirin to anyone 18 or younger, or if you are already taking blood thinners.   · For sore throats caused by allergies, try antihistamines to block the allergic reaction.  · Remember: unless a sore throat is caused by a bacterial infection, antibiotics wont help you.  Prevent future sore throats  Prevention tips include the following:  · Stop smoking or reduce contact with secondhand smoke. Smoke irritates the tender throat lining.  · Limit contact with pets and with allergy-causing substances, such as pollen and mold.  · When youre around someone with a sore throat or  cold, wash your hands often to keep viruses or bacteria from spreading.  · Dont strain your vocal cords.  Call your healthcare provider  Contact your healthcare provider if you have:  · A temperature over 101°F (38.3°C)  · White spots on the throat  · Great difficulty swallowing  · Trouble breathing  · A skin rash  · Recent exposure to someone else with strep bacteria  · Severe hoarseness and swollen glands in the neck or jaw   Date Last Reviewed: 8/1/2016  © 5888-6073 [employer]. [address] [place] [address]. All rights reserved. This information is not intended as a substitute for professional medical care. Always follow your healthcare professional's instructions.

## 2019-05-02 NOTE — PROGRESS NOTES
"Subjective:       Patient ID: Haley Henriquez is a 29 y.o. female.    Vitals:  height is 5' 6" (1.676 m) and weight is 58.1 kg (128 lb). Her tympanic temperature is 98.7 °F (37.1 °C). Her blood pressure is 97/52 (abnormal) and her pulse is 81. Her respiration is 12 and oxygen saturation is 99%.     Chief Complaint: Sore Throat    Patient was seen 2 days ago for similar symptoms, chart reviewed, starting feeling better yesterday and today with left facial/throat discomfort, states left side of throat feels like something stuck, handling own saliva, is not pregnant.    Sore Throat    This is a new problem. The current episode started in the past 7 days. The problem has been unchanged. The pain is worse on the left side. There has been no fever. The pain is at a severity of 6/10. The pain is moderate. Associated symptoms include ear pain, swollen glands and trouble swallowing. Pertinent negatives include no abdominal pain, congestion, coughing, diarrhea, drooling, ear discharge, headaches, hoarse voice, plugged ear sensation, neck pain, shortness of breath, stridor or vomiting. She has had no exposure to strep or mono. She has tried oral narcotic analgesics for the symptoms. The treatment provided mild relief.       Constitution: Negative for chills, sweating, fatigue and fever.   HENT: Positive for ear pain, sore throat and trouble swallowing. Negative for ear discharge, drooling, congestion, sinus pain, sinus pressure and voice change.    Neck: Negative for neck pain and painful lymph nodes.   Eyes: Negative for eye redness.   Respiratory: Negative for chest tightness, cough, sputum production, bloody sputum, COPD, shortness of breath, stridor, wheezing and asthma.    Gastrointestinal: Negative for abdominal pain, nausea, vomiting and diarrhea.   Musculoskeletal: Negative for muscle ache.   Skin: Negative for rash.   Allergic/Immunologic: Negative for seasonal allergies and asthma.   Neurological: Negative for " headaches.   Hematologic/Lymphatic: Negative for swollen lymph nodes.       Objective:      Physical Exam   Constitutional: She is oriented to person, place, and time.     Anxious   HENT:   Head: Normocephalic and atraumatic.   Right Ear: Tympanic membrane, external ear and ear canal normal.   Left Ear: Tympanic membrane, external ear and ear canal normal.   Nose: Nose normal. Right sinus exhibits no maxillary sinus tenderness and no frontal sinus tenderness. Left sinus exhibits no maxillary sinus tenderness and no frontal sinus tenderness.   Mouth/Throat: Mucous membranes are normal.   Left posterior pharynx erythema, no tonsillar abscess appreciated, uvula midline   Neck: Normal range of motion. Neck supple.   Left anterior cervical LN tender to palp   Cardiovascular: Normal rate, regular rhythm and normal heart sounds.   Pulmonary/Chest: Breath sounds normal.   Musculoskeletal: Normal range of motion.   Neurological: She is alert and oriented to person, place, and time.   Skin: Skin is warm and dry.   Psychiatric: She has a normal mood and affect. Her behavior is normal.       Assessment:       1. Acute pharyngitis, unspecified etiology        Plan:         Acute pharyngitis, unspecified etiology  -     X-Ray Neck Soft Tissue; Future; Expected date: 05/02/2019  -     Ambulatory referral to ENT        Greyson Loaj MD  Go to the Emergency Department for any problems  Call your PCP for follow up next available.

## 2019-05-05 ENCOUNTER — TELEPHONE (OUTPATIENT)
Dept: URGENT CARE | Facility: CLINIC | Age: 29
End: 2019-05-05

## 2019-05-06 PROBLEM — J02.9 ACUTE PHARYNGITIS: Status: ACTIVE | Noted: 2019-05-06

## 2019-05-09 ENCOUNTER — OFFICE VISIT (OUTPATIENT)
Dept: OTOLARYNGOLOGY | Facility: CLINIC | Age: 29
End: 2019-05-09
Payer: COMMERCIAL

## 2019-05-09 VITALS
WEIGHT: 127.88 LBS | BODY MASS INDEX: 20.64 KG/M2 | DIASTOLIC BLOOD PRESSURE: 78 MMHG | SYSTOLIC BLOOD PRESSURE: 112 MMHG | HEART RATE: 52 BPM

## 2019-05-09 DIAGNOSIS — H69.93 DYSFUNCTION OF BOTH EUSTACHIAN TUBES: ICD-10-CM

## 2019-05-09 DIAGNOSIS — R09.A2 GLOBUS SENSATION: ICD-10-CM

## 2019-05-09 DIAGNOSIS — J30.2 SEASONAL ALLERGIC RHINITIS, UNSPECIFIED TRIGGER: Primary | ICD-10-CM

## 2019-05-09 PROCEDURE — 99999 PR PBB SHADOW E&M-EST. PATIENT-LVL II: CPT | Mod: PBBFAC,,, | Performed by: NURSE PRACTITIONER

## 2019-05-09 PROCEDURE — 99203 PR OFFICE/OUTPT VISIT, NEW, LEVL III, 30-44 MIN: ICD-10-PCS | Mod: 25,S$GLB,, | Performed by: NURSE PRACTITIONER

## 2019-05-09 PROCEDURE — 99203 OFFICE O/P NEW LOW 30 MIN: CPT | Mod: 25,S$GLB,, | Performed by: NURSE PRACTITIONER

## 2019-05-09 PROCEDURE — 99999 PR PBB SHADOW E&M-EST. PATIENT-LVL II: ICD-10-PCS | Mod: PBBFAC,,, | Performed by: NURSE PRACTITIONER

## 2019-05-09 PROCEDURE — 31575 DIAGNOSTIC LARYNGOSCOPY: CPT | Mod: S$GLB,,, | Performed by: NURSE PRACTITIONER

## 2019-05-09 PROCEDURE — 31575 LARYNGOSCOPY: ICD-10-PCS | Mod: S$GLB,,, | Performed by: NURSE PRACTITIONER

## 2019-05-09 RX ORDER — FLUTICASONE PROPIONATE 50 MCG
2 SPRAY, SUSPENSION (ML) NASAL DAILY
Qty: 1 BOTTLE | Refills: 5 | Status: SHIPPED | OUTPATIENT
Start: 2019-05-09 | End: 2019-06-08

## 2019-05-09 NOTE — PROCEDURES
"Laryngoscopy  Date/Time: 5/9/2019 8:42 AM  Performed by: Sudhakar Deutsch NP  Authorized by: Sudhakar Deutsch NP     Time out: Immediately prior to procedure a "time out" was called to verify the correct patient, procedure, equipment, support staff and site/side marked as required.    Consent Done?:  Yes (Verbal)  Anesthesia:     Local anesthetic:  Lidocaine 4% and Speedy-Synephrine 1/2%    Patient tolerance:  Patient tolerated the procedure well with no immediate complications  Laryngoscopy:     Areas examined:  Nasal cavities, nasopharynx, oropharynx, hypopharynx, larynx and vocal cords    Laryngoscope size:  4 mm  Nose Intranasal:      Mucosa no polyps     Mucosa ulcers not present     No mucosa lesions present     No septum gross deformity     Enlarged turbinates  Nasopharynx:      No mucosa lesions     Adenoids not present     Posterior choanae not patent     Eustachian tube patent  Larynx/hypopharynx:      No epiglottis lesions     No epiglottis edema     No AE folds lesions     No vocal cord polyps     Not equal and normal bilateral     No hypopharynx lesions     No piriform sinus pooling     No piriform sinus lesions     No post cricoid edema     No post cricoid erythema     Mucosal edema  Mucoid mucous flow throughout nasal cavity to posterior nasoharynx  Erythematous posterior nasopharynx with cobblestoning.  Slight middle turbinate hypertrophy  Eustachian tube edema bilaterally  No purulence or polyps.  No masses.  Good Vocal cord mobility.      "

## 2019-05-09 NOTE — LETTER
May 9, 2019      Greyson Loja MD  [address]  [place] [address]           [facility] - Otorhinolaryngology  [address]  [facility] [address]  Phone: [phone]  Fax: [phone]          Patient: Haley Henriquez   MR Number: [id number]   YOB: 1990   Date of Visit: 5/9/2019       Dear Dr. Greyson Loja:    Thank you for referring Haley Henriquez to me for evaluation. Attached you will find relevant portions of my assessment and plan of care.    If you have questions, please do not hesitate to call me. I look forward to following Haley Henriquez along with you.    Sincerely,    Sudhakar Deutsch, NP    Enclosure  CC:  No Recipients    If you would like to receive this communication electronically, please contact [email or web address] or [phone] to request more information on [employer] access.    For providers and/or their staff who would like to refer a patient to [facility], please contact us through our one-stop-shop provider referral line, [facility], at [phone].    If you feel you have received this communication in error or would no longer like to receive these types of communications, please e-mail [email or web address]

## 2019-05-09 NOTE — PROGRESS NOTES
Subjective:      Haley Henriquez is a 29 y.o. female who was referred to me by Dr. Greyson Loja in consultation for aural fullness.    Patient reports a 2 weeks history of bilateral ear pressure and globus sensation. Associated symptoms includes frontal sinus pressure, post-nasal drip, hyposmia, headache, productive cough, itchy eyes and nose. She denies nasal blockage, fever, fatigue, vision change, hearing change, halitosis or tooth pain. She is currently taking amoxicillin prescrbie by another provider with limited benefit, still has three days left of dose and has had one steroid injection. She has intermittently tried an antihistamine but unable to remember it's name. She reports having similar symptoms each year around the same time (winter and spring). She had also flown recently to [place] and had significant ear pain with the flight.     Current sinonasal medications as above.  She does not regularly use nasal decongestant sprays.    She does not recall previously having allergy testing.    She relates a history of asthma which is currently managed with albuterol and simbicort.    She denies a history of reflux symptoms.  She has not previously had an EGD.    She denies have a diagnosis of obstructive sleep apnea.     She has not had sinonasal surgery.    She does not recall a prior history of nasal trauma.      Past Medical History  She has a past medical history of Anxiety, Asthma, BRCA2 positive, Depression, Headache, psychiatric care, Panic disorder, Psychiatric problem, and Therapy.    Past Surgical History  She has a past surgical history that includes Colposcopy and Knee surgery.    Family History  Her family history includes Anxiety disorder in her brother, father, maternal grandfather, maternal grandmother, paternal grandfather, and paternal grandmother; BRCA 1/2 in her mother; Breast cancer in her maternal aunt; Depression in her brother, father, maternal grandfather, maternal  grandmother, paternal grandfather, and paternal grandmother; Ovarian cancer in her maternal grandmother.    Social History  She reports that she has never smoked. She has never used smokeless tobacco. She reports that she drinks alcohol. She reports that she has current or past drug history. Drug: Marijuana. Frequency: 1.00 time per week.    Allergies  She has No Known Allergies.    Medications   She has a current medication list which includes the following prescription(s): diphenhydramine hcl, albuterol, amoxicillin-clavulanate 875-125mg, amoxicillin-clavulanate 875-125mg, budesonide-formoterol 80-4.5 mcg, cetirizine, fluoxetine, folic acid, and marlissa (28).    Review of Systems  Review of Systems   Constitutional: Negative for chills, fatigue and fever.   HENT: Positive for congestion, postnasal drip, rhinorrhea and sinus pressure. Negative for facial swelling, hearing loss, nosebleeds, sinus pain, sneezing, sore throat and tinnitus.    Eyes: Positive for itching. Negative for photophobia, redness and visual disturbance.   Respiratory: Positive for cough. Negative for apnea, shortness of breath, wheezing and stridor.    Cardiovascular: Negative for chest pain and palpitations.   Gastrointestinal: Negative for diarrhea, nausea and vomiting.   Endocrine: Negative.    Genitourinary: Negative for decreased urine volume, dysuria and frequency.   Musculoskeletal: Negative for arthralgias, myalgias and neck stiffness.   Skin: Negative for rash and wound.   Allergic/Immunologic: Positive for environmental allergies. Negative for food allergies and immunocompromised state.   Neurological: Positive for headaches. Negative for dizziness, syncope, weakness and light-headedness.   Hematological: Positive for adenopathy. Does not bruise/bleed easily.   Psychiatric/Behavioral: Negative for confusion, decreased concentration and sleep disturbance.          Objective:     /78 (BP Location: Right arm, Patient Position:  Sitting, BP Method: Small (Automatic))   Pulse (!) 52   Wt 58 kg (127 lb 13.9 oz)   LMP 04/20/2019   BMI 20.64 kg/m²        Constitutional:   She is oriented to person, place, and time. Vital signs are normal. She appears well-developed and well-nourished. She appears alert. Normal speech.      Head:  Normocephalic and atraumatic.     Ears:    Right Ear: No lacerations. No drainage, swelling or tenderness. No foreign bodies. No mastoid tenderness. Tympanic membrane is retracted. Tympanic membrane is not injected, not scarred, not perforated, not erythematous and not bulging. Tympanic membrane mobility is normal. No middle ear effusion. No hemotympanum.   Left Ear: No lacerations. No drainage, swelling or tenderness. No foreign bodies. No mastoid tenderness. Tympanic membrane is retracted. Tympanic membrane is not injected, not scarred, not perforated, not erythematous and not bulging. Tympanic membrane mobility is normal.  No middle ear effusion. No hemotympanum.     Nose:  Mucosal edema and rhinorrhea present. No nose lacerations, sinus tenderness, septal deviation, nasal septal hematoma or polyps. No epistaxis.  No foreign bodies. Right sinus exhibits frontal sinus tenderness. Right sinus exhibits no maxillary sinus tenderness. Left sinus exhibits frontal sinus tenderness. Left sinus exhibits no maxillary sinus tenderness.   Tonsils 1+ bilaterally    Mouth/Throat  Oropharynx not clear and moist, abnormal uvula midline and lips, teeth, and gums normal. No uvula swelling, oral lesions, trismus, mucous membrane lesions or xerostomia. No oropharyngeal exudate, posterior oropharyngeal edema or posterior oropharyngeal erythema.     Neck:  Neck normal without thyromegaly masses, asymmetry, normal tracheal structure, crepitus, and tenderness and no adenopathy.     Cardiovascular:   Normal heart sounds and normal pulses.      Pulmonary/Chest:   Effort normal and breath sounds normal.     Psychiatric:   She has a normal  mood and affect. Her speech is normal and behavior is normal.     Neurological:   She is alert and oriented to person, place, and time.       Procedure    Flexible laryngoscopy performed.  See procedure note.      Data Reviewed    No results found for: WBC  No results found for: EOSINOPHIL  No results found for: EOS  No results found for: PLT  No results found for: GLU  No results found for: IGE    Soft tissue neck X-ray (5/2/19):  Impression       1. No acute soft tissue abnormality radiographically.            Assessment:     1. Seasonal allergic rhinitis, unspecified trigger    2. Dysfunction of both eustachian tubes    3. Globus sensation         Plan:     I had a long discussion with the patient regarding her condition and the further workup and management options.    This patient has symptoms most consistent with allergic rhinitis. Most likely contributing to her ear pressure and globus sensation. Previous xray was negative for pertinent findings. She has the symptoms yearly.   I ordered fluticasone daily for rhinitis. I recommended Clariton daily and nasal saline rinses daily.  Follow up if not better in a month. Will consider referral to allergy.

## 2019-05-22 ENCOUNTER — OFFICE VISIT (OUTPATIENT)
Dept: PSYCHIATRY | Facility: CLINIC | Age: 29
End: 2019-05-22
Payer: COMMERCIAL

## 2019-05-22 VITALS
DIASTOLIC BLOOD PRESSURE: 65 MMHG | HEART RATE: 70 BPM | SYSTOLIC BLOOD PRESSURE: 109 MMHG | BODY MASS INDEX: 20.07 KG/M2 | WEIGHT: 124.88 LBS | HEIGHT: 66 IN

## 2019-05-22 DIAGNOSIS — F41.0 PANIC ATTACKS: ICD-10-CM

## 2019-05-22 DIAGNOSIS — F41.1 GAD (GENERALIZED ANXIETY DISORDER): ICD-10-CM

## 2019-05-22 DIAGNOSIS — F32.A DEPRESSION, UNSPECIFIED DEPRESSION TYPE: Primary | ICD-10-CM

## 2019-05-22 PROCEDURE — 99999 PR PBB SHADOW E&M-EST. PATIENT-LVL II: CPT | Mod: PBBFAC,,, | Performed by: PSYCHIATRY & NEUROLOGY

## 2019-05-22 PROCEDURE — 99212 OFFICE O/P EST SF 10 MIN: CPT | Mod: S$GLB,,, | Performed by: PSYCHIATRY & NEUROLOGY

## 2019-05-22 PROCEDURE — 99212 PR OFFICE/OUTPT VISIT, EST, LEVL II, 10-19 MIN: ICD-10-PCS | Mod: S$GLB,,, | Performed by: PSYCHIATRY & NEUROLOGY

## 2019-05-22 PROCEDURE — 99999 PR PBB SHADOW E&M-EST. PATIENT-LVL II: ICD-10-PCS | Mod: PBBFAC,,, | Performed by: PSYCHIATRY & NEUROLOGY

## 2019-05-22 RX ORDER — FLUOXETINE HYDROCHLORIDE 40 MG/1
80 CAPSULE ORAL DAILY
Qty: 60 CAPSULE | Refills: 3 | Status: SHIPPED | OUTPATIENT
Start: 2019-05-22 | End: 2021-01-14 | Stop reason: ALTCHOICE

## 2019-05-22 RX ORDER — FOLIC ACID 1 MG/1
2 TABLET ORAL DAILY
Qty: 60 TABLET | Refills: 3 | Status: SHIPPED | OUTPATIENT
Start: 2019-05-22 | End: 2019-12-19

## 2019-05-22 NOTE — PROGRESS NOTES
Staff Note:     Case discussed.  I agree with Resident's findings and treatment plan.  Consider addition of Geodon if Prozac alone is not adequate.

## 2019-05-22 NOTE — PROGRESS NOTES
Outpatient Psychiatry Follow Up Visit    5/22/2019    Haley Henriquez, a 29 y.o. female, presenting for depression, anxiety and skin picking on head.    History of Present Illness:  Patient has not been feeling well. Upon increase of Prozac, she had nausea. That improved, but then she had a throat virus followed by an URI. Now, she is concerned that she is having headaches due to sinus issues. Hair picking is about the same. Does not want to go up on Prozac at this time as she is still not feeling well. Went to one couples counseling session and is setting up individual therapy outside of the system. Relationships with dad and boyfriend are still very nimesh. Eating and sleeping ok. Denies any SI/HI or any current plan/intent to self-harm or harm others. No vocalized delusions. No signs/symptoms of akila.    History reviewed and updated below as necessary:  Psychiatric History:  Prior psych meds: Vyvanse for ADHD for 1.5 years ago, on Ambien for a couple years, Lexapro, Celexa, Cymbalta, Klonopin  Prior psych hospitalizations: 1x-senior year of college for SI  Prior outpatient psychiatrist: not for a long time  Previous suicide attempts: denies  Hx of violence: denies  Access to a gun: denies     Social:  Origin: [place], [place]; 2.5 years ago, moved here for boyfriend  Marital Status: never   Children: no kids  Other Support: boyfriend  Housing Status: with boyfriend, [facility]  Employment Info:  for [employer]  Education: has her CPA  Special Ed: denies; needed help with reading comprehension     Family:  Family Psych Hx: both grandmothers-anxiety and depression, suicide attemp; both parents-anxiety and depression but not actively seeking help  Hx of phys/sexual abuse: denies     Legal:  Past charges/incarcerations: denies  Pending charges: denies     Substance Use:  Rec Drugs: marijuana-2x per month; cocaine-1 month ago  Use of EtOH: 2 glasses of wine 3-4 nights per week  Tobacco:  "denies  Rehab Hx: denies     PMHx  Slight asthma  BRCA2 gene  Seizures: denies  Head trauma/TBI: denies  Allergies: NKDA  Current meds:  -OCPs  -OTC allergy meds    Review Of Systems:     ROS  General ROS: negative for - chills or fever  Respiratory ROS: no cough, shortness of breath, or wheezing  Cardiovascular ROS: no chest pain or dyspnea on exertion  Gastrointestinal ROS: no abdominal pain, change in bowel habits, or black or bloody stools  Neurological ROS: negative for - headaches or weakness    Current Evaluation:     Nutritional Screening: Considering the patient's height and weight, medications, medical history and preferences, should a referral be made to the dietitian? no    Constitutional  Vitals:  Most recent vital signs, dated less than 90 days prior to this appointment, were reviewed.    Vitals:    05/22/19 0847   BP: 109/65   Pulse: 70   Weight: 56.7 kg (124 lb 14.3 oz)   Height: 5' 6" (1.676 m)        General:  unremarkable, age appropriate     Musculoskeletal  Muscle Strength/Tone:  not examined   Gait & Station:  non-ataxic     Psychiatric  Speech:  no latency; no press   Mood & Affect:  not good  congruent and appropriate   Thought Process:  normal and logical   Associations:  intact   Thought Content:  normal, no suicidality, no homicidality, delusions, or paranoia   Insight:  intact   Judgement: behavior is adequate to circumstances   Orientation:  grossly intact   Memory: intact for content of interview   Language: grossly intact   Attention Span & Concentration:  able to focus   Fund of Knowledge:  intact and appropriate to age and level of education       Relevant Elements of Neurological Exam: normal gait    Laboratory Data  Office Visit on 04/30/2019   Component Date Value Ref Range Status    Rapid Strep A Screen 04/30/2019 Negative  Negative Final     Acceptable 04/30/2019 Yes   Final    Monospot 04/30/2019 Negative  Negative Final     Acceptable " 04/30/2019 Yes   Final    Strep A Culture 04/30/2019 No  Group A  Streptococcus isolated   Final         Medications  Outpatient Encounter Medications as of 5/22/2019   Medication Sig Dispense Refill    (Magic mouthwash) 1:1:1 Benadryl 12.5mg/5ml liq, aluminum & magnesium hydroxide-simehticone (Maalox), lidocaine viscous 2% Swish and spit 10 mLs every 4 (four) hours as needed. 180 mL 0    albuterol 90 mcg/actuation inhaler Inhale 2 puffs into the lungs every 6 (six) hours as needed for Wheezing. Rescue      amoxicillin-clavulanate 875-125mg (AUGMENTIN) 875-125 mg per tablet Take 1 tablet by mouth every 12 (twelve) hours. 20 tablet 0    budesonide-formoterol 80-4.5 mcg (SYMBICORT) 80-4.5 mcg/actuation HFAA Inhale 2 puffs into the lungs 2 (two) times daily. Controller 10.2 g 2    cetirizine (ZYRTEC) 10 MG tablet Take 10 mg by mouth once daily.      FLUoxetine 40 MG capsule Take 2 capsules (80 mg total) by mouth once daily. 60 capsule 3    fluticasone propionate (FLONASE) 50 mcg/actuation nasal spray 2 sprays (100 mcg total) by Each Nare route once daily. 1 Bottle 5    folic acid (FOLVITE) 1 MG tablet Take 2 tablets (2 mg total) by mouth once daily. 60 tablet 3    MARLISSA 0.15-0.03 mg per tablet Take 1 tablet by mouth once daily. 84 tablet 3    [DISCONTINUED] FLUoxetine 40 MG capsule Take 2 capsules (80 mg total) by mouth once daily. 60 capsule 1    [DISCONTINUED] folic acid (FOLVITE) 1 MG tablet Take 2 tablets (2 mg total) by mouth once daily. 60 tablet 1     No facility-administered encounter medications on file as of 5/22/2019.            Assessment - Diagnosis - Goals:     Impression:  Major depressive disorder  Generalized anxiety disorder  R/O OCD    Treatment Plan/Recommendations:   -Continue Prozac 80mg daily  -Continue folate 1mg BID    If hair picking does not improve over next three months with increased therapy, would recommend increasing to Prozac 120mg to attempt to treat this  symptom.    Resident transition discussed. Patient will call to make an appointment the first week of June.    Discussed diagnosis, risks and benefits of proposed treatment vs alternative treatments vs no treatment, potential side effects of these treatments specifically for Prozac including but not limited to decreased appetite, dry mouth, sweating, nausea, constipation, sexual side effects in men or women, yawn, tremor, sleepiness, increased suicidal ideation and serotonin syndrome.    Return to Clinic: 3 months    Allyson Darga, MD [facility] Psychiatry, PGY-3

## 2019-08-15 ENCOUNTER — OFFICE VISIT (OUTPATIENT)
Dept: OTOLARYNGOLOGY | Facility: CLINIC | Age: 29
End: 2019-08-15
Payer: COMMERCIAL

## 2019-08-15 VITALS
SYSTOLIC BLOOD PRESSURE: 113 MMHG | DIASTOLIC BLOOD PRESSURE: 70 MMHG | BODY MASS INDEX: 19.85 KG/M2 | WEIGHT: 123 LBS | HEART RATE: 61 BPM

## 2019-08-15 DIAGNOSIS — J30.2 SEASONAL ALLERGIC RHINITIS, UNSPECIFIED TRIGGER: Primary | ICD-10-CM

## 2019-08-15 DIAGNOSIS — J31.0 CHRONIC RHINITIS: ICD-10-CM

## 2019-08-15 PROCEDURE — 99213 OFFICE O/P EST LOW 20 MIN: CPT | Mod: S$GLB,,, | Performed by: NURSE PRACTITIONER

## 2019-08-15 PROCEDURE — 99213 PR OFFICE/OUTPT VISIT, EST, LEVL III, 20-29 MIN: ICD-10-PCS | Mod: S$GLB,,, | Performed by: NURSE PRACTITIONER

## 2019-08-15 PROCEDURE — 99999 PR PBB SHADOW E&M-EST. PATIENT-LVL III: CPT | Mod: PBBFAC,,, | Performed by: NURSE PRACTITIONER

## 2019-08-15 PROCEDURE — 99999 PR PBB SHADOW E&M-EST. PATIENT-LVL III: ICD-10-PCS | Mod: PBBFAC,,, | Performed by: NURSE PRACTITIONER

## 2019-08-15 NOTE — PROGRESS NOTES
Subjective:      Haley is a 29 y.o. female who comes for follow-up of rhinitis.  Her last visit with me was on 5/9/2019. Ms. Henriquez reports improvement of symptoms with fluticasone and Claritin, but she feels she would like to get better relief of rhinorrhea and frontal sinus headaches. Nasal congestion is much improved. 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    HPI (5/9/19):  Haley Henriquez is a 29 y.o. female who was referred to me by Dr. Greyson Loja in consultation for aural fullness. Patient reports a 2 weeks history of bilateral ear pressure and globus sensation. Associated symptoms includes frontal sinus pressure, post-nasal drip, hyposmia, headache, productive cough, itchy eyes and nose. She denies nasal blockage, fever, fatigue, vision change, hearing change, halitosis or tooth pain. She is currently taking amoxicillin prescrbie by another provider with limited benefit, still has three days left of dose and has had one steroid injection. She has intermittently tried an antihistamine but unable to remember it's name. She reports having similar symptoms each year around the same time (winter and spring). She had also flown recently to [place] and had significant ear pain with the flight. Current sinonasal medications as above.  She does not regularly use nasal decongestant sprays. She does not recall previously having allergy testing. She relates a history of asthma which is currently managed with albuterol and simbicort. She denies a history of reflux symptoms.  She has not previously had an EGD. She denies have a diagnosis of obstructive sleep apnea. She has not had sinonasal surgery. She does not recall a prior history of nasal trauma.    The patient's medications, allergies, past medical, surgical, social and family histories were reviewed and updated as appropriate.    A detailed review of systems was obtained with pertinent positives as per the above HPI, and otherwise  negative.        Objective:     /70   Pulse 61   Wt 55.8 kg (123 lb)   BMI 19.85 kg/m²        Constitutional:   She is oriented to person, place, and time. Vital signs are normal. She appears well-developed and well-nourished. She appears alert.     Head:  Normocephalic and atraumatic.     Ears:    Right Ear: No lacerations. No drainage, swelling or tenderness. No foreign bodies. No mastoid tenderness. Tympanic membrane is not injected, not scarred, not perforated, not erythematous, not retracted and not bulging. Tympanic membrane mobility is normal. No middle ear effusion. No hemotympanum.   Left Ear: No lacerations. No drainage, swelling or tenderness. No foreign bodies. No mastoid tenderness. Tympanic membrane is not injected, not scarred, not perforated, not erythematous, not retracted and not bulging. Tympanic membrane mobility is normal.  No middle ear effusion. No hemotympanum.     Nose:  Mucosal edema and rhinorrhea present. No nose lacerations, sinus tenderness, septal deviation, nasal septal hematoma or polyps. No epistaxis.  No foreign bodies. Right sinus exhibits no maxillary sinus tenderness and no frontal sinus tenderness. Left sinus exhibits no maxillary sinus tenderness and no frontal sinus tenderness.     Mouth/Throat  Oropharynx clear and moist without lesions or asymmetry, normal uvula midline and lips, teeth, and gums normal. No uvula swelling, oral lesions, trismus, mucous membrane lesions or xerostomia. No oropharyngeal exudate, posterior oropharyngeal edema or posterior oropharyngeal erythema.     Neck:  Neck normal without thyromegaly masses, asymmetry, normal tracheal structure, crepitus, and tenderness and no adenopathy.     Neurological:   She is alert and oriented to person, place, and time. No cranial nerve deficit.       Procedure    None      Data Reviewed    No results found for: WBC  No results found for: EOSINOPHIL  No results found for: EOS  No results found for: PLT  No  results found for: GLU  No results found for: IGE        Assessment:     1. Seasonal allergic rhinitis, unspecified trigger    2. Chronic rhinitis         Plan:     Continue fluticasone - two spray each nostril once daily.  I ordered azelastine - one spray each nostril twice daily.  Stop Claritin.  If not improved will consider referral to allergy.    Follow up in about 6 months (around 2/15/2020).

## 2019-09-03 ENCOUNTER — PATIENT MESSAGE (OUTPATIENT)
Dept: OTOLARYNGOLOGY | Facility: CLINIC | Age: 29
End: 2019-09-03

## 2019-09-03 DIAGNOSIS — J30.2 SEASONAL ALLERGIC RHINITIS, UNSPECIFIED TRIGGER: Primary | ICD-10-CM

## 2019-09-04 RX ORDER — AZELASTINE 1 MG/ML
1 SPRAY, METERED NASAL 2 TIMES DAILY
Qty: 30 ML | Refills: 3 | Status: SHIPPED | OUTPATIENT
Start: 2019-09-04 | End: 2021-04-28

## 2019-09-04 RX ORDER — FLUTICASONE PROPIONATE 50 MCG
2 SPRAY, SUSPENSION (ML) NASAL DAILY
Qty: 1 BOTTLE | Refills: 5 | Status: SHIPPED | OUTPATIENT
Start: 2019-09-04 | End: 2019-10-04

## 2019-10-23 ENCOUNTER — TELEPHONE (OUTPATIENT)
Dept: OBSTETRICS AND GYNECOLOGY | Facility: CLINIC | Age: 29
End: 2019-10-23

## 2019-10-23 NOTE — TELEPHONE ENCOUNTER
----- Message from Gretchen Fisher sent at 10/23/2019  2:53 PM CDT -----  Contact: LISA YANES [[id number]]   Name of Who is Calling:     What is the request in detail:  Patient request call back in reference to appointment Please contact to further discuss and advise      Can the clinic reply by [facility]:no     What Number to Call Back if not in [facility]:

## 2019-10-24 ENCOUNTER — TELEPHONE (OUTPATIENT)
Dept: OBSTETRICS AND GYNECOLOGY | Facility: CLINIC | Age: 29
End: 2019-10-24

## 2019-10-24 NOTE — TELEPHONE ENCOUNTER
Returned call to the patient. Patient agreed to a appointment on 12/12 at 10:45AM. Patient verbalized understanding.

## 2019-10-24 NOTE — TELEPHONE ENCOUNTER
----- Message from Neal Torres sent at 10/24/2019  1:26 PM CDT -----  Please call pt she needs to schedule her annual appt  [phone]

## 2019-12-09 ENCOUNTER — PATIENT MESSAGE (OUTPATIENT)
Dept: OTOLARYNGOLOGY | Facility: CLINIC | Age: 29
End: 2019-12-09

## 2019-12-09 DIAGNOSIS — J31.0 CHRONIC RHINITIS: Primary | ICD-10-CM

## 2019-12-09 DIAGNOSIS — J30.2 SEASONAL ALLERGIC RHINITIS, UNSPECIFIED TRIGGER: ICD-10-CM

## 2019-12-16 ENCOUNTER — OFFICE VISIT (OUTPATIENT)
Dept: ALLERGY | Facility: CLINIC | Age: 29
End: 2019-12-16
Payer: COMMERCIAL

## 2019-12-16 ENCOUNTER — HOSPITAL ENCOUNTER (OUTPATIENT)
Dept: PULMONOLOGY | Facility: CLINIC | Age: 29
Discharge: HOME | End: 2019-12-16
Payer: COMMERCIAL

## 2019-12-16 VITALS
HEIGHT: 66 IN | DIASTOLIC BLOOD PRESSURE: 60 MMHG | HEART RATE: 74 BPM | WEIGHT: 128.31 LBS | OXYGEN SATURATION: 99 % | BODY MASS INDEX: 20.62 KG/M2 | SYSTOLIC BLOOD PRESSURE: 120 MMHG

## 2019-12-16 DIAGNOSIS — J45.41 MODERATE PERSISTENT ASTHMA WITH ACUTE EXACERBATION: ICD-10-CM

## 2019-12-16 DIAGNOSIS — J45.41 MODERATE PERSISTENT ASTHMA WITH ACUTE EXACERBATION: Primary | ICD-10-CM

## 2019-12-16 DIAGNOSIS — J31.0 CHRONIC RHINITIS: ICD-10-CM

## 2019-12-16 DIAGNOSIS — Z23 NEED FOR INFLUENZA VACCINATION: ICD-10-CM

## 2019-12-16 LAB
DLCO ADJ PRE: 22.29 ML/(MIN*MMHG) (ref 22.58–34.05)
DLCO SINGLE BREATH LLN: 22.58
DLCO SINGLE BREATH PRE REF: 78.7 %
DLCO SINGLE BREATH REF: 28.32
DLCOC SBVA LLN: 3.87
DLCOC SBVA PRE REF: 82.5 %
DLCOC SBVA REF: 5.34
DLCOC SINGLE BREATH LLN: 22.58
DLCOC SINGLE BREATH PRE REF: 78.7 %
DLCOC SINGLE BREATH REF: 28.32
DLCOCSBVAULN: 6.82
DLCOCSINGLEBREATHULN: 34.05
DLCOSINGLEBREATHULN: 34.05
DLCOVA LLN: 3.87
DLCOVA PRE REF: 82.5 %
DLCOVA PRE: 4.41 ML/(MIN*MMHG*L) (ref 3.87–6.82)
DLCOVA REF: 5.34
DLCOVAULN: 6.82
DLVAADJ PRE: 4.41 ML/(MIN*MMHG*L) (ref 3.87–6.82)
ERVN2 LLN: 1.26
ERVN2 PRE REF: 96.9 %
ERVN2 PRE: 1.22 L (ref 1.26–1.26)
ERVN2 REF: 1.26
ERVN2ULN: 1.26
FEF 25 75 LLN: 2.35
FEF 25 75 PRE REF: 115 %
FEF 25 75 REF: 3.68
FET100 CHG: -23.2 %
FEV05 LLN: 1.5
FEV05 REF: 2.35
FEV1 CHG: 3.6 %
FEV1 FVC LLN: 73
FEV1 FVC PRE REF: 108.7 %
FEV1 FVC REF: 84
FEV1 LLN: 2.74
FEV1 PRE REF: 93.9 %
FEV1 REF: 3.42
FEV1 VOL CHG: 0.12
FRCN2 LLN: 1.97
FRCN2 PRE REF: 82.9 %
FRCN2 REF: 2.79
FRCN2ULN: 3.62
FVC CHG: 2.4 %
FVC LLN: 3.26
FVC PRE REF: 85.9 %
FVC REF: 4.08
FVC VOL CHG: 0.08
IVC PRE: 3.21 L (ref 3.26–4.9)
IVC SINGLE BREATH LLN: 3.26
IVC SINGLE BREATH PRE REF: 78.6 %
IVC SINGLE BREATH REF: 4.08
IVCSINGLEBREATHULN: 4.9
PEF LLN: 5.51
PEF PRE REF: 101.2 %
PEF REF: 7.34
PHYSICIAN COMMENT: ABNORMAL
POST FEF 25 75: 4.95 L/S (ref 2.35–5)
POST FET 100: 4.27 SEC
POST FEV1 FVC: 92.65 % (ref 72.8–95.64)
POST FEV1: 3.33 L (ref 2.74–4.09)
POST FEV5: 2.76 L (ref 1.5–3.21)
POST FVC: 3.59 L (ref 3.26–4.9)
POST PEF: 8.69 L/S (ref 5.51–9.17)
PRE DLCO: 22.29 ML/(MIN*MMHG) (ref 22.58–34.05)
PRE FEF 25 75: 4.23 L/S (ref 2.35–5)
PRE FET 100: 5.56 SEC
PRE FEV05 REF: 108.5 %
PRE FEV1 FVC: 91.53 % (ref 72.8–95.64)
PRE FEV1: 3.21 L (ref 2.74–4.09)
PRE FEV5: 2.55 L (ref 1.5–3.21)
PRE FRC N2: 2.32 L
PRE FVC: 3.51 L (ref 3.26–4.9)
PRE PEF: 7.43 L/S (ref 5.51–9.17)
RVN2 LLN: 0.96
RVN2 PRE REF: 61.9 %
RVN2 PRE: 0.95 L (ref 0.96–2.11)
RVN2 REF: 1.54
RVN2TLCN2 LLN: 19.91
RVN2TLCN2 PRE REF: 72.3 %
RVN2TLCN2 PRE: 21.34 % (ref 19.91–39.09)
RVN2TLCN2 REF: 29.5
RVN2TLCN2ULN: 39.09
RVN2ULN: 2.11
TLCN2 LLN: 4.31
TLCN2 PRE REF: 84.1 %
TLCN2 PRE: 4.46 L (ref 4.31–6.29)
TLCN2 REF: 5.3
TLCN2ULN: 6.29
VA PRE: 5.06 L (ref 5.15–5.15)
VA SINGLE BREATH LLN: 5.15
VA SINGLE BREATH PRE REF: 98.3 %
VA SINGLE BREATH REF: 5.15
VASINGLEBREATHULN: 5.15
VCMAXN2 LLN: 3.26
VCMAXN2 PRE REF: 85.9 %
VCMAXN2 PRE: 3.51 L (ref 3.26–4.9)
VCMAXN2 REF: 4.08
VCMAXN2ULN: 4.9

## 2019-12-16 PROCEDURE — 94060 EVALUATION OF WHEEZING: CPT | Mod: S$GLB,,, | Performed by: INTERNAL MEDICINE

## 2019-12-16 PROCEDURE — 90471 FLU VACCINE (QUAD) GREATER THAN OR EQUAL TO 3YO PRESERVATIVE FREE IM: ICD-10-PCS | Mod: S$GLB,,, | Performed by: STUDENT IN AN ORGANIZED HEALTH CARE EDUCATION/TRAINING PROGRAM

## 2019-12-16 PROCEDURE — 90686 FLU VACCINE (QUAD) GREATER THAN OR EQUAL TO 3YO PRESERVATIVE FREE IM: ICD-10-PCS | Mod: S$GLB,,, | Performed by: STUDENT IN AN ORGANIZED HEALTH CARE EDUCATION/TRAINING PROGRAM

## 2019-12-16 PROCEDURE — 94729 PR C02/MEMBANE DIFFUSE CAPACITY: ICD-10-PCS | Mod: S$GLB,,, | Performed by: INTERNAL MEDICINE

## 2019-12-16 PROCEDURE — 99999 PR PBB SHADOW E&M-EST. PATIENT-LVL III: CPT | Mod: PBBFAC,,, | Performed by: STUDENT IN AN ORGANIZED HEALTH CARE EDUCATION/TRAINING PROGRAM

## 2019-12-16 PROCEDURE — 90471 IMMUNIZATION ADMIN: CPT | Mod: S$GLB,,, | Performed by: STUDENT IN AN ORGANIZED HEALTH CARE EDUCATION/TRAINING PROGRAM

## 2019-12-16 PROCEDURE — 94729 DIFFUSING CAPACITY: CPT | Mod: S$GLB,,, | Performed by: INTERNAL MEDICINE

## 2019-12-16 PROCEDURE — 99204 OFFICE O/P NEW MOD 45 MIN: CPT | Mod: 25,S$GLB,, | Performed by: STUDENT IN AN ORGANIZED HEALTH CARE EDUCATION/TRAINING PROGRAM

## 2019-12-16 PROCEDURE — 99999 PR PBB SHADOW E&M-EST. PATIENT-LVL III: ICD-10-PCS | Mod: PBBFAC,,, | Performed by: STUDENT IN AN ORGANIZED HEALTH CARE EDUCATION/TRAINING PROGRAM

## 2019-12-16 PROCEDURE — 94727 GAS DIL/WSHOT DETER LNG VOL: CPT | Mod: S$GLB,,, | Performed by: INTERNAL MEDICINE

## 2019-12-16 PROCEDURE — 94060 PR EVAL OF BRONCHOSPASM: ICD-10-PCS | Mod: S$GLB,,, | Performed by: INTERNAL MEDICINE

## 2019-12-16 PROCEDURE — 94727 PR PULM FUNCTION TEST BY GAS: ICD-10-PCS | Mod: S$GLB,,, | Performed by: INTERNAL MEDICINE

## 2019-12-16 PROCEDURE — 90686 IIV4 VACC NO PRSV 0.5 ML IM: CPT | Mod: S$GLB,,, | Performed by: STUDENT IN AN ORGANIZED HEALTH CARE EDUCATION/TRAINING PROGRAM

## 2019-12-16 PROCEDURE — 99204 PR OFFICE/OUTPT VISIT, NEW, LEVL IV, 45-59 MIN: ICD-10-PCS | Mod: 25,S$GLB,, | Performed by: STUDENT IN AN ORGANIZED HEALTH CARE EDUCATION/TRAINING PROGRAM

## 2019-12-16 RX ORDER — ALBUTEROL SULFATE 90 UG/1
2 AEROSOL, METERED RESPIRATORY (INHALATION) EVERY 6 HOURS PRN
Qty: 18 G | Refills: 3 | Status: SHIPPED | OUTPATIENT
Start: 2019-12-16 | End: 2021-03-25 | Stop reason: SDUPTHER

## 2019-12-16 RX ORDER — FLUTICASONE PROPIONATE 50 MCG
2 SPRAY, SUSPENSION (ML) NASAL 2 TIMES DAILY
Qty: 2 BOTTLE | Refills: 5 | Status: SHIPPED | OUTPATIENT
Start: 2019-12-16 | End: 2021-03-25 | Stop reason: SDUPTHER

## 2019-12-16 RX ORDER — ALBUTEROL SULFATE 1.25 MG/3ML
1.25 SOLUTION RESPIRATORY (INHALATION) EVERY 6 HOURS PRN
Qty: 60 VIAL | Refills: 1 | Status: SHIPPED | OUTPATIENT
Start: 2019-12-16 | End: 2020-12-15

## 2019-12-16 RX ORDER — PREDNISONE 20 MG/1
TABLET ORAL
Qty: 18 TABLET | Refills: 0 | Status: SHIPPED | OUTPATIENT
Start: 2019-12-16 | End: 2021-01-14 | Stop reason: ALTCHOICE

## 2019-12-16 RX ORDER — FLUTICASONE PROPIONATE AND SALMETEROL 500; 50 UG/1; UG/1
1 POWDER RESPIRATORY (INHALATION) 2 TIMES DAILY
Qty: 60 EACH | Refills: 11 | Status: SHIPPED | OUTPATIENT
Start: 2019-12-16 | End: 2021-03-25 | Stop reason: DRUGHIGH

## 2019-12-16 NOTE — PROGRESS NOTES
"Allergy Clinic Note  [facility]    Subjective:      Patient ID: Haley Henriquez is a 29 y.o. female.    Chief Complaint: Other (nasal congestion since Apr 19, gets "bronchial" every year, head pressure)      Referring Provider: Sudhakar Deutsch    History of Present Illness:  29-year-old female referred from Dermatology for longstanding chest and head symptoms.    Related meds  Symbicort in winter  Albuterol  Astelin    Patient reports a history of seasonal chest symptoms for the last 7-8 years.  On closer questioning she says has been using albuterol at least once a day throughout the year for the last 7 day yesrs.  Symptoms have been worse in winter and she usually takes a controller inhaler, either Advair or more recently Symbicort.    Her current bout of chest symptoms has been present daily since April 2019.  Her symptoms started with an apparent sinus infection manifest by inability to swallow, swollen sensation in her face and throat, head congestion and purulent nasal discharge.  Notably she was afebrile.  Since then she has had a tight sensation in her chest and a feeling that she needs to work harder to breathe every day.  She says it improves well.  She is on systemic steroids in that the cough and the sputum decrease but the the other symptoms remain and the cough and sputum eventually come back.  She says her cough is productive early in the morning of some green material which clears during the day.  She admits to a sound in her throat when coughing.  It is non musical and she describes it as screachy."    Additionally, for the last 2 weeks patient complains of left ear pressure with decreased hearing.  Notably she went deep sea diving approximately a month ago.  She also reports alternating blocked nasal passages and rhinorrhea, nasal congestion, runny nose, postnasal drip, throat clearing and head pressure that have been worse for the last couple of months.  She takes Zyrtec " on a daily basis.  She says she if she misses a single dose her head feels like a balloon.  She also takes fluticasone 1 squirt each nostril twice a day and Astelin 1 squirt in alternating nostrils daily.    Additional History:   Past medical history is significant for anxiety with panic attacks.  No Hx of ENT surgery.  Family history is significant for a mother with allergies.  There is no family history of asthma..  Client  reports that she has never smoked. She has never used smokeless tobacco.  Exposures are notable for a dog in the bed.  No exposure to smoke, cats, mold, or unusual substances.  She works as an ..     Patient Active Problem List   Diagnosis    BRCA2 positive    Family history of breast cancer    History of colposcopy with cervical biopsy    Old tear of lateral meniscus of right knee    STEPHANIE (generalized anxiety disorder)    Panic attacks    Depression    Acute pharyngitis    Moderate persistent asthma with acute exacerbation     Current Outpatient Medications on File Prior to Visit   Medication Sig Dispense Refill    azelastine (ASTELIN) 137 mcg (0.1 %) nasal spray 1 spray (137 mcg total) by Nasal route 2 (two) times daily. 30 mL 3    cetirizine (ZYRTEC) 10 MG tablet Take 10 mg by mouth once daily.      FLUoxetine 40 MG capsule Take 2 capsules (80 mg total) by mouth once daily. 60 capsule 3    MARLISSA 0.15-0.03 mg per tablet Take 1 tablet by mouth once daily. 84 tablet 3    [DISCONTINUED] albuterol 90 mcg/actuation inhaler Inhale 2 puffs into the lungs every 6 (six) hours as needed for Wheezing. Rescue      [DISCONTINUED] budesonide-formoterol 80-4.5 mcg (SYMBICORT) 80-4.5 mcg/actuation HFAA Inhale 2 puffs into the lungs 2 (two) times daily. Controller 10.2 g 2    (Magic mouthwash) 1:1:1 Benadryl 12.5mg/5ml liq, aluminum & magnesium hydroxide-simehticone (Maalox), lidocaine viscous 2% Swish and spit 10 mLs every 4 (four) hours as needed. (Patient not taking: Reported  "on 12/16/2019) 180 mL 0    amoxicillin-clavulanate 875-125mg (AUGMENTIN) 875-125 mg per tablet Take 1 tablet by mouth every 12 (twelve) hours. 20 tablet 0    folic acid (FOLVITE) 1 MG tablet Take 2 tablets (2 mg total) by mouth once daily. 60 tablet 3     No current facility-administered medications on file prior to visit.          Review of Systems   Constitutional: Negative for chills and fever.   HENT: Positive for congestion and ear pain. Negative for ear discharge and nosebleeds.    Eyes: Negative for discharge and redness.   Respiratory: Positive for cough, sputum production and shortness of breath. Negative for hemoptysis, wheezing (nonmusical "screaching") and stridor.    Cardiovascular: Positive for chest pain. Negative for palpitations.   Gastrointestinal: Negative for blood in stool, melena and vomiting.   Genitourinary: Negative for hematuria.   Skin: Negative for itching and rash.   Neurological: Negative for seizures and loss of consciousness.       Objective:   /60   Pulse 74   Ht 5' 6" (1.676 m)   Wt 58.2 kg (128 lb 4.9 oz)   SpO2 99%    L/min   BMI 20.71 kg/m²       Physical Exam   Constitutional: She is oriented to person, place, and time and well-developed, well-nourished, and in no distress.   HENT:   Head: Normocephalic and atraumatic.   Nose: Nose normal.   TMs retracted bilaterally.  Nares pink with severe turbinates swelling.  Oropharynx red rimmed without exudate.  Tongue is coated tan.   Eyes: Conjunctivae are normal. Right eye exhibits no discharge. Left eye exhibits no discharge.   Neck: Neck supple.   Cardiovascular: Normal rate, regular rhythm and intact distal pulses.   Pulmonary/Chest: Effort normal and breath sounds normal. No stridor. No respiratory distress. She has no wheezes.   Patient is in no obvious respiratory strep distress, and is speaking in full sentences.  On close observation there is mild sternocleidomastoid muscle use.  There is no abdominal muscle " use or sternal retractions.  Lung sounds are clear and of normal depth   Abdominal: Soft. She exhibits no distension.   Musculoskeletal: She exhibits no edema or deformity.   Lymphadenopathy:     She has no cervical adenopathy.   Neurological: She is alert and oriented to person, place, and time.   Skin: No rash noted. No erythema.   Psychiatric: Memory, affect and judgment normal.       Data: peak flow 440    Demonstrates fair use of Symbicort MDI  Assessment:     1. Moderate persistent asthma with acute exacerbation    2. Chronic rhinitis    3. Need for influenza vaccination        Plan:     Medical decision making:  Patient is apparently presenting with a prolonged exacerbation of asthma of several months duration.  Diagnostically, The 1st step is to document obstructive and reversibility to ensure the working diagnosis is correct.  Therapeutically,  she needs systemic steroids.  At this time she is well compensated and I do not believe she needs a higher level of care.  The steroid course may need to be prolonged.  In addition I recommend high course ICS Laba and an albuterol  nebulizer for home use.  She was instructed to discontinue the Symbicort for now.             For her rhinitis, will address further at future visits.  For now recommended increasing Flonase to 2 squirts each nostril twice a day, discontinuing topical antihistamines, and checking aeroallergen Immunocaps.  She was instructed to discontinue the Astelin         I also recommended a flu shot.  Plan to give Pneumovax next visit    Haley was seen today for other.    Diagnoses and all orders for this visit:    Moderate persistent asthma with acute exacerbation  -     NEBULIZER FOR HOME USE  -     fluticasone-salmeterol diskus inhaler 500-50 mcg; Inhale 1 puff into the lungs 2 (two) times daily. Controller  -     predniSONE (DELTASONE) 20 MG tablet; 3 tablets a day for 5 days, then 1 tablet a day for 3 days, then stop.  -     albuterol (ACCUNEB)  1.25 mg/3 mL Nebu; Take 3 mLs (1.25 mg total) by nebulization every 6 (six) hours as needed. Rescue  -     albuterol (PROVENTIL/VENTOLIN HFA) 90 mcg/actuation inhaler; Inhale 2 puffs into the lungs every 6 (six) hours as needed for Wheezing. Rescue  -     NEBULIZER KIT (SUPPLIES) FOR HOME USE  -     Cancel: Influenza - Quadrivalent (3 years & older) w/ Preservative  -     Complete PFT with bronchodilator; Future  -     Spirometry with/without bronchodilator; Future    Chronic rhinitis  -     IgE; Future  -     Dermatophagoides [place]; Future  -     Dermatophagoides Pteronyssinus; Future  -     [place]; Future  -     Tip; Future  -     Comal; Future  -     English Plantain; Future  -     Oak Pecan; Future  -     Pecan; Future  -     Marsh Elder; Future  -     Ragweed; Future  -     Alternaria; Future  -     Aspergillus; Future  -     Cat; Future  -     Cockroach; Future  -     Dog; Future  -     fluticasone propionate (FLONASE) 50 mcg/actuation nasal spray; 2 sprays (100 mcg total) by Each Nostril route 2 (two) times daily.    Need for influenza vaccination  -     Influenza - Quadrivalent (6 months+) (PF)        Patient Instructions   Testing  Blood work for allergy testing today       Check [facility] in one week for results or call [phone]       Contact me with questions or concerns       I will contact you if anything needs immediate attention.    Breathing test across the street    Treatment    Flu shot    Prednisone burst for 8 days (using 20 mg tablets)  60 mg daily for 5 days,  Then 20 mg for 3 days  Then stop.    Stop Symbicort for now    Advair (500/50) 1 puff twice a day no matter what    Albuterol by inhaler or machine as often as needed    Increase Flonase (= fluticasone) nasal spray:  2 squirts each nostril twice a day   Remember to aim out toward your ear.   Needs to be used regularly 5-14 days for full effect.      Continue Zyrtec    Stop Astelin nasal spray.    Return in 3 days      Follow up in  about 3 days (around 12/19/2019).    Adela العلي MD    Education greater than 50%.  Face to face time 50min        Addend:  Ordering peak flow meter to be dispensed at next visit.

## 2019-12-16 NOTE — LETTER
December 16, 2019      Sudhakar Deutsch, NP  [address]  [facility] [address]           [facility] All[facility]  [address]  [facility] [address]  Phone: [phone]  Fax: [phone]          Patient: Haley Henriquez   MR Number: [id number]   YOB: 1990   Date of Visit: 12/16/2019       Dear Sudhakar Deutsch:    Thank you for referring Haley Henriquez to me for evaluation. Attached you will find relevant portions of my assessment and plan of care.    If you have questions, please do not hesitate to call me. I look forward to following Haley Henriquez along with you.    Sincerely,    Adela العلي MD    Enclosure  CC:  No Recipients    If you would like to receive this communication electronically, please contact [email or web address] or [phone] to request more information on [facility] access.    For providers and/or their staff who would like to refer a patient to [facility], please contact us through our one-stop-shop provider referral line, [facility], at [phone].    If you feel you have received this communication in error or would no longer like to receive these types of communications, please e-mail [email or web address]

## 2019-12-16 NOTE — PATIENT INSTRUCTIONS
Testing  Blood work for allergy testing today       Check [facility] in one week for results or call [phone]       Contact me with questions or concerns       I will contact you if anything needs immediate attention.    Breathing test across the street    Treatment    Flu shot    Prednisone burst for 8 days (using 20 mg tablets)  60 mg daily for 5 days,  Then 20 mg for 3 days  Then stop.    Stop Symbicort for now    Advair (500/50) 1 puff twice a day no matter what    Albuterol by inhaler or machine as often as needed    Increase Flonase (= fluticasone) nasal spray:  2 squirts each nostril twice a day   Remember to aim out toward your ear.   Needs to be used regularly 5-14 days for full effect.      Continue Zyrtec    Stop Astelin nasal spray.    Return in 3 days

## 2019-12-19 ENCOUNTER — OFFICE VISIT (OUTPATIENT)
Dept: ALLERGY | Facility: CLINIC | Age: 29
End: 2019-12-19
Payer: COMMERCIAL

## 2019-12-19 ENCOUNTER — OFFICE VISIT (OUTPATIENT)
Dept: OBSTETRICS AND GYNECOLOGY | Facility: CLINIC | Age: 29
End: 2019-12-19
Attending: OBSTETRICS & GYNECOLOGY
Payer: COMMERCIAL

## 2019-12-19 VITALS
SYSTOLIC BLOOD PRESSURE: 108 MMHG | DIASTOLIC BLOOD PRESSURE: 66 MMHG | BODY MASS INDEX: 20.58 KG/M2 | WEIGHT: 128.06 LBS | HEIGHT: 66 IN

## 2019-12-19 VITALS — BODY MASS INDEX: 20.76 KG/M2 | HEIGHT: 66 IN | RESPIRATION RATE: 16 BRPM | WEIGHT: 129.19 LBS

## 2019-12-19 DIAGNOSIS — Z15.01 BRCA2 POSITIVE: ICD-10-CM

## 2019-12-19 DIAGNOSIS — Z15.09 BRCA2 POSITIVE: ICD-10-CM

## 2019-12-19 DIAGNOSIS — R06.02 SHORTNESS OF BREATH: Primary | ICD-10-CM

## 2019-12-19 DIAGNOSIS — Z01.419 VISIT FOR GYNECOLOGIC EXAMINATION: Primary | ICD-10-CM

## 2019-12-19 DIAGNOSIS — J30.81 ALLERGIC RHINITIS DUE TO ANIMAL (CAT) (DOG) HAIR AND DANDER: ICD-10-CM

## 2019-12-19 DIAGNOSIS — R07.89 CHEST TIGHTNESS: ICD-10-CM

## 2019-12-19 DIAGNOSIS — Z30.41 ENCOUNTER FOR SURVEILLANCE OF CONTRACEPTIVE PILLS: ICD-10-CM

## 2019-12-19 DIAGNOSIS — Z91.89 AT HIGH RISK FOR BREAST CANCER: ICD-10-CM

## 2019-12-19 PROCEDURE — 88142 CYTOPATH C/V THIN LAYER: CPT | Performed by: PATHOLOGY

## 2019-12-19 PROCEDURE — 99999 PR PBB SHADOW E&M-EST. PATIENT-LVL III: CPT | Mod: PBBFAC,,, | Performed by: STUDENT IN AN ORGANIZED HEALTH CARE EDUCATION/TRAINING PROGRAM

## 2019-12-19 PROCEDURE — 99214 PR OFFICE/OUTPT VISIT, EST, LEVL IV, 30-39 MIN: ICD-10-PCS | Mod: S$GLB,,, | Performed by: STUDENT IN AN ORGANIZED HEALTH CARE EDUCATION/TRAINING PROGRAM

## 2019-12-19 PROCEDURE — 88175 CYTOPATH C/V AUTO FLUID REDO: CPT

## 2019-12-19 PROCEDURE — 88141 PR  CYTOPATH CERV/VAG INTERPRET: ICD-10-PCS | Mod: ,,, | Performed by: PATHOLOGY

## 2019-12-19 PROCEDURE — 99999 PR PBB SHADOW E&M-EST. PATIENT-LVL III: CPT | Mod: PBBFAC,,, | Performed by: OBSTETRICS & GYNECOLOGY

## 2019-12-19 PROCEDURE — 99999 PR PBB SHADOW E&M-EST. PATIENT-LVL III: ICD-10-PCS | Mod: PBBFAC,,, | Performed by: OBSTETRICS & GYNECOLOGY

## 2019-12-19 PROCEDURE — 88141 CYTOPATH C/V INTERPRET: CPT | Mod: ,,, | Performed by: PATHOLOGY

## 2019-12-19 PROCEDURE — 99395 PR PREVENTIVE VISIT,EST,18-39: ICD-10-PCS | Mod: S$GLB,,, | Performed by: OBSTETRICS & GYNECOLOGY

## 2019-12-19 PROCEDURE — 99214 OFFICE O/P EST MOD 30 MIN: CPT | Mod: S$GLB,,, | Performed by: STUDENT IN AN ORGANIZED HEALTH CARE EDUCATION/TRAINING PROGRAM

## 2019-12-19 PROCEDURE — 99395 PREV VISIT EST AGE 18-39: CPT | Mod: S$GLB,,, | Performed by: OBSTETRICS & GYNECOLOGY

## 2019-12-19 PROCEDURE — 87624 HPV HI-RISK TYP POOLED RSLT: CPT

## 2019-12-19 PROCEDURE — 99999 PR PBB SHADOW E&M-EST. PATIENT-LVL III: ICD-10-PCS | Mod: PBBFAC,,, | Performed by: STUDENT IN AN ORGANIZED HEALTH CARE EDUCATION/TRAINING PROGRAM

## 2019-12-19 RX ORDER — LEVONORGESTREL AND ETHINYL ESTRADIOL 0.15-0.03
1 KIT ORAL DAILY
Qty: 84 TABLET | Refills: 3 | Status: SHIPPED | OUTPATIENT
Start: 2019-12-19 | End: 2021-01-26

## 2019-12-19 NOTE — PROGRESS NOTES
"CC: Well woman exam    Haley Henriquez is a 29 y.o. female  presents for a well woman exam.  She has no issues, problems, or complaints.    Reports last cycle was a little heavier and longer but this not usual.      Past Medical History:   Diagnosis Date    Allergy     Anxiety     Asthma     BRCA2 positive     Depression     Headache     Hx of psychiatric care     Panic disorder     Psychiatric problem     Therapy        Past Surgical History:   Procedure Laterality Date    COLPOSCOPY      KNEE SURGERY         OB History    Para Term  AB Living   0 0 0 0 0 0   SAB TAB Ectopic Multiple Live Births   0 0 0 0         Family History   Problem Relation Age of Onset    Breast cancer Maternal Aunt     BRCA 1/2 Mother     Ovarian cancer Maternal Grandmother     Anxiety disorder Maternal Grandmother     Depression Maternal Grandmother     Depression Father     Anxiety disorder Father     Depression Brother     Anxiety disorder Brother     Anxiety disorder Maternal Grandfather     Depression Maternal Grandfather     Anxiety disorder Paternal Grandfather     Depression Paternal Grandfather     Anxiety disorder Paternal Grandmother     Depression Paternal Grandmother     Colon cancer Neg Hx        Social History     Tobacco Use    Smoking status: Never Smoker    Smokeless tobacco: Never Used   Substance Use Topics    Alcohol use: Yes     Comment: socially    Drug use: Not Currently     Frequency: 1.0 times per week     Types: Marijuana     Comment: 0nce per week or less       /66   Ht 5' 6" (1.676 m)   Wt 58.1 kg (128 lb 1.4 oz)   LMP 2019   BMI 20.67 kg/m²     ROS:  GENERAL: Denies weight gain or weight loss. Feeling well overall.   SKIN: Denies rash or lesions.   HEAD: Denies head injury or headache.   NODES: Denies enlarged lymph nodes.   CHEST: Denies chest pain or shortness of breath.   CARDIOVASCULAR: Denies palpitations or left sided chest pain. "   ABDOMEN: No abdominal pain, constipation, diarrhea, nausea, vomiting or rectal bleeding.   URINARY: No frequency, dysuria, hematuria, or burning on urination.  REPRODUCTIVE: See HPI.   BREASTS: The patient performs breast self-examination and denies pain, lumps, or nipple discharge.   HEMATOLOGIC: No easy bruisability or excessive bleeding.  MUSCULOSKELETAL: Denies joint pain or swelling.   NEUROLOGIC: Denies syncope or weakness.   PSYCHIATRIC: Denies depression, anxiety or mood swings.    Physical Exam:    APPEARANCE: Well nourished, well developed, in no acute distress.  AFFECT: WNL, alert and oriented x 3  SKIN: No acne or hirsutism  NECK: Neck symmetric without masses or thyromegaly  NODES: No inguinal, cervical, axillary, or femoral lymph node enlargement  CHEST: Good respiratory effect  ABDOMEN: Soft.  No tenderness or masses.  No hepatosplenomegaly.  No hernias.  BREASTS: Symmetrical, no skin changes or visible lesions.  No palpable masses, nipple discharge bilaterally.  PELVIC: Normal external genitalia without lesions.  Normal hair distribution.  Adequate perineal body, normal urethral meatus.  Vagina moist and well rugated without lesions or discharge.  Cervix pink, without lesions, discharge or tenderness.  No significant cystocele or rectocele.  Bimanual exam shows uterus to be normal size, regular, mobile and nontender.  Adnexa without masses or tenderness.    EXTREMITIES: No edema.    ASSESSMENT AND PLAN  1. Visit for gynecologic examination  HPV High Risk Genotypes, PCR    Liquid-Based Pap Smear, Screening   2. BRCA2 positive  Ambulatory Referral to Breast Surgery   3. At high risk for breast cancer  MRI Breast w/wo Contrast, w/CAD, Bilateral   4. Encounter for surveillance of contraceptive pills  SAVAGE, 28, 0.15-0.03 mg per tablet       Patient was counseled today on A.C.S. Pap guidelines and recommendations for yearly pelvic exams, pap smears every 5 years with HPV co-testing, mammograms and  monthly self breast exams; to see her PCP for other health maintenance.     Discussed the surveillance recommendations for women with BRCA 2 for breast cancer: for women aged 30 years and older with known BRCA mutations or other actionable breast cancer mutations, recommended breast cancer surveillance includes annual mammography and annual breast MRI with contrast, often alternating every 6 months.  Consult placed to Breast Surgery to facilitate scheduling and ordering.      Follow up in about 1 year (around 12/19/2020).

## 2019-12-19 NOTE — PATIENT INSTRUCTIONS
Normal spirometry during symptoms is distinctly abnormal.  Endoscopy at ENT showed no evidence of reflux backing up into your throat (LPR) or of abnormal movement of year vocal cords (vocal cord dysfunction).    Instructions:  Discontinue prednisone  Continue inhalers for now  Return when Neck clinical baseline

## 2019-12-19 NOTE — PROGRESS NOTES
Allergy Clinic Note  [facility]    Subjective:      Patient ID: Haley Henriquez is a 29 y.o. female.    Chief Complaint: Asthma      Allergy problem list:     Apparent asthma exacerbation  Anxiety    History of Present Illness:  29-year-old female is here at my request to follow-up apparent asthma exacerbation treated with prednisone.    Related meds  Symbicort in winter  Albuterol  Astelin    Patient was seen 3 days ago with chest pain and shortness of breath associated with some sternocleidomastoid muscle use.  I diagnosed asthma exacerbations and treated her with prednisone.  Immediately following that visit, she had spirometry done which--surprisingly--was normal.     Today patient reports she is approximately 30% improved.  She is on day 4 of an intended 8 day course of steroids.  She reports some trouble getting asleep and staying asleep which she attributes to the prednisone.  She denies any nocturnal awakenings due to shortness of breath.  She says she obtains partial relief from albuterol which lasts anywhere from 2-6 hours.     No new problems or complaints.    She reports ENT endoscopy in May of 2019.  Results are shown below.  There was no evidence of abnormal vocal cord motion or of LPR.    Addendum to HPI:  She says the chest discomfort never goes away completely.    At her initial visit client reported  a history of seasonal chest symptoms for the last 7-8 years.  On closer questioning she says has been using albuterol at least once a day throughout the year for the last 7 day yesrs.  Symptoms have been worse in winter and she usually takes a controller inhaler, either Advair or more recently Symbicort.    Her current bout of chest symptoms has been present daily since April 2019.  Her symptoms started with an apparent sinus infection manifest by inability to swallow, swollen sensation in her face and throat, head congestion and purulent nasal discharge.  Notably she was afebrile.   "Since then she has had a tight sensation in her chest and a feeling that she needs to work harder to breathe every day.  She says it improves well.  She is on systemic steroids in that the cough and the sputum decrease but the the other symptoms remain and the cough and sputum eventually come back.  She says her cough is productive early in the morning of some green material which clears during the day.  She admits to a sound in her throat when coughing.  It is non musical and she describes it as screachy."    Additionally, for the last 2 weeks patient complains of left ear pressure with decreased hearing.  Notably she went deep sea diving approximately a month ago.  She also reports alternating blocked nasal passages and rhinorrhea, nasal congestion, runny nose, postnasal drip, throat clearing and head pressure that have been worse for the last couple of months.  She takes Zyrtec on a daily basis.  She says she if she misses a single dose her head feels like a balloon.  She also takes fluticasone 1 squirt each nostril twice a day and Astelin 1 squirt in alternating nostrils daily.    Additional History:   Past medical history is significant for anxiety with panic attacks.  No Hx of ENT surgery.  Family history is significant for a mother with allergies.  There is no family history of asthma..  Client  reports that she has never smoked. She has never used smokeless tobacco.  Exposures are notable for a dog in the bed.  No exposure to smoke, cats, mold, or unusual substances.  She works as an ..     Patient Active Problem List   Diagnosis    BRCA2 positive    Family history of breast cancer    History of colposcopy with cervical biopsy    Old tear of lateral meniscus of right knee    STEPHANIE (generalized anxiety disorder)    Panic attacks    Depression    Acute pharyngitis    Moderate persistent asthma with acute exacerbation     Current Outpatient Medications on File Prior to Visit   Medication Sig " Dispense Refill    albuterol (ACCUNEB) 1.25 mg/3 mL Nebu Take 3 mLs (1.25 mg total) by nebulization every 6 (six) hours as needed. Rescue 60 vial 1    albuterol (PROVENTIL/VENTOLIN HFA) 90 mcg/actuation inhaler Inhale 2 puffs into the lungs every 6 (six) hours as needed for Wheezing. Rescue 18 g 3    cetirizine (ZYRTEC) 10 MG tablet Take 10 mg by mouth once daily.      FLUoxetine 40 MG capsule Take 2 capsules (80 mg total) by mouth once daily. (Patient taking differently: Take 40 mg by mouth once daily. ) 60 capsule 3    fluticasone propionate (FLONASE) 50 mcg/actuation nasal spray 2 sprays (100 mcg total) by Each Nostril route 2 (two) times daily. 2 Bottle 5    fluticasone-salmeterol diskus inhaler 500-50 mcg Inhale 1 puff into the lungs 2 (two) times daily. Controller 60 each 11    predniSONE (DELTASONE) 20 MG tablet 3 tablets a day for 5 days, then 1 tablet a day for 3 days, then stop. 18 tablet 0    [DISCONTINUED] MARLISSA 0.15-0.03 mg per tablet Take 1 tablet by mouth once daily. 84 tablet 3    azelastine (ASTELIN) 137 mcg (0.1 %) nasal spray 1 spray (137 mcg total) by Nasal route 2 (two) times daily. 30 mL 3    MARLISSA, 28, 0.15-0.03 mg per tablet Take 1 tablet by mouth once daily. 84 tablet 3    [DISCONTINUED] (Magic mouthwash) 1:1:1 Benadryl 12.5mg/5ml liq, aluminum & magnesium hydroxide-simehticone (Maalox), lidocaine viscous 2% Swish and spit 10 mLs every 4 (four) hours as needed. (Patient not taking: Reported on 12/16/2019) 180 mL 0    [DISCONTINUED] amoxicillin-clavulanate 875-125mg (AUGMENTIN) 875-125 mg per tablet Take 1 tablet by mouth every 12 (twelve) hours. 20 tablet 0    [DISCONTINUED] folic acid (FOLVITE) 1 MG tablet Take 2 tablets (2 mg total) by mouth once daily. 60 tablet 3     No current facility-administered medications on file prior to visit.          Review of Systems   Constitutional: Negative for chills and fever.   HENT: Negative for ear discharge and nosebleeds.    Eyes:  "Negative for discharge and redness.   Respiratory: Positive for shortness of breath. Negative for hemoptysis, sputum production and stridor.    Cardiovascular: Positive for chest pain. Negative for palpitations.   Gastrointestinal: Negative for blood in stool, melena and vomiting.   Genitourinary: Negative for dysuria and hematuria.   Skin: Negative for itching and rash.   Neurological: Negative for seizures and loss of consciousness.   Endo/Heme/Allergies: Positive for environmental allergies (CAT).       Objective:   Resp 16   Ht 5' 6" (1.676 m)   Wt 58.6 kg (129 lb 3 oz)   LMP 12/09/2019   BMI 20.85 kg/m²       Physical Exam   Constitutional: She is oriented to person, place, and time and well-developed, well-nourished, and in no distress.   HENT:   Head: Normocephalic and atraumatic.   Nose: Nose normal.   Mouth/Throat: No oropharyngeal exudate.   Nares pink with moderate turbinates swelling.  Oropharynx benign without exudate.  Tongue is not coated.   Eyes: Conjunctivae are normal. Right eye exhibits no discharge. Left eye exhibits no discharge.   Neck: Neck supple.   Cardiovascular: Normal rate, regular rhythm, normal heart sounds and intact distal pulses.   Pulmonary/Chest: Effort normal. No stridor. No respiratory distress. She has no wheezes.   No respiratory distress.  Speaking in full sentences.  No accessory muscle use.   Abdominal: She exhibits no distension.   Musculoskeletal: She exhibits no edema or deformity.   Lymphadenopathy:     She has no cervical adenopathy.   Neurological: She is alert and oriented to person, place, and time.   Skin: No rash noted. No erythema.   Psychiatric: Affect and judgment normal.       Data: last visit peak flow 440    Aeroallergen testing by the Immunocap method opened sees 12/16/2019) showed class 2 reactivity to cat and lesser reactivity to [place] grass and dog   Class II  CAT   Class I   [place] grass, dog  All other aeroallergens less than 0.35 KU/L  Total " serum IgE 188\    Laryngoscopy: 5/9/2019, Sudhakar Deutsch NP    Areas examined:  Nasal cavities, nasopharynx, oropharynx, hypopharynx, larynx and vocal cords    Laryngoscope size:  4 mm  Nose Intranasal:      Mucosa no polyps     Mucosa ulcers not present     No mucosa lesions present     No septum gross deformity     Enlarged turbinates  Nasopharynx:      No mucosa lesions     Adenoids not present     Posterior choanae not patent     Eustachian tube patent  Larynx/hypopharynx:      No epiglottis lesions     No epiglottis edema     No AE folds lesions     No vocal cord polyps     Not equal and normal bilateral     No hypopharynx lesions     No piriform sinus pooling     No piriform sinus lesions     No post cricoid edema     No post cricoid erythema     Mucosal edema  Mucoid mucous flow throughout nasal cavity to posterior nasoharynx  Erythematous posterior nasopharynx with cobblestoning.  Slight middle turbinate hypertrophy  Eustachian tube edema bilaterally  No purulence or polyps.  No masses.  Good Vocal cord mobility.    Demonstrates fair use of Symbicort MDI  Assessment:     1. Shortness of breath    2. Chest tightness    3. Allergic rhinitis due to animal (cat) (dog) hair and dander        Plan:     Medical decision making:  This is a very difficult case.  Patient with presented with a clinical picture consistent with asthma; however PFTs during symptoms is entirely normal.  The 2 most common min occurs of asthma have already been evaluated:  LPR and vocal cord dysfunction.  At this point my primary goal is to do no harm.  I recommend she discontinue her steroids.  She can continue her inhalers for now.  I would like to see her back when she is at her clinical baseline.  Will probably do a methacholine challenge.  Other testing remains to be discussed.         Her rhinitis symptoms appear to be mostly nonallergic.  She does have a class 2 reaction to cat but no exposure.    Haley was seen today for  asthma.    Diagnoses and all orders for this visit:    Shortness of breath and Chest tightness  Discontinue prednisone  Continue inhalers for now  Anticipate methacholine  Return to clinic when at clinical baseline    Allergic rhinitis due to animal (cat) (dog) hair and dander  Continue to avoid cats        Patient Instructions   Normal spirometry during symptoms is distinctly abnormal.  Endoscopy at ENT showed no evidence of reflux backing up into your throat (LPR) or of abnormal movement of year vocal cords (vocal cord dysfunction).    Instructions:  Discontinue prednisone  Continue inhalers for now  Return when Neck clinical baseline      Follow up in about 3 weeks (around 1/9/2020) for Continuing diagnostic workup.    Adela العلي MD          Addend:  Ordering peak flow meter to be dispensed at next visit.

## 2019-12-20 ENCOUNTER — TELEPHONE (OUTPATIENT)
Dept: SURGERY | Facility: CLINIC | Age: 29
End: 2019-12-20

## 2019-12-20 NOTE — TELEPHONE ENCOUNTER
Contacted the patient regarding scheduling appointment for genetic mutation.  The patient is scheduled to be seen on Thursday 1/9/20 9 am with .  The patient voiced understanding of appointment date, time, and location.  Patient stated she will receive appointment reminder through patient portal.

## 2019-12-24 LAB
HPV HR 12 DNA SPEC QL NAA+PROBE: NEGATIVE
HPV16 AG SPEC QL: NEGATIVE
HPV18 DNA SPEC QL NAA+PROBE: NEGATIVE

## 2020-01-09 ENCOUNTER — OFFICE VISIT (OUTPATIENT)
Dept: SURGERY | Facility: CLINIC | Age: 30
End: 2020-01-09
Payer: COMMERCIAL

## 2020-01-09 ENCOUNTER — TELEPHONE (OUTPATIENT)
Dept: ADMINISTRATIVE | Facility: OTHER | Age: 30
End: 2020-01-09

## 2020-01-09 VITALS
SYSTOLIC BLOOD PRESSURE: 112 MMHG | BODY MASS INDEX: 20.67 KG/M2 | HEIGHT: 66 IN | HEART RATE: 64 BPM | DIASTOLIC BLOOD PRESSURE: 68 MMHG | TEMPERATURE: 98 F

## 2020-01-09 DIAGNOSIS — Z91.89 AT HIGH RISK FOR BREAST CANCER: ICD-10-CM

## 2020-01-09 DIAGNOSIS — Z15.09 BRCA2 POSITIVE: Primary | ICD-10-CM

## 2020-01-09 DIAGNOSIS — Z15.01 BRCA2 POSITIVE: Primary | ICD-10-CM

## 2020-01-09 PROCEDURE — 99999 PR PBB SHADOW E&M-EST. PATIENT-LVL III: ICD-10-PCS | Mod: PBBFAC,,, | Performed by: SURGERY

## 2020-01-09 PROCEDURE — 99243 OFF/OP CNSLTJ NEW/EST LOW 30: CPT | Mod: S$GLB,,, | Performed by: SURGERY

## 2020-01-09 PROCEDURE — 99999 PR PBB SHADOW E&M-EST. PATIENT-LVL III: CPT | Mod: PBBFAC,,, | Performed by: SURGERY

## 2020-01-09 PROCEDURE — 99243 PR OFFICE CONSULTATION,LEVEL III: ICD-10-PCS | Mod: S$GLB,,, | Performed by: SURGERY

## 2020-01-09 NOTE — PROGRESS NOTES
Patient ID: Haley Henriquez is a 29 y.o. female.    Chief Complaint: Breast Cancer Screening      HPI:  Mrs. Henriquez is a 30 yo premenopausal female who's is BRCA2 positive who presented to the clinic to establish care since moving from [place]. She was tested for BRCA after a positive test in her family in . She has 1 aunt with breast cancer and mother with ovarian cancer. she's been getting alternating yearly breast MRI and mammogram and they were all negative. She is currently under the OBGYN care of Dr Oliva.     She is  and wants to have children in the future. Age of menarche was 12, she is currently on OCP with regular menstrual cycles. Patient occasionally performs self breast exam and has not discovered bleeding, mass or tenderness.       Review of Systems   Constitutional: Negative for activity change, appetite change, fatigue, fever and unexpected weight change.   HENT: Positive for sinus pressure. Negative for postnasal drip and sinus pain.    Respiratory: Negative for cough, shortness of breath and wheezing.    Cardiovascular: Negative for chest pain and palpitations.   Gastrointestinal: Negative for abdominal distention, abdominal pain, diarrhea and nausea.   Genitourinary: Negative for difficulty urinating and dysuria.   Musculoskeletal: Negative for arthralgias and neck pain.   Skin: Negative for color change and wound.   Neurological: Negative for tremors, weakness and headaches.   Hematological: Does not bruise/bleed easily.   Psychiatric/Behavioral: Negative for agitation.       Current Outpatient Medications   Medication Sig Dispense Refill    albuterol (ACCUNEB) 1.25 mg/3 mL Nebu Take 3 mLs (1.25 mg total) by nebulization every 6 (six) hours as needed. Rescue 60 vial 1    albuterol (PROVENTIL/VENTOLIN HFA) 90 mcg/actuation inhaler Inhale 2 puffs into the lungs every 6 (six) hours as needed for Wheezing. Rescue 18 g 3    azelastine (ASTELIN) 137 mcg (0.1 %) nasal spray 1 spray  (137 mcg total) by Nasal route 2 (two) times daily. 30 mL 3    cetirizine (ZYRTEC) 10 MG tablet Take 10 mg by mouth once daily.      FLUoxetine 40 MG capsule Take 2 capsules (80 mg total) by mouth once daily. (Patient taking differently: Take 40 mg by mouth once daily. ) 60 capsule 3    fluticasone propionate (FLONASE) 50 mcg/actuation nasal spray 2 sprays (100 mcg total) by Each Nostril route 2 (two) times daily. 2 Bottle 5    fluticasone-salmeterol diskus inhaler 500-50 mcg Inhale 1 puff into the lungs 2 (two) times daily. Controller 60 each 11    MARLISSA, 28, 0.15-0.03 mg per tablet Take 1 tablet by mouth once daily. 84 tablet 3    predniSONE (DELTASONE) 20 MG tablet 3 tablets a day for 5 days, then 1 tablet a day for 3 days, then stop. 18 tablet 0     No current facility-administered medications for this visit.        Review of patient's allergies indicates:  No Known Allergies    Past Medical History:   Diagnosis Date    Allergy     Anxiety     Asthma     BRCA2 positive     Depression     Headache     Hx of psychiatric care     Panic disorder     Psychiatric problem     Therapy        Past Surgical History:   Procedure Laterality Date    COLPOSCOPY      KNEE SURGERY         Family History   Problem Relation Age of Onset    Breast cancer Maternal Aunt     BRCA 1/2 Mother     Ovarian cancer Maternal Grandmother     Anxiety disorder Maternal Grandmother     Depression Maternal Grandmother     Depression Father     Anxiety disorder Father     Depression Brother     Anxiety disorder Brother     Anxiety disorder Maternal Grandfather     Depression Maternal Grandfather     Anxiety disorder Paternal Grandfather     Depression Paternal Grandfather     Anxiety disorder Paternal Grandmother     Depression Paternal Grandmother     Colon cancer Neg Hx        Social History     Socioeconomic History    Marital status: Single     Spouse name: Not on file    Number of children: Not on file     Years of education: Not on file    Highest education level: Not on file   Occupational History    Not on file   Social Needs    Financial resource strain: Not on file    Food insecurity:     Worry: Not on file     Inability: Not on file    Transportation needs:     Medical: Not on file     Non-medical: Not on file   Tobacco Use    Smoking status: Never Smoker    Smokeless tobacco: Never Used   Substance and Sexual Activity    Alcohol use: Yes     Comment: socially    Drug use: Not Currently     Frequency: 1.0 times per week     Types: Marijuana     Comment: 0nce per week or less    Sexual activity: Yes     Partners: Male     Birth control/protection: OCP   Lifestyle    Physical activity:     Days per week: Not on file     Minutes per session: Not on file    Stress: Not on file   Relationships    Social connections:     Talks on phone: Not on file     Gets together: Not on file     Attends Alevism service: Not on file     Active member of club or organization: Not on file     Attends meetings of clubs or organizations: Not on file     Relationship status: Not on file   Other Topics Concern    Patient feels they ought to cut down on drinking/drug use Not Asked    Patient annoyed by others criticizing their drinking/drug use Not Asked    Patient has felt bad or guilty about drinking/drug use Not Asked    Patient has had a drink/used drugs as an eye opener in the AM Not Asked   Social History Narrative    Not on file       Vitals:    01/09/20 0914   BP: 112/68   Pulse: 64   Temp: 98.2 °F (36.8 °C)     Physical Exam   Pulmonary/Chest: She exhibits no mass, no tenderness and no laceration. Right breast exhibits no inverted nipple, no mass, no nipple discharge, no skin change and no tenderness. Left breast exhibits no inverted nipple, no mass, no nipple discharge, no skin change and no tenderness. No breast swelling or bleeding. Breasts are symmetrical.         Assessment & Plan:   Ms Henriquez is a 29  yo premenopausal female whos BRCA2 positive presented to us to establish care since moving from [place].     - patient educated on the risks of cancers for BRCA 2 positive  - MRI ordered.       I have personally taken the history and examined this patient, and I agree with the history, physical exam, assessment, and plan as written and outlined and stated above per the medical student.    The patient presents for consultation with a known positive BRCA 2 positive genetic mutation.  She has had both breast MRI and mammograms for screening purposes in the past but states she is behind on routine imaging not having had anything since 2018.    The patient is a  0 with a family history revealing a maternal aunt and a maternal grandmother with positive BRCA 2 genetic mutations.  Her maternal aunt had breast cancer in her early 40s and her maternal grandmother had a history of ovarian cancer.  She also has a history of maternal great aunts also with breast cancer with the youngest age at diagnosis being 29.    The patient is here for consultation regarding a positive genetic mutation and referred by Dr. Raven Oliva.  The patient's mother had a bilateral prophylactic mastectomy at age 55 due to the positive BRCA2 genetic mutation status.  The patient moved here from [place] approximately 4 years ago in the recent past.    The patient expresses a desire to breast feed in the future and is not interested in prophylactic mastectomy at this point and would consider it at a later age in life similar to when her mother had it or certainly once she has completed breastfeeding.  She is currently  0 status and does not anticipate being pregnant any time soon.    Her bilateral clinical breast exam is within normal limits with no suspicious masses nodule density skin changes or lymphadenopathy.  She has a nipple piercing of the right nipple.    A/P:  Very pleasant 29-year-old  female with a known positive  BRCA 2 genetic mutation with family history as noted above. Patient interested in high risk screening and is not currently interested in prophylactic mastectomy which is very appropriate.  We will order a breast screening MRI now in January of 2020 and then order a bilateral screening digital mammogram with tomosynthesis in July of 2020 and have her follow up with 1 of our mid-level [facility] providers at that time for clinical breast exam.  We also will recommend that she be referred to GYN oncology vs primary gynecologist, Dr. Oliva, for screening such as CA-125 levels and transvaginal pelvic ultrasounds given the BRCA 2 positive status.  We discussed that typical recommendations for prophylactic bilateral oophorectomies typically begin at age 35 and certainly patient would like to have children and breast feed prior to entertaining any prophylactic surgeries.  She remains highly motivated to maximize high risk screening from both breast and ovarian standpoints.

## 2020-01-09 NOTE — LETTER
January 12, 2020      Raven Oliva MD  [address]  [address]  [place] [address]           [facility]  [address], [address]  [facility] [address]  Phone: [phone]  Fax: [phone]          Patient: Haley Henriquez   MR Number: [id number]   YOB: 1990   Date of Visit: 1/9/2020       Dear Dr. Raven Oliva:    Thank you for referring Haley Henriquez to me for evaluation. Attached you will find relevant portions of my assessment and plan of care.    If you have questions, please do not hesitate to call me. I look forward to following Haley Henriquez along with you.    Sincerely,    Amado Dillon MD    Enclosure  CC:  No Recipients    If you would like to receive this communication electronically, please contact [email or web address] or [phone] to request more information on [facility] access.    For providers and/or their staff who would like to refer a patient to [facility], please contact us through our one-stop-shop provider referral line, [facility], at [phone].    If you feel you have received this communication in error or would no longer like to receive these types of communications, please e-mail [email or web address]

## 2020-01-10 ENCOUNTER — TELEPHONE (OUTPATIENT)
Dept: SURGERY | Facility: CLINIC | Age: 30
End: 2020-01-10

## 2020-01-10 NOTE — TELEPHONE ENCOUNTER
Attempted to contact patient to schedule breast MRI. Unable to reach at this time. Voicemail left with brief message, my name, and direct number.

## 2020-01-13 ENCOUNTER — TELEPHONE (OUTPATIENT)
Dept: SURGERY | Facility: CLINIC | Age: 30
End: 2020-01-13

## 2020-01-13 NOTE — TELEPHONE ENCOUNTER
Contacted patient to schedule breast MRI. Patient scheduled for 1/20/2020 at 4:00 PM at [facility]. Location, time, and date reviewed. Patient verbalized understanding.

## 2020-01-14 ENCOUNTER — TELEPHONE (OUTPATIENT)
Dept: GYNECOLOGIC ONCOLOGY | Facility: CLINIC | Age: 30
End: 2020-01-14

## 2020-01-15 ENCOUNTER — LAB VISIT (OUTPATIENT)
Dept: LAB | Facility: OTHER | Age: 30
End: 2020-01-15
Attending: OBSTETRICS & GYNECOLOGY
Payer: COMMERCIAL

## 2020-01-15 ENCOUNTER — INITIAL CONSULT (OUTPATIENT)
Dept: GYNECOLOGIC ONCOLOGY | Facility: CLINIC | Age: 30
End: 2020-01-15
Attending: SURGERY
Payer: COMMERCIAL

## 2020-01-15 VITALS
HEIGHT: 66 IN | HEART RATE: 55 BPM | BODY MASS INDEX: 20.41 KG/M2 | SYSTOLIC BLOOD PRESSURE: 114 MMHG | WEIGHT: 127 LBS | DIASTOLIC BLOOD PRESSURE: 57 MMHG

## 2020-01-15 DIAGNOSIS — Z15.09 BRCA2 POSITIVE: Primary | ICD-10-CM

## 2020-01-15 DIAGNOSIS — Z15.01 BRCA2 POSITIVE: Primary | ICD-10-CM

## 2020-01-15 DIAGNOSIS — Z15.09 BRCA2 POSITIVE: ICD-10-CM

## 2020-01-15 DIAGNOSIS — Z15.01 BRCA2 POSITIVE: ICD-10-CM

## 2020-01-15 LAB
FINAL PATHOLOGIC DIAGNOSIS: NORMAL
Lab: NORMAL

## 2020-01-15 PROCEDURE — 36415 COLL VENOUS BLD VENIPUNCTURE: CPT

## 2020-01-15 PROCEDURE — 99204 PR OFFICE/OUTPT VISIT, NEW, LEVL IV, 45-59 MIN: ICD-10-PCS | Mod: S$GLB,,, | Performed by: OBSTETRICS & GYNECOLOGY

## 2020-01-15 PROCEDURE — 99204 OFFICE O/P NEW MOD 45 MIN: CPT | Mod: S$GLB,,, | Performed by: OBSTETRICS & GYNECOLOGY

## 2020-01-15 PROCEDURE — 99999 PR PBB SHADOW E&M-EST. PATIENT-LVL III: ICD-10-PCS | Mod: PBBFAC,,, | Performed by: OBSTETRICS & GYNECOLOGY

## 2020-01-15 PROCEDURE — 86304 IMMUNOASSAY TUMOR CA 125: CPT

## 2020-01-15 PROCEDURE — 99999 PR PBB SHADOW E&M-EST. PATIENT-LVL III: CPT | Mod: PBBFAC,,, | Performed by: OBSTETRICS & GYNECOLOGY

## 2020-01-16 LAB — CANCER AG125 SERPL-ACNC: 8 U/ML (ref 0–30)

## 2020-01-20 ENCOUNTER — HOSPITAL ENCOUNTER (OUTPATIENT)
Dept: RADIOLOGY | Facility: HOSPITAL | Age: 30
Discharge: HOME | End: 2020-01-20
Attending: SURGERY
Payer: COMMERCIAL

## 2020-01-20 ENCOUNTER — HOSPITAL ENCOUNTER (OUTPATIENT)
Dept: RADIOLOGY | Facility: OTHER | Age: 30
Discharge: HOME | End: 2020-01-20
Attending: OBSTETRICS & GYNECOLOGY
Payer: COMMERCIAL

## 2020-01-20 DIAGNOSIS — Z15.09 BRCA2 POSITIVE: ICD-10-CM

## 2020-01-20 DIAGNOSIS — Z15.01 BRCA2 POSITIVE: ICD-10-CM

## 2020-01-20 DIAGNOSIS — Z91.89 AT HIGH RISK FOR BREAST CANCER: ICD-10-CM

## 2020-01-20 PROCEDURE — 76856 US PELVIS COMP WITH TRANSVAG NON-OB (XPD): ICD-10-PCS | Mod: 26,,, | Performed by: RADIOLOGY

## 2020-01-20 PROCEDURE — 25500020 PHARM REV CODE 255: Performed by: SURGERY

## 2020-01-20 PROCEDURE — 76856 US EXAM PELVIC COMPLETE: CPT | Mod: 26,,, | Performed by: RADIOLOGY

## 2020-01-20 PROCEDURE — 76830 TRANSVAGINAL US NON-OB: CPT | Mod: 26,,, | Performed by: RADIOLOGY

## 2020-01-20 PROCEDURE — 76856 US EXAM PELVIC COMPLETE: CPT | Mod: TC

## 2020-01-20 PROCEDURE — A9577 INJ MULTIHANCE: HCPCS | Performed by: SURGERY

## 2020-01-20 PROCEDURE — 77049 MRI BREAST C-+ W/CAD BI: CPT | Mod: 26,,, | Performed by: RADIOLOGY

## 2020-01-20 PROCEDURE — 77049 MRI BREAST W/WO CONTRAST, W/CAD, BILATERAL: ICD-10-PCS | Mod: 26,,, | Performed by: RADIOLOGY

## 2020-01-20 PROCEDURE — 77049 MRI BREAST C-+ W/CAD BI: CPT | Mod: TC

## 2020-01-20 PROCEDURE — 76830 US PELVIS COMP WITH TRANSVAG NON-OB (XPD): ICD-10-PCS | Mod: 26,,, | Performed by: RADIOLOGY

## 2020-01-20 RX ADMIN — GADOBENATE DIMEGLUMINE 12 ML: 529 INJECTION, SOLUTION INTRAVENOUS at 04:01

## 2020-01-23 ENCOUNTER — TELEPHONE (OUTPATIENT)
Dept: SURGERY | Facility: CLINIC | Age: 30
End: 2020-01-23

## 2020-01-23 ENCOUNTER — PATIENT MESSAGE (OUTPATIENT)
Dept: ALLERGY | Facility: CLINIC | Age: 30
End: 2020-01-23

## 2020-01-23 NOTE — TELEPHONE ENCOUNTER
I called this patient today and reviewed her benign breast MRI results.  She had no further questions or concerns at this time.    ~Paz Richardson PA-C

## 2020-02-09 NOTE — PROGRESS NOTES
Subjective:      Patient ID: Haley Henriquez is a 29 y.o. female.    Chief Complaint: Advice Only (GENE MUTATION)      HPI  Referred by Dr. Dillon for BRCA2 mutation and ovarian cancer risk reduction management.     Recently moved from [facility]. She was tested for BRCA after a positive test in her family in 2013. She has 1 aunt with breast cancer and mother with ovarian cancer.  Also a patient of Dr Oliva. P0 and desires fertility. Currently on OCPs for contraception.      12/2019 pap and HPV negative.     Review of Systems   Constitutional: Negative for appetite change, chills, fatigue and fever.   HENT: Negative for mouth sores.    Respiratory: Negative for cough and shortness of breath.    Cardiovascular: Negative for leg swelling.   Gastrointestinal: Negative for abdominal pain, blood in stool, constipation and diarrhea.   Endocrine: Negative for cold intolerance.   Genitourinary: Negative for dysuria and vaginal bleeding.   Musculoskeletal: Negative for myalgias.   Skin: Negative for rash.   Allergic/Immunologic: Negative.    Neurological: Negative for weakness and numbness.   Hematological: Negative for adenopathy. Does not bruise/bleed easily.   Psychiatric/Behavioral: Negative for confusion.       Past Medical History:   Diagnosis Date    Allergy     Anxiety     Asthma     BRCA2 positive     Depression     Headache     Hx of psychiatric care     Panic disorder     Psychiatric problem     Therapy      Past Surgical History:   Procedure Laterality Date    COLPOSCOPY      KNEE SURGERY       Family History   Problem Relation Age of Onset    Breast cancer Maternal Aunt     BRCA 1/2 Mother     Ovarian cancer Maternal Grandmother     Anxiety disorder Maternal Grandmother     Depression Maternal Grandmother     Depression Father     Anxiety disorder Father     Depression Brother     Anxiety disorder Brother     Anxiety disorder Maternal Grandfather     Depression Maternal Grandfather      Anxiety disorder Paternal Grandfather     Depression Paternal Grandfather     Anxiety disorder Paternal Grandmother     Depression Paternal Grandmother     Colon cancer Neg Hx      Social History     Socioeconomic History    Marital status: Single     Spouse name: Not on file    Number of children: Not on file    Years of education: Not on file    Highest education level: Not on file   Occupational History    Not on file   Social Needs    Financial resource strain: Not on file    Food insecurity:     Worry: Not on file     Inability: Not on file    Transportation needs:     Medical: Not on file     Non-medical: Not on file   Tobacco Use    Smoking status: Never Smoker    Smokeless tobacco: Never Used   Substance and Sexual Activity    Alcohol use: Yes     Comment: socially    Drug use: Not Currently     Frequency: 1.0 times per week     Types: Marijuana     Comment: 0nce per week or less    Sexual activity: Yes     Partners: Male     Birth control/protection: OCP   Lifestyle    Physical activity:     Days per week: Not on file     Minutes per session: Not on file    Stress: Not on file   Relationships    Social connections:     Talks on phone: Not on file     Gets together: Not on file     Attends Sabianism service: Not on file     Active member of club or organization: Not on file     Attends meetings of clubs or organizations: Not on file     Relationship status: Not on file   Other Topics Concern    Patient feels they ought to cut down on drinking/drug use Not Asked    Patient annoyed by others criticizing their drinking/drug use Not Asked    Patient has felt bad or guilty about drinking/drug use Not Asked    Patient has had a drink/used drugs as an eye opener in the AM Not Asked   Social History Narrative    Not on file     Current Outpatient Medications   Medication Sig    albuterol (ACCUNEB) 1.25 mg/3 mL Nebu Take 3 mLs (1.25 mg total) by nebulization every 6 (six) hours as  needed. Rescue    albuterol (PROVENTIL/VENTOLIN HFA) 90 mcg/actuation inhaler Inhale 2 puffs into the lungs every 6 (six) hours as needed for Wheezing. Rescue    cetirizine (ZYRTEC) 10 MG tablet Take 10 mg by mouth once daily.    fluticasone propionate (FLONASE) 50 mcg/actuation nasal spray 2 sprays (100 mcg total) by Each Nostril route 2 (two) times daily.    fluticasone-salmeterol diskus inhaler 500-50 mcg Inhale 1 puff into the lungs 2 (two) times daily. Controller    MARLISSA, 28, 0.15-0.03 mg per tablet Take 1 tablet by mouth once daily.    predniSONE (DELTASONE) 20 MG tablet 3 tablets a day for 5 days, then 1 tablet a day for 3 days, then stop.    azelastine (ASTELIN) 137 mcg (0.1 %) nasal spray 1 spray (137 mcg total) by Nasal route 2 (two) times daily.    FLUoxetine 40 MG capsule Take 2 capsules (80 mg total) by mouth once daily. (Patient taking differently: Take 40 mg by mouth once daily. )     No current facility-administered medications for this visit.      Review of patient's allergies indicates:  No Known Allergies    Objective:   Physical Exam:   Constitutional: She is oriented to person, place, and time. She appears well-developed and well-nourished.    HENT:   Head: Normocephalic and atraumatic.    Eyes: Pupils are equal, round, and reactive to light. EOM are normal.    Neck: Normal range of motion. Neck supple. No thyromegaly present.    Cardiovascular: Normal rate, regular rhythm and intact distal pulses.     Pulmonary/Chest: Effort normal and breath sounds normal. No respiratory distress. She has no wheezes.        Abdominal: Soft. Bowel sounds are normal. She exhibits no distension, no ascites and no mass. There is no tenderness.             Musculoskeletal: Normal range of motion and moves all extremeties.      Lymphadenopathy:     She has no cervical adenopathy.        Right: No supraclavicular adenopathy present.        Left: No supraclavicular adenopathy present.    Neurological: She  is alert and oriented to person, place, and time.    Skin: Skin is warm and dry. No rash noted.    Psychiatric: She has a normal mood and affect.       Assessment:     1. BRCA2 positive        Plan:     Orders Placed This Encounter   Procedures            Patient was counseled today on hereditary breast and ovarian cancer syndrome. The lifetime risk for ovarian cancer is about 15 to 20 percent for BRCA2 mutation carriers. Studies have demonstrated that bilateral salpingo-oophorectomy reduces ovarian cancer risk and mortality by approximately 90 percent. Major recommendations for unaffected female BRCA2 mutation carriers is RRBSO typically between age 40-45 (delayed as compared to BRCA1 carriers) and when childbearing is completed. For mutation carriers who have not undergone RRBSO we recommend ovarian cancer screening with concurrent TVUS and  q6 months beginning at age 30 or 5 to 10 years before the earliest age of first diagnosis of ovarian cancer in the family. However, we acknowledged and discussed the limitations of screening for ovarian cancer.     TVUS and  baseline  RTC 6 months for follow up   OCPs for contraception

## 2020-07-20 ENCOUNTER — TELEPHONE (OUTPATIENT)
Dept: SURGERY | Facility: CLINIC | Age: 30
End: 2020-07-20

## 2020-07-20 NOTE — TELEPHONE ENCOUNTER
Contacted patient to reschedule mammogram and annual visit with Dr. Dillon. Patient rescheduled for MMG on 8/27 at 0830 and f/u visit on 8/27 at 0845. Both are located at [facility]. Location, date, and times reviewed. Patient verbalized understanding.

## 2020-08-27 ENCOUNTER — HOSPITAL ENCOUNTER (OUTPATIENT)
Dept: RADIOLOGY | Facility: HOSPITAL | Age: 30
Discharge: HOME | End: 2020-08-27
Attending: SURGERY
Payer: COMMERCIAL

## 2020-08-27 ENCOUNTER — OFFICE VISIT (OUTPATIENT)
Dept: SURGERY | Facility: CLINIC | Age: 30
End: 2020-08-27
Payer: COMMERCIAL

## 2020-08-27 VITALS
SYSTOLIC BLOOD PRESSURE: 111 MMHG | BODY MASS INDEX: 20.09 KG/M2 | DIASTOLIC BLOOD PRESSURE: 66 MMHG | HEIGHT: 66 IN | HEART RATE: 68 BPM | WEIGHT: 125 LBS

## 2020-08-27 DIAGNOSIS — Z91.89 AT HIGH RISK FOR BREAST CANCER: Primary | ICD-10-CM

## 2020-08-27 DIAGNOSIS — Z15.01 BRCA2 POSITIVE: ICD-10-CM

## 2020-08-27 DIAGNOSIS — Z91.89 AT HIGH RISK FOR BREAST CANCER: ICD-10-CM

## 2020-08-27 DIAGNOSIS — Z12.31 SCREENING MAMMOGRAM, ENCOUNTER FOR: ICD-10-CM

## 2020-08-27 DIAGNOSIS — Z15.09 BRCA2 POSITIVE: ICD-10-CM

## 2020-08-27 DIAGNOSIS — Z80.3 FAMILY HISTORY OF BREAST CANCER: ICD-10-CM

## 2020-08-27 PROCEDURE — 99999 PR PBB SHADOW E&M-EST. PATIENT-LVL IV: CPT | Mod: PBBFAC,,, | Performed by: SURGERY

## 2020-08-27 PROCEDURE — 77063 MAMMO DIGITAL SCREENING BILAT WITH TOMOSYNTHESIS_CAD: ICD-10-PCS | Mod: 26,,, | Performed by: RADIOLOGY

## 2020-08-27 PROCEDURE — 77067 SCR MAMMO BI INCL CAD: CPT | Mod: TC

## 2020-08-27 PROCEDURE — 77067 SCR MAMMO BI INCL CAD: CPT | Mod: 26,,, | Performed by: RADIOLOGY

## 2020-08-27 PROCEDURE — 77067 MAMMO DIGITAL SCREENING BILAT WITH TOMOSYNTHESIS_CAD: ICD-10-PCS | Mod: 26,,, | Performed by: RADIOLOGY

## 2020-08-27 PROCEDURE — 99213 PR OFFICE/OUTPT VISIT, EST, LEVL III, 20-29 MIN: ICD-10-PCS | Mod: S$GLB,,, | Performed by: SURGERY

## 2020-08-27 PROCEDURE — 99213 OFFICE O/P EST LOW 20 MIN: CPT | Mod: S$GLB,,, | Performed by: SURGERY

## 2020-08-27 PROCEDURE — 77063 BREAST TOMOSYNTHESIS BI: CPT | Mod: 26,,, | Performed by: RADIOLOGY

## 2020-08-27 PROCEDURE — 99999 PR PBB SHADOW E&M-EST. PATIENT-LVL IV: ICD-10-PCS | Mod: PBBFAC,,, | Performed by: SURGERY

## 2020-08-27 NOTE — PROGRESS NOTES
[facility]  08/27/20    Patient ID: Haley Henriquez is a 30 y.o. female.    Chief Complaint: Follow-up (Annual F/U )      Subjective:     HPI:  Mrs. Henriquez is a 30 year old premenopausal  female who returns to clinic for annual follow up. She is BRCA2 + and has been getting alternating yearly breast MRI and mammogram.   Most recent MRI from 1/22/20 was normal (BI-RADS 1). Her most recent mammogram was performed today.     Family history is significant for maternal aunt with breast cancer in her 50s, and maternal grandmother with ovarian cancer. She also has maternal and paternal great aunts who had breast cancer, with youngest diagnosis at 29. Her mother is also BRCA2 + and had a bilateral prophylactic mastectomy at 55. She is currently well.     Age of menarche was 12. She is still on the OCP and cycles are regular. She is G0 and desires to have children and breastfeed in the future she is not ready for prophylactic mastectomy.     She is currently seeing Dr. Cullen for regular OBGYN care. She has seen Dr. Clark for gynecology oncology consult and has had normal CA-125 (01/15/20) and TVUS results (01/20/20). She plans to continue to see Dr. Clark regularly for follow-up.       Review of Systems   Constitutional: Negative for activity change, appetite change, fatigue, fever and unexpected weight change.   HENT: Negative for postnasal drip and sinus pain.    Respiratory: Negative for cough, shortness of breath and wheezing.    Cardiovascular: Negative for chest pain and palpitations.   Gastrointestinal: Negative for abdominal distention, abdominal pain, diarrhea and nausea.   Genitourinary: Negative for difficulty urinating and dysuria.   Musculoskeletal: Negative for arthralgias and neck pain.   Skin: Negative for color change and wound.   Neurological: Negative for tremors, weakness and headaches.   Hematological: Does not bruise/bleed easily.   Psychiatric/Behavioral: Negative for agitation.        Current Outpatient Medications   Medication Sig Dispense Refill    albuterol (ACCUNEB) 1.25 mg/3 mL Nebu Take 3 mLs (1.25 mg total) by nebulization every 6 (six) hours as needed. Rescue 60 vial 1    albuterol (PROVENTIL/VENTOLIN HFA) 90 mcg/actuation inhaler Inhale 2 puffs into the lungs every 6 (six) hours as needed for Wheezing. Rescue 18 g 3    cetirizine (ZYRTEC) 10 MG tablet Take 10 mg by mouth once daily.      fluticasone propionate (FLONASE) 50 mcg/actuation nasal spray 2 sprays (100 mcg total) by Each Nostril route 2 (two) times daily. 2 Bottle 5    fluticasone-salmeterol diskus inhaler 500-50 mcg Inhale 1 puff into the lungs 2 (two) times daily. Controller 60 each 11    MARLISSA, 28, 0.15-0.03 mg per tablet Take 1 tablet by mouth once daily. 84 tablet 3    azelastine (ASTELIN) 137 mcg (0.1 %) nasal spray 1 spray (137 mcg total) by Nasal route 2 (two) times daily. 30 mL 3    FLUoxetine 40 MG capsule Take 2 capsules (80 mg total) by mouth once daily. (Patient taking differently: Take 40 mg by mouth once daily. ) 60 capsule 3    predniSONE (DELTASONE) 20 MG tablet 3 tablets a day for 5 days, then 1 tablet a day for 3 days, then stop. 18 tablet 0     No current facility-administered medications for this visit.        Review of patient's allergies indicates:  No Known Allergies    Past Medical History:   Diagnosis Date    Allergy     Anxiety     Asthma     BRCA2 positive     Depression     Headache     Hx of psychiatric care     Panic disorder     Psychiatric problem     Therapy        Past Surgical History:   Procedure Laterality Date    COLPOSCOPY      KNEE SURGERY         Family History   Problem Relation Age of Onset    Breast cancer Maternal Aunt     BRCA 1/2 Mother     Ovarian cancer Maternal Grandmother     Anxiety disorder Maternal Grandmother     Depression Maternal Grandmother     Depression Father     Anxiety disorder Father     Depression Brother     Anxiety  disorder Brother     Anxiety disorder Maternal Grandfather     Depression Maternal Grandfather     Anxiety disorder Paternal Grandfather     Depression Paternal Grandfather     Anxiety disorder Paternal Grandmother     Depression Paternal Grandmother     Colon cancer Neg Hx        Social History     Socioeconomic History    Marital status: Single     Spouse name: Not on file    Number of children: Not on file    Years of education: Not on file    Highest education level: Not on file   Occupational History    Not on file   Social Needs    Financial resource strain: Not on file    Food insecurity     Worry: Not on file     Inability: Not on file    Transportation needs     Medical: Not on file     Non-medical: Not on file   Tobacco Use    Smoking status: Never Smoker    Smokeless tobacco: Never Used   Substance and Sexual Activity    Alcohol use: Yes     Comment: socially    Drug use: Not Currently     Frequency: 1.0 times per week     Types: Marijuana     Comment: 0nce per week or less    Sexual activity: Yes     Partners: Male     Birth control/protection: OCP   Lifestyle    Physical activity     Days per week: Not on file     Minutes per session: Not on file    Stress: Not on file   Relationships    Social connections     Talks on phone: Not on file     Gets together: Not on file     Attends Jehovah's witness service: Not on file     Active member of club or organization: Not on file     Attends meetings of clubs or organizations: Not on file     Relationship status: Not on file   Other Topics Concern    Patient feels they ought to cut down on drinking/drug use Not Asked    Patient annoyed by others criticizing their drinking/drug use Not Asked    Patient has felt bad or guilty about drinking/drug use Not Asked    Patient has had a drink/used drugs as an eye opener in the AM Not Asked   Social History Narrative    Not on file       Objective:    Vitals:    08/27/20 0845   BP: 111/66   Pulse: 68      Physical Exam   Pulmonary/Chest: She exhibits no mass, no tenderness and no laceration. Right breast exhibits no inverted nipple, no mass, no nipple discharge, no skin change and no tenderness. Left breast exhibits no inverted nipple, no mass, no nipple discharge, no skin change and no tenderness. No breast swelling or bleeding. Breasts are symmetrical.     Her bilateral clinical breast exam is within normal limits with no suspicious masses nodule density skin changes or lymphadenopathy.       Assessment & Plan:         Ms. Henriquez is a 30 year old  female who presents for annual follow up as she is BRCA2 + and has significant family history stated above. She is highly motivated to continue high risk screening for both breast and ovarian risk. She still desires to have children so she is not currently interested in prophylactic mastectomy or oopherectomy. Her mammogram from today appears normal. She is noted to have dense breasts on imaging.     Continue high-risk screenin) Breast screening MRI due 2021, mammogram 2021.   2) Yearly clinical breast exams here with mid-level [facility] providers.  3) Regular follow-ups with Dr. Clark for TVUS and CA-125 for ovarian risk - per previous note from her office, visits should occur every 6 months.     Betty Mcneill  MS3    I have personally taken the history and examined this patient, and I agree with the history, physical exam, assessment, and plan as written and outlined and stated above per the medical student.  B CBE today WNL  Imaging reviewed.  No official report issued yet as of the time of the clinic encounter.

## 2021-01-14 ENCOUNTER — CLINICAL SUPPORT (OUTPATIENT)
Dept: URGENT CARE | Facility: CLINIC | Age: 31
End: 2021-01-14
Payer: COMMERCIAL

## 2021-01-14 ENCOUNTER — OFFICE VISIT (OUTPATIENT)
Dept: URGENT CARE | Facility: CLINIC | Age: 31
End: 2021-01-14
Payer: COMMERCIAL

## 2021-01-14 VITALS
HEART RATE: 80 BPM | TEMPERATURE: 98 F | OXYGEN SATURATION: 98 % | RESPIRATION RATE: 18 BRPM | SYSTOLIC BLOOD PRESSURE: 124 MMHG | DIASTOLIC BLOOD PRESSURE: 74 MMHG

## 2021-01-14 VITALS — TEMPERATURE: 97 F | HEART RATE: 85 BPM | OXYGEN SATURATION: 99 %

## 2021-01-14 DIAGNOSIS — J02.0 STREP PHARYNGITIS: Primary | ICD-10-CM

## 2021-01-14 DIAGNOSIS — J02.9 SORE THROAT: ICD-10-CM

## 2021-01-14 DIAGNOSIS — Z11.59 SCREENING FOR VIRAL DISEASE: Primary | ICD-10-CM

## 2021-01-14 LAB
CTP QC/QA: YES
CTP QC/QA: YES
MOLECULAR STREP A: POSITIVE
SARS-COV-2 RDRP RESP QL NAA+PROBE: NEGATIVE

## 2021-01-14 PROCEDURE — U0002 COVID-19 LAB TEST NON-CDC: HCPCS | Mod: QW,S$GLB,, | Performed by: NURSE PRACTITIONER

## 2021-01-14 PROCEDURE — 87651 POCT STREP A MOLECULAR: ICD-10-PCS | Mod: QW,S$GLB,, | Performed by: FAMILY MEDICINE

## 2021-01-14 PROCEDURE — 99213 OFFICE O/P EST LOW 20 MIN: CPT | Mod: S$GLB,,, | Performed by: FAMILY MEDICINE

## 2021-01-14 PROCEDURE — U0002: ICD-10-PCS | Mod: QW,S$GLB,, | Performed by: NURSE PRACTITIONER

## 2021-01-14 PROCEDURE — 99213 PR OFFICE/OUTPT VISIT, EST, LEVL III, 20-29 MIN: ICD-10-PCS | Mod: S$GLB,,, | Performed by: FAMILY MEDICINE

## 2021-01-14 PROCEDURE — 87651 STREP A DNA AMP PROBE: CPT | Mod: QW,S$GLB,, | Performed by: FAMILY MEDICINE

## 2021-01-14 RX ORDER — AMOXICILLIN 500 MG/1
1000 TABLET, FILM COATED ORAL EVERY 12 HOURS
Qty: 40 TABLET | Refills: 0 | Status: SHIPPED | OUTPATIENT
Start: 2021-01-14 | End: 2021-01-24

## 2021-01-27 ENCOUNTER — OFFICE VISIT (OUTPATIENT)
Dept: URGENT CARE | Facility: CLINIC | Age: 31
End: 2021-01-27
Payer: COMMERCIAL

## 2021-01-27 VITALS
WEIGHT: 125 LBS | OXYGEN SATURATION: 98 % | SYSTOLIC BLOOD PRESSURE: 96 MMHG | TEMPERATURE: 98 F | BODY MASS INDEX: 20.09 KG/M2 | HEIGHT: 66 IN | DIASTOLIC BLOOD PRESSURE: 68 MMHG | RESPIRATION RATE: 20 BRPM | HEART RATE: 88 BPM

## 2021-01-27 DIAGNOSIS — J02.0 STREP PHARYNGITIS: Primary | ICD-10-CM

## 2021-01-27 DIAGNOSIS — J02.9 SORE THROAT: ICD-10-CM

## 2021-01-27 LAB
CTP QC/QA: YES
MOLECULAR STREP A: POSITIVE

## 2021-01-27 PROCEDURE — 87651 POCT STREP A MOLECULAR: ICD-10-PCS | Mod: QW,S$GLB,, | Performed by: NURSE PRACTITIONER

## 2021-01-27 PROCEDURE — 99214 OFFICE O/P EST MOD 30 MIN: CPT | Mod: S$GLB,,, | Performed by: NURSE PRACTITIONER

## 2021-01-27 PROCEDURE — 99214 PR OFFICE/OUTPT VISIT, EST, LEVL IV, 30-39 MIN: ICD-10-PCS | Mod: S$GLB,,, | Performed by: NURSE PRACTITIONER

## 2021-01-27 PROCEDURE — 87651 STREP A DNA AMP PROBE: CPT | Mod: QW,S$GLB,, | Performed by: NURSE PRACTITIONER

## 2021-01-27 RX ORDER — CEPHALEXIN 500 MG/1
500 CAPSULE ORAL EVERY 12 HOURS
Qty: 20 CAPSULE | Refills: 0 | Status: SHIPPED | OUTPATIENT
Start: 2021-01-27 | End: 2021-01-27

## 2021-01-27 RX ORDER — AMOXICILLIN AND CLAVULANATE POTASSIUM 875; 125 MG/1; MG/1
1 TABLET, FILM COATED ORAL 2 TIMES DAILY
Qty: 20 TABLET | Refills: 0 | Status: SHIPPED | OUTPATIENT
Start: 2021-01-27 | End: 2021-02-06

## 2021-02-01 ENCOUNTER — HOSPITAL ENCOUNTER (OUTPATIENT)
Dept: RADIOLOGY | Facility: HOSPITAL | Age: 31
Discharge: HOME | End: 2021-02-01
Attending: SURGERY
Payer: COMMERCIAL

## 2021-02-01 DIAGNOSIS — Z91.89 AT HIGH RISK FOR BREAST CANCER: ICD-10-CM

## 2021-02-01 PROCEDURE — 77049 MRI BREAST C-+ W/CAD BI: CPT | Mod: TC

## 2021-02-01 PROCEDURE — 77049 MRI BREAST C-+ W/CAD BI: CPT | Mod: 26,,, | Performed by: RADIOLOGY

## 2021-02-01 PROCEDURE — 77049 MRI BREAST W/WO CONTRAST, W/CAD, BILATERAL: ICD-10-PCS | Mod: 26,,, | Performed by: RADIOLOGY

## 2021-02-01 PROCEDURE — A9577 INJ MULTIHANCE: HCPCS | Performed by: SURGERY

## 2021-02-01 PROCEDURE — 25500020 PHARM REV CODE 255: Performed by: SURGERY

## 2021-02-01 RX ADMIN — GADOBENATE DIMEGLUMINE 12 ML: 529 INJECTION, SOLUTION INTRAVENOUS at 09:02

## 2021-02-02 ENCOUNTER — OFFICE VISIT (OUTPATIENT)
Dept: OTOLARYNGOLOGY | Facility: CLINIC | Age: 31
End: 2021-02-02
Payer: COMMERCIAL

## 2021-02-02 DIAGNOSIS — J02.0 ACUTE STREPTOCOCCAL PHARYNGITIS: Primary | ICD-10-CM

## 2021-02-02 PROCEDURE — 99999 PR PBB SHADOW E&M-EST. PATIENT-LVL III: ICD-10-PCS | Mod: PBBFAC,,, | Performed by: NURSE PRACTITIONER

## 2021-02-02 PROCEDURE — 99214 PR OFFICE/OUTPT VISIT, EST, LEVL IV, 30-39 MIN: ICD-10-PCS | Mod: S$GLB,,, | Performed by: NURSE PRACTITIONER

## 2021-02-02 PROCEDURE — 99999 PR PBB SHADOW E&M-EST. PATIENT-LVL III: CPT | Mod: PBBFAC,,, | Performed by: NURSE PRACTITIONER

## 2021-02-02 PROCEDURE — 99214 OFFICE O/P EST MOD 30 MIN: CPT | Mod: S$GLB,,, | Performed by: NURSE PRACTITIONER

## 2021-02-09 ENCOUNTER — OFFICE VISIT (OUTPATIENT)
Dept: URGENT CARE | Facility: CLINIC | Age: 31
End: 2021-02-09
Payer: COMMERCIAL

## 2021-02-09 VITALS
SYSTOLIC BLOOD PRESSURE: 106 MMHG | TEMPERATURE: 98 F | OXYGEN SATURATION: 98 % | HEIGHT: 66 IN | HEART RATE: 91 BPM | WEIGHT: 130 LBS | DIASTOLIC BLOOD PRESSURE: 56 MMHG | RESPIRATION RATE: 18 BRPM | BODY MASS INDEX: 20.89 KG/M2

## 2021-02-09 DIAGNOSIS — J02.0 STREP PHARYNGITIS: Primary | ICD-10-CM

## 2021-02-09 DIAGNOSIS — J02.9 SORE THROAT: ICD-10-CM

## 2021-02-09 DIAGNOSIS — J02.0 ACUTE STREPTOCOCCAL PHARYNGITIS: Primary | ICD-10-CM

## 2021-02-09 LAB
CTP QC/QA: YES
MOLECULAR STREP A: POSITIVE

## 2021-02-09 PROCEDURE — 99214 PR OFFICE/OUTPT VISIT, EST, LEVL IV, 30-39 MIN: ICD-10-PCS | Mod: S$GLB,,, | Performed by: NURSE PRACTITIONER

## 2021-02-09 PROCEDURE — 87651 POCT STREP A MOLECULAR: ICD-10-PCS | Mod: QW,S$GLB,, | Performed by: NURSE PRACTITIONER

## 2021-02-09 PROCEDURE — 99214 OFFICE O/P EST MOD 30 MIN: CPT | Mod: S$GLB,,, | Performed by: NURSE PRACTITIONER

## 2021-02-09 PROCEDURE — 87651 STREP A DNA AMP PROBE: CPT | Mod: QW,S$GLB,, | Performed by: NURSE PRACTITIONER

## 2021-02-09 RX ORDER — CEPHALEXIN 500 MG/1
500 CAPSULE ORAL EVERY 8 HOURS
Qty: 42 CAPSULE | Refills: 0 | Status: SHIPPED | OUTPATIENT
Start: 2021-02-09 | End: 2021-02-23

## 2021-02-09 RX ORDER — AMOXICILLIN 500 MG/1
500 TABLET, FILM COATED ORAL EVERY 12 HOURS
Qty: 20 TABLET | Refills: 0 | Status: SHIPPED | OUTPATIENT
Start: 2021-02-09 | End: 2021-02-09 | Stop reason: ALTCHOICE

## 2021-03-10 ENCOUNTER — PATIENT MESSAGE (OUTPATIENT)
Dept: ALLERGY | Facility: CLINIC | Age: 31
End: 2021-03-10

## 2021-03-25 ENCOUNTER — OFFICE VISIT (OUTPATIENT)
Dept: ALLERGY | Facility: CLINIC | Age: 31
End: 2021-03-25
Payer: COMMERCIAL

## 2021-03-25 VITALS — BODY MASS INDEX: 20.23 KG/M2 | HEIGHT: 66 IN | WEIGHT: 125.88 LBS

## 2021-03-25 DIAGNOSIS — J45.40 MODERATE PERSISTENT ASTHMA, UNSPECIFIED WHETHER COMPLICATED: Primary | ICD-10-CM

## 2021-03-25 DIAGNOSIS — J31.0 MIXED RHINITIS: ICD-10-CM

## 2021-03-25 DIAGNOSIS — J30.0 VASOMOTOR RHINITIS: ICD-10-CM

## 2021-03-25 DIAGNOSIS — J30.9 CHRONIC ALLERGIC RHINITIS: ICD-10-CM

## 2021-03-25 PROCEDURE — 99999 PR PBB SHADOW E&M-EST. PATIENT-LVL III: ICD-10-PCS | Mod: PBBFAC,,, | Performed by: STUDENT IN AN ORGANIZED HEALTH CARE EDUCATION/TRAINING PROGRAM

## 2021-03-25 PROCEDURE — 99999 PR PBB SHADOW E&M-EST. PATIENT-LVL III: CPT | Mod: PBBFAC,,, | Performed by: STUDENT IN AN ORGANIZED HEALTH CARE EDUCATION/TRAINING PROGRAM

## 2021-03-25 PROCEDURE — 99214 PR OFFICE/OUTPT VISIT, EST, LEVL IV, 30-39 MIN: ICD-10-PCS | Mod: S$GLB,,, | Performed by: STUDENT IN AN ORGANIZED HEALTH CARE EDUCATION/TRAINING PROGRAM

## 2021-03-25 PROCEDURE — 99214 OFFICE O/P EST MOD 30 MIN: CPT | Mod: S$GLB,,, | Performed by: STUDENT IN AN ORGANIZED HEALTH CARE EDUCATION/TRAINING PROGRAM

## 2021-03-25 RX ORDER — CETIRIZINE HYDROCHLORIDE 10 MG/1
TABLET ORAL
Qty: 100 TABLET | Refills: 1 | Status: SHIPPED | OUTPATIENT
Start: 2021-03-25 | End: 2021-06-08

## 2021-03-25 RX ORDER — FLUTICASONE PROPIONATE AND SALMETEROL 250; 50 UG/1; UG/1
1 POWDER RESPIRATORY (INHALATION) 2 TIMES DAILY
Qty: 180 EACH | Refills: 3 | Status: SHIPPED | OUTPATIENT
Start: 2021-03-25 | End: 2021-04-28

## 2021-03-25 RX ORDER — FLUTICASONE PROPIONATE 50 MCG
2 SPRAY, SUSPENSION (ML) NASAL 2 TIMES DAILY
Qty: 31.6 ML | Refills: 5 | Status: SHIPPED | OUTPATIENT
Start: 2021-03-25 | End: 2022-05-04

## 2021-03-25 RX ORDER — ALBUTEROL SULFATE 90 UG/1
2 AEROSOL, METERED RESPIRATORY (INHALATION) EVERY 6 HOURS PRN
Qty: 18 G | Refills: 3 | Status: SHIPPED | OUTPATIENT
Start: 2021-03-25 | End: 2024-04-02

## 2021-04-26 ENCOUNTER — PATIENT MESSAGE (OUTPATIENT)
Dept: RESEARCH | Facility: HOSPITAL | Age: 31
End: 2021-04-26

## 2021-04-28 ENCOUNTER — OFFICE VISIT (OUTPATIENT)
Dept: UROGYNECOLOGY | Facility: CLINIC | Age: 31
End: 2021-04-28
Payer: COMMERCIAL

## 2021-04-28 VITALS
HEART RATE: 69 BPM | BODY MASS INDEX: 19.9 KG/M2 | WEIGHT: 123.81 LBS | DIASTOLIC BLOOD PRESSURE: 74 MMHG | SYSTOLIC BLOOD PRESSURE: 122 MMHG | HEIGHT: 66 IN

## 2021-04-28 DIAGNOSIS — N90.89 VULVAR LESION: Primary | ICD-10-CM

## 2021-04-28 DIAGNOSIS — S30.814A ABRASION OF VAGINA AND VULVA, INITIAL ENCOUNTER: ICD-10-CM

## 2021-04-28 PROCEDURE — 99214 PR OFFICE/OUTPT VISIT, EST, LEVL IV, 30-39 MIN: ICD-10-PCS | Mod: S$GLB,,, | Performed by: NURSE PRACTITIONER

## 2021-04-28 PROCEDURE — 99999 PR PBB SHADOW E&M-EST. PATIENT-LVL III: ICD-10-PCS | Mod: PBBFAC,,, | Performed by: NURSE PRACTITIONER

## 2021-04-28 PROCEDURE — 87529 HSV DNA AMP PROBE: CPT | Performed by: NURSE PRACTITIONER

## 2021-04-28 PROCEDURE — 99214 OFFICE O/P EST MOD 30 MIN: CPT | Mod: S$GLB,,, | Performed by: NURSE PRACTITIONER

## 2021-04-28 PROCEDURE — 99999 PR PBB SHADOW E&M-EST. PATIENT-LVL III: CPT | Mod: PBBFAC,,, | Performed by: NURSE PRACTITIONER

## 2021-04-28 RX ORDER — SERTRALINE HYDROCHLORIDE 50 MG/1
100 TABLET, FILM COATED ORAL
COMMUNITY
Start: 2021-03-26 | End: 2022-05-04

## 2021-04-28 RX ORDER — CLOBETASOL PROPIONATE 0.5 MG/G
OINTMENT TOPICAL
Qty: 30 G | Refills: 3 | Status: SHIPPED | OUTPATIENT
Start: 2021-04-28 | End: 2022-05-04

## 2021-04-28 RX ORDER — VALACYCLOVIR HYDROCHLORIDE 1 G/1
1000 TABLET, FILM COATED ORAL EVERY 12 HOURS
Qty: 20 TABLET | Refills: 0 | Status: SHIPPED | OUTPATIENT
Start: 2021-04-28 | End: 2022-05-04

## 2021-04-30 ENCOUNTER — PATIENT MESSAGE (OUTPATIENT)
Dept: UROGYNECOLOGY | Facility: CLINIC | Age: 31
End: 2021-04-30

## 2021-04-30 LAB
HSV1 DNA SPEC QL NAA+PROBE: POSITIVE
HSV2 DNA SPEC QL NAA+PROBE: NEGATIVE
SPECIMEN SOURCE: ABNORMAL

## 2021-05-10 ENCOUNTER — PATIENT MESSAGE (OUTPATIENT)
Dept: UROGYNECOLOGY | Facility: CLINIC | Age: 31
End: 2021-05-10

## 2021-06-03 DIAGNOSIS — S30.814A ABRASION OF VAGINA AND VULVA, INITIAL ENCOUNTER: Primary | ICD-10-CM

## 2021-06-03 DIAGNOSIS — N90.89 VULVAR LESION: ICD-10-CM

## 2021-06-07 RX ORDER — CONJUGATED ESTROGENS 0.62 MG/G
CREAM VAGINAL
Qty: 30 G | Refills: 3 | Status: SHIPPED | OUTPATIENT
Start: 2021-06-07 | End: 2022-05-04

## 2021-07-08 ENCOUNTER — OFFICE VISIT (OUTPATIENT)
Dept: URGENT CARE | Facility: CLINIC | Age: 31
End: 2021-07-08
Payer: COMMERCIAL

## 2021-07-08 VITALS
DIASTOLIC BLOOD PRESSURE: 78 MMHG | RESPIRATION RATE: 18 BRPM | WEIGHT: 128 LBS | OXYGEN SATURATION: 96 % | TEMPERATURE: 98 F | BODY MASS INDEX: 20.57 KG/M2 | HEIGHT: 66 IN | SYSTOLIC BLOOD PRESSURE: 114 MMHG | HEART RATE: 65 BPM

## 2021-07-08 DIAGNOSIS — L30.8 PRURITIC DERMATITIS: Primary | ICD-10-CM

## 2021-07-08 DIAGNOSIS — T20.07XA: ICD-10-CM

## 2021-07-08 PROCEDURE — 99214 PR OFFICE/OUTPT VISIT, EST, LEVL IV, 30-39 MIN: ICD-10-PCS | Mod: 25,S$GLB,, | Performed by: NURSE PRACTITIONER

## 2021-07-08 PROCEDURE — 99214 OFFICE O/P EST MOD 30 MIN: CPT | Mod: 25,S$GLB,, | Performed by: NURSE PRACTITIONER

## 2021-07-08 PROCEDURE — 96372 THER/PROPH/DIAG INJ SC/IM: CPT | Mod: S$GLB,,, | Performed by: EMERGENCY MEDICINE

## 2021-07-08 PROCEDURE — 96372 PR INJECTION,THERAP/PROPH/DIAG2ST, IM OR SUBCUT: ICD-10-PCS | Mod: S$GLB,,, | Performed by: EMERGENCY MEDICINE

## 2021-07-08 RX ORDER — FAMOTIDINE 20 MG/1
20 TABLET, FILM COATED ORAL 2 TIMES DAILY
Qty: 60 TABLET | Refills: 0 | Status: SHIPPED | OUTPATIENT
Start: 2021-07-08 | End: 2022-05-04

## 2021-07-08 RX ORDER — DEXAMETHASONE SODIUM PHOSPHATE 100 MG/10ML
8 INJECTION INTRAMUSCULAR; INTRAVENOUS
Status: COMPLETED | OUTPATIENT
Start: 2021-07-08 | End: 2021-07-08

## 2021-07-08 RX ORDER — TRIAMCINOLONE ACETONIDE 1 MG/G
CREAM TOPICAL 2 TIMES DAILY
Qty: 15 G | Refills: 0 | Status: SHIPPED | OUTPATIENT
Start: 2021-07-08 | End: 2022-05-04

## 2021-07-08 RX ADMIN — DEXAMETHASONE SODIUM PHOSPHATE 8 MG: 100 INJECTION INTRAMUSCULAR; INTRAVENOUS at 10:07

## 2021-09-04 ENCOUNTER — PATIENT MESSAGE (OUTPATIENT)
Dept: OBSTETRICS AND GYNECOLOGY | Facility: CLINIC | Age: 31
End: 2021-09-04

## 2021-09-23 ENCOUNTER — OFFICE VISIT (OUTPATIENT)
Dept: OBSTETRICS AND GYNECOLOGY | Facility: CLINIC | Age: 31
End: 2021-09-23
Payer: COMMERCIAL

## 2021-09-23 VITALS — DIASTOLIC BLOOD PRESSURE: 75 MMHG | SYSTOLIC BLOOD PRESSURE: 125 MMHG | BODY MASS INDEX: 19.86 KG/M2 | WEIGHT: 123 LBS

## 2021-09-23 DIAGNOSIS — Z01.419 WOMEN'S ANNUAL ROUTINE GYNECOLOGICAL EXAMINATION: Primary | ICD-10-CM

## 2021-09-23 DIAGNOSIS — Z30.9 ENCOUNTER FOR CONTRACEPTIVE MANAGEMENT, UNSPECIFIED TYPE: ICD-10-CM

## 2021-09-23 PROCEDURE — 99395 PR PREVENTIVE VISIT,EST,18-39: ICD-10-PCS | Mod: S$GLB,,, | Performed by: NURSE PRACTITIONER

## 2021-09-23 PROCEDURE — 99395 PREV VISIT EST AGE 18-39: CPT | Mod: S$GLB,,, | Performed by: NURSE PRACTITIONER

## 2021-09-23 PROCEDURE — 99999 PR PBB SHADOW E&M-EST. PATIENT-LVL III: ICD-10-PCS | Mod: PBBFAC,,, | Performed by: NURSE PRACTITIONER

## 2021-09-23 PROCEDURE — 99999 PR PBB SHADOW E&M-EST. PATIENT-LVL III: CPT | Mod: PBBFAC,,, | Performed by: NURSE PRACTITIONER

## 2021-09-27 ENCOUNTER — PATIENT MESSAGE (OUTPATIENT)
Dept: OBSTETRICS AND GYNECOLOGY | Facility: CLINIC | Age: 31
End: 2021-09-27

## 2021-09-27 DIAGNOSIS — Z30.9 ENCOUNTER FOR CONTRACEPTIVE MANAGEMENT, UNSPECIFIED TYPE: Primary | ICD-10-CM

## 2021-10-04 ENCOUNTER — HOSPITAL ENCOUNTER (OUTPATIENT)
Dept: RADIOLOGY | Facility: HOSPITAL | Age: 31
Discharge: HOME | End: 2021-10-04
Attending: SURGERY
Payer: COMMERCIAL

## 2021-10-04 DIAGNOSIS — Z12.31 SCREENING MAMMOGRAM, ENCOUNTER FOR: ICD-10-CM

## 2021-10-04 PROCEDURE — 77063 BREAST TOMOSYNTHESIS BI: CPT | Mod: 26,,, | Performed by: RADIOLOGY

## 2021-10-04 PROCEDURE — 77063 MAMMO DIGITAL SCREENING BILAT WITH TOMOSYNTHESIS_CAD: ICD-10-PCS | Mod: 26,,, | Performed by: RADIOLOGY

## 2021-10-04 PROCEDURE — 77067 SCR MAMMO BI INCL CAD: CPT | Mod: 26,,, | Performed by: RADIOLOGY

## 2021-10-04 PROCEDURE — 77067 SCR MAMMO BI INCL CAD: CPT | Mod: TC

## 2021-10-04 PROCEDURE — 77067 MAMMO DIGITAL SCREENING BILAT WITH TOMOSYNTHESIS_CAD: ICD-10-PCS | Mod: 26,,, | Performed by: RADIOLOGY

## 2021-10-13 ENCOUNTER — CLINICAL SUPPORT (OUTPATIENT)
Dept: URGENT CARE | Facility: CLINIC | Age: 31
End: 2021-10-13
Payer: COMMERCIAL

## 2021-10-13 DIAGNOSIS — Z20.822 ENCOUNTER FOR LABORATORY TESTING FOR COVID-19 VIRUS: ICD-10-CM

## 2021-10-13 LAB — SARS-COV-2 RNA RESP QL NAA+PROBE: NOT DETECTED

## 2021-10-13 PROCEDURE — U0005 INFEC AGEN DETEC AMPLI PROBE: HCPCS | Performed by: NURSE PRACTITIONER

## 2021-10-13 PROCEDURE — U0003 INFECTIOUS AGENT DETECTION BY NUCLEIC ACID (DNA OR RNA); SEVERE ACUTE RESPIRATORY SYNDROME CORONAVIRUS 2 (SARS-COV-2) (CORONAVIRUS DISEASE [COVID-19]), AMPLIFIED PROBE TECHNIQUE, MAKING USE OF HIGH THROUGHPUT TECHNOLOGIES AS DESCRIBED BY CMS-2020-01-R: HCPCS | Performed by: NURSE PRACTITIONER

## 2021-10-26 ENCOUNTER — IMMUNIZATION (OUTPATIENT)
Dept: INTERNAL MEDICINE | Facility: CLINIC | Age: 31
End: 2021-10-26
Payer: COMMERCIAL

## 2021-10-26 DIAGNOSIS — Z23 NEED FOR VACCINATION: Primary | ICD-10-CM

## 2021-10-26 PROCEDURE — 91300 COVID-19, MRNA, LNP-S, PF, 30 MCG/0.3 ML DOSE VACCINE: CPT | Mod: PBBFAC | Performed by: INTERNAL MEDICINE

## 2021-10-26 PROCEDURE — 0003A COVID-19, MRNA, LNP-S, PF, 30 MCG/0.3 ML DOSE VACCINE: CPT | Mod: PBBFAC | Performed by: INTERNAL MEDICINE

## 2021-11-14 ENCOUNTER — OFFICE VISIT (OUTPATIENT)
Dept: URGENT CARE | Facility: CLINIC | Age: 31
End: 2021-11-14
Payer: COMMERCIAL

## 2021-11-14 VITALS
BODY MASS INDEX: 19.77 KG/M2 | TEMPERATURE: 99 F | RESPIRATION RATE: 18 BRPM | DIASTOLIC BLOOD PRESSURE: 67 MMHG | OXYGEN SATURATION: 97 % | HEIGHT: 66 IN | WEIGHT: 123 LBS | HEART RATE: 105 BPM | SYSTOLIC BLOOD PRESSURE: 117 MMHG

## 2021-11-14 DIAGNOSIS — R10.9 ABDOMINAL PAIN, UNSPECIFIED ABDOMINAL LOCATION: ICD-10-CM

## 2021-11-14 DIAGNOSIS — K59.00 CONSTIPATION, UNSPECIFIED CONSTIPATION TYPE: ICD-10-CM

## 2021-11-14 DIAGNOSIS — R10.9 FLANK PAIN: Primary | ICD-10-CM

## 2021-11-14 LAB
B-HCG UR QL: NEGATIVE
BILIRUB UR QL STRIP: NEGATIVE
CTP QC/QA: YES
GLUCOSE UR QL STRIP: NEGATIVE
KETONES UR QL STRIP: NEGATIVE
LEUKOCYTE ESTERASE UR QL STRIP: NEGATIVE
PH, POC UA: 6 (ref 5–8)
POC BLOOD, URINE: POSITIVE
POC NITRATES, URINE: NEGATIVE
PROT UR QL STRIP: NEGATIVE
SP GR UR STRIP: 1.01 (ref 1–1.03)
UROBILINOGEN UR STRIP-ACNC: NORMAL (ref 0.1–1.1)

## 2021-11-14 PROCEDURE — 99213 OFFICE O/P EST LOW 20 MIN: CPT | Mod: 25,S$GLB,, | Performed by: NURSE PRACTITIONER

## 2021-11-14 PROCEDURE — 74019 XR ABDOMEN FLAT AND ERECT: ICD-10-PCS | Mod: S$GLB,,, | Performed by: RADIOLOGY

## 2021-11-14 PROCEDURE — 99213 PR OFFICE/OUTPT VISIT, EST, LEVL III, 20-29 MIN: ICD-10-PCS | Mod: 25,S$GLB,, | Performed by: NURSE PRACTITIONER

## 2021-11-14 PROCEDURE — 74019 RADEX ABDOMEN 2 VIEWS: CPT | Mod: S$GLB,,, | Performed by: RADIOLOGY

## 2021-11-14 PROCEDURE — 81025 POCT URINE PREGNANCY: ICD-10-PCS | Mod: S$GLB,,, | Performed by: NURSE PRACTITIONER

## 2021-11-14 PROCEDURE — 81025 URINE PREGNANCY TEST: CPT | Mod: S$GLB,,, | Performed by: NURSE PRACTITIONER

## 2021-11-14 PROCEDURE — 81003 URINALYSIS AUTO W/O SCOPE: CPT | Mod: QW,S$GLB,, | Performed by: NURSE PRACTITIONER

## 2021-11-14 PROCEDURE — 81003 POCT URINALYSIS, DIPSTICK, AUTOMATED, W/O SCOPE: ICD-10-PCS | Mod: QW,S$GLB,, | Performed by: NURSE PRACTITIONER

## 2021-11-14 RX ORDER — OXCARBAZEPINE 150 MG/1
150 TABLET, FILM COATED ORAL 2 TIMES DAILY
COMMUNITY
Start: 2021-07-08 | End: 2022-05-04

## 2021-11-14 RX ORDER — ARIPIPRAZOLE 2 MG/1
2 TABLET ORAL DAILY
COMMUNITY
Start: 2021-07-01 | End: 2022-05-04

## 2021-11-14 RX ORDER — LAMOTRIGINE 25 MG/1
TABLET ORAL
COMMUNITY
Start: 2021-08-09 | End: 2022-05-04

## 2021-11-14 RX ORDER — POLYETHYLENE GLYCOL 3350 17 G/17G
17 POWDER, FOR SOLUTION ORAL DAILY
Qty: 119 G | Refills: 0 | Status: SHIPPED | OUTPATIENT
Start: 2021-11-14 | End: 2021-11-21

## 2022-01-12 ENCOUNTER — PATIENT MESSAGE (OUTPATIENT)
Dept: GYNECOLOGIC ONCOLOGY | Facility: CLINIC | Age: 32
End: 2022-01-12
Payer: COMMERCIAL

## 2022-01-13 ENCOUNTER — OFFICE VISIT (OUTPATIENT)
Dept: GYNECOLOGIC ONCOLOGY | Facility: CLINIC | Age: 32
End: 2022-01-13
Payer: COMMERCIAL

## 2022-01-13 VITALS
WEIGHT: 122.88 LBS | HEART RATE: 72 BPM | SYSTOLIC BLOOD PRESSURE: 120 MMHG | DIASTOLIC BLOOD PRESSURE: 69 MMHG | BODY MASS INDEX: 19.84 KG/M2

## 2022-01-13 DIAGNOSIS — Z15.09 BRCA2 POSITIVE: Primary | ICD-10-CM

## 2022-01-13 DIAGNOSIS — N92.4 EXCESSIVE BLEEDING IN PREMENOPAUSAL PERIOD: ICD-10-CM

## 2022-01-13 DIAGNOSIS — Z15.01 BRCA2 POSITIVE: Primary | ICD-10-CM

## 2022-01-13 DIAGNOSIS — K92.1 HEMATOCHEZIA: ICD-10-CM

## 2022-01-13 PROCEDURE — 99999 PR PBB SHADOW E&M-EST. PATIENT-LVL IV: CPT | Mod: PBBFAC,,, | Performed by: OBSTETRICS & GYNECOLOGY

## 2022-01-13 PROCEDURE — 99999 PR PBB SHADOW E&M-EST. PATIENT-LVL IV: ICD-10-PCS | Mod: PBBFAC,,, | Performed by: OBSTETRICS & GYNECOLOGY

## 2022-01-13 PROCEDURE — 99214 OFFICE O/P EST MOD 30 MIN: CPT | Mod: S$GLB,,, | Performed by: OBSTETRICS & GYNECOLOGY

## 2022-01-13 PROCEDURE — 99214 PR OFFICE/OUTPT VISIT, EST, LEVL IV, 30-39 MIN: ICD-10-PCS | Mod: S$GLB,,, | Performed by: OBSTETRICS & GYNECOLOGY

## 2022-01-14 ENCOUNTER — HOSPITAL ENCOUNTER (OUTPATIENT)
Dept: RADIOLOGY | Facility: OTHER | Age: 32
Discharge: HOME | End: 2022-01-14
Attending: OBSTETRICS & GYNECOLOGY
Payer: COMMERCIAL

## 2022-01-14 ENCOUNTER — PATIENT MESSAGE (OUTPATIENT)
Dept: GYNECOLOGIC ONCOLOGY | Facility: CLINIC | Age: 32
End: 2022-01-14
Payer: COMMERCIAL

## 2022-01-14 DIAGNOSIS — N92.4 EXCESSIVE BLEEDING IN PREMENOPAUSAL PERIOD: ICD-10-CM

## 2022-01-14 DIAGNOSIS — Z15.09 BRCA2 POSITIVE: ICD-10-CM

## 2022-01-14 DIAGNOSIS — Z15.01 BRCA2 POSITIVE: ICD-10-CM

## 2022-01-14 PROCEDURE — 76856 US EXAM PELVIC COMPLETE: CPT | Mod: 26,,, | Performed by: RADIOLOGY

## 2022-01-14 PROCEDURE — 76856 US PELVIS COMP WITH TRANSVAG NON-OB (XPD): ICD-10-PCS | Mod: 26,,, | Performed by: RADIOLOGY

## 2022-01-14 PROCEDURE — 76830 US PELVIS COMP WITH TRANSVAG NON-OB (XPD): ICD-10-PCS | Mod: 26,,, | Performed by: RADIOLOGY

## 2022-01-14 PROCEDURE — 76830 TRANSVAGINAL US NON-OB: CPT | Mod: TC

## 2022-01-14 PROCEDURE — 76830 TRANSVAGINAL US NON-OB: CPT | Mod: 26,,, | Performed by: RADIOLOGY

## 2022-01-18 ENCOUNTER — TELEPHONE (OUTPATIENT)
Dept: GYNECOLOGIC ONCOLOGY | Facility: CLINIC | Age: 32
End: 2022-01-18
Payer: COMMERCIAL

## 2022-01-18 DIAGNOSIS — N83.209 CYST OF OVARY, UNSPECIFIED LATERALITY: Primary | ICD-10-CM

## 2022-01-18 DIAGNOSIS — Z15.09 BRCA2 POSITIVE: ICD-10-CM

## 2022-01-18 DIAGNOSIS — Z15.01 BRCA2 POSITIVE: ICD-10-CM

## 2022-01-18 NOTE — TELEPHONE ENCOUNTER
----- Message from Judith Clark MD sent at 1/18/2022 12:48 PM CST -----  Regarding: schedule US in 6 weeks  Please schedule repeat pelvic US in 6 weeks. Orders in epic.   Thank you

## 2022-01-21 NOTE — PROGRESS NOTES
Subjective:      Patient ID: Haley Henriquez is a 31 y.o. female.    Chief Complaint: Vaginal Bleeding      HPI  Presents today for problem visit.   Endorses heavy irregular bleeding menstrual bleeding.   She did recently discontinue OCPs in September, for which she had started due to menometrorrhagia and contraception.   She also reports rectal bleeding.       Referral history:  Referred by Dr. Dillon for BRCA2 mutation and ovarian cancer risk reduction management.      Recently moved from [facility]. She was tested for BRCA after a positive test in her family in 2013. She has 1 aunt with breast cancer and mother with ovarian cancer.  Also a patient of Dr Oliva. P0 and desires fertility. Currently on OCPs for contraception.       12/2019 pap and HPV negative.   Review of Systems   Constitutional: Negative for appetite change, chills, fatigue and fever.   HENT: Negative for mouth sores.    Respiratory: Negative for cough and shortness of breath.    Cardiovascular: Negative for leg swelling.   Gastrointestinal: Negative for abdominal pain, blood in stool, constipation and diarrhea.   Endocrine: Negative for cold intolerance.   Genitourinary: Negative for dysuria and vaginal bleeding.   Musculoskeletal: Negative for myalgias.   Skin: Negative for rash.   Allergic/Immunologic: Negative.    Neurological: Negative for weakness and numbness.   Hematological: Negative for adenopathy. Does not bruise/bleed easily.   Psychiatric/Behavioral: Negative for confusion.       Objective:   Physical Exam:   Constitutional: She is oriented to person, place, and time. She appears well-developed and well-nourished.    HENT:   Head: Normocephalic and atraumatic.    Eyes: Pupils are equal, round, and reactive to light. EOM are normal.    Neck: No thyromegaly present.    Cardiovascular: Normal rate, regular rhythm and intact distal pulses.     Pulmonary/Chest: Effort normal and breath sounds normal. No respiratory distress. She  has no wheezes.        Abdominal: Soft. Bowel sounds are normal. She exhibits no distension, no ascites and no mass. There is no abdominal tenderness.     Genitourinary:    Vagina and uterus normal.      Pelvic exam was performed with patient supine.   There is no lesion on the right labia. There is no lesion on the left labia. Cervix is normal. Right adnexum displays no mass. Left adnexum displays no mass. Uterus is not enlarged and not fixed.    Genitourinary Comments: Normal exam.              Musculoskeletal: Normal range of motion and moves all extremeties.      Lymphadenopathy:     She has no cervical adenopathy. No inguinal adenopathy noted on the right or left side.        Right: No supraclavicular adenopathy present.        Left: No supraclavicular adenopathy present.    Neurological: She is alert and oriented to person, place, and time.    Skin: Skin is warm and dry. No rash noted.    Psychiatric: She has a normal mood and affect.       Assessment:     1. BRCA2 positive    2. Excessive bleeding in premenopausal period    3. Hematochezia        Plan:     Orders Placed This Encounter   Procedures    Ambulatory referral/consult to Gastroenterology     Normal exam findings. Will obtain pelvic US. Discussed resuming OCPs for regulation of menstruation however she declines at the moment. Further treatment planning pending US. Discussed q6 month follow up for BRCA mutation.     Referral to GI placed for hematochezia as well.     I spent approximately 30 minutes reviewing the available records and evaluating the patient, out of which over 50% of the time was spent face to face with the patient in counseling and coordinating this patient's care.

## 2022-02-09 ENCOUNTER — PATIENT MESSAGE (OUTPATIENT)
Dept: OBSTETRICS AND GYNECOLOGY | Facility: CLINIC | Age: 32
End: 2022-02-09
Payer: COMMERCIAL

## 2022-03-08 ENCOUNTER — HOSPITAL ENCOUNTER (OUTPATIENT)
Dept: RADIOLOGY | Facility: OTHER | Age: 32
Discharge: HOME | End: 2022-03-08
Attending: OBSTETRICS & GYNECOLOGY
Payer: COMMERCIAL

## 2022-03-08 DIAGNOSIS — Z15.09 BRCA2 POSITIVE: ICD-10-CM

## 2022-03-08 DIAGNOSIS — Z15.01 BRCA2 POSITIVE: ICD-10-CM

## 2022-03-08 DIAGNOSIS — N83.209 CYST OF OVARY, UNSPECIFIED LATERALITY: ICD-10-CM

## 2022-03-08 PROCEDURE — 76830 TRANSVAGINAL US NON-OB: CPT | Mod: TC

## 2022-03-08 PROCEDURE — 76830 US PELVIS COMP WITH TRANSVAG NON-OB (XPD): ICD-10-PCS | Mod: 26,,, | Performed by: RADIOLOGY

## 2022-03-08 PROCEDURE — 76830 TRANSVAGINAL US NON-OB: CPT | Mod: 26,,, | Performed by: RADIOLOGY

## 2022-03-08 PROCEDURE — 76856 US EXAM PELVIC COMPLETE: CPT | Mod: 26,,, | Performed by: RADIOLOGY

## 2022-03-08 PROCEDURE — 76856 US PELVIS COMP WITH TRANSVAG NON-OB (XPD): ICD-10-PCS | Mod: 26,,, | Performed by: RADIOLOGY

## 2022-03-11 ENCOUNTER — PATIENT MESSAGE (OUTPATIENT)
Dept: GYNECOLOGIC ONCOLOGY | Facility: CLINIC | Age: 32
End: 2022-03-11
Payer: COMMERCIAL

## 2022-03-11 ENCOUNTER — TELEPHONE (OUTPATIENT)
Dept: GYNECOLOGIC ONCOLOGY | Facility: CLINIC | Age: 32
End: 2022-03-11
Payer: COMMERCIAL

## 2022-03-11 DIAGNOSIS — Z15.01 BRCA2 POSITIVE: Primary | ICD-10-CM

## 2022-03-11 DIAGNOSIS — Z15.09 BRCA2 POSITIVE: Primary | ICD-10-CM

## 2022-03-14 ENCOUNTER — TELEPHONE (OUTPATIENT)
Dept: GYNECOLOGIC ONCOLOGY | Facility: CLINIC | Age: 32
End: 2022-03-14
Payer: COMMERCIAL

## 2022-03-14 NOTE — TELEPHONE ENCOUNTER
----- Message from Judith Clark MD sent at 3/11/2022  2:49 PM CST -----  Regarding: schedule follow up  Hi Maricruz-  Please schedule patient for pelvic US and  in 6 months along with an office visit with me.   Thank you! Orders in epic and I sent the patient a message.

## 2022-05-04 ENCOUNTER — OFFICE VISIT (OUTPATIENT)
Dept: OBSTETRICS AND GYNECOLOGY | Facility: CLINIC | Age: 32
End: 2022-05-04
Payer: COMMERCIAL

## 2022-05-04 ENCOUNTER — LAB VISIT (OUTPATIENT)
Dept: LAB | Facility: HOSPITAL | Age: 32
End: 2022-05-04
Attending: NURSE PRACTITIONER
Payer: COMMERCIAL

## 2022-05-04 VITALS
DIASTOLIC BLOOD PRESSURE: 70 MMHG | SYSTOLIC BLOOD PRESSURE: 100 MMHG | HEIGHT: 66 IN | BODY MASS INDEX: 19.49 KG/M2 | WEIGHT: 121.25 LBS

## 2022-05-04 DIAGNOSIS — Z12.4 ENCOUNTER FOR PAPANICOLAOU SMEAR FOR CERVICAL CANCER SCREENING: ICD-10-CM

## 2022-05-04 DIAGNOSIS — Z34.90 UNPLANNED PREGNANCY: ICD-10-CM

## 2022-05-04 DIAGNOSIS — Z34.90 UNPLANNED PREGNANCY: Primary | ICD-10-CM

## 2022-05-04 DIAGNOSIS — Z11.51 SCREENING FOR HPV (HUMAN PAPILLOMAVIRUS): ICD-10-CM

## 2022-05-04 LAB
ABO + RH BLD: NORMAL
B-HCG UR QL: POSITIVE
BLD GP AB SCN CELLS X3 SERPL QL: NORMAL
CTP QC/QA: YES
HCG INTACT+B SERPL-ACNC: NORMAL MIU/ML

## 2022-05-04 PROCEDURE — 81025 POCT URINE PREGNANCY: ICD-10-PCS | Mod: S$GLB,,, | Performed by: NURSE PRACTITIONER

## 2022-05-04 PROCEDURE — 99999 PR PBB SHADOW E&M-EST. PATIENT-LVL III: ICD-10-PCS | Mod: PBBFAC,,, | Performed by: NURSE PRACTITIONER

## 2022-05-04 PROCEDURE — 88175 CYTOPATH C/V AUTO FLUID REDO: CPT | Performed by: NURSE PRACTITIONER

## 2022-05-04 PROCEDURE — 87624 HPV HI-RISK TYP POOLED RSLT: CPT | Performed by: NURSE PRACTITIONER

## 2022-05-04 PROCEDURE — 99213 PR OFFICE/OUTPT VISIT, EST, LEVL III, 20-29 MIN: ICD-10-PCS | Mod: S$GLB,,, | Performed by: NURSE PRACTITIONER

## 2022-05-04 PROCEDURE — 86901 BLOOD TYPING SEROLOGIC RH(D): CPT | Performed by: NURSE PRACTITIONER

## 2022-05-04 PROCEDURE — 81025 URINE PREGNANCY TEST: CPT | Mod: S$GLB,,, | Performed by: NURSE PRACTITIONER

## 2022-05-04 PROCEDURE — 99213 OFFICE O/P EST LOW 20 MIN: CPT | Mod: S$GLB,,, | Performed by: NURSE PRACTITIONER

## 2022-05-04 PROCEDURE — 36415 COLL VENOUS BLD VENIPUNCTURE: CPT | Mod: PN | Performed by: NURSE PRACTITIONER

## 2022-05-04 PROCEDURE — 84702 CHORIONIC GONADOTROPIN TEST: CPT | Performed by: NURSE PRACTITIONER

## 2022-05-04 PROCEDURE — 99999 PR PBB SHADOW E&M-EST. PATIENT-LVL III: CPT | Mod: PBBFAC,,, | Performed by: NURSE PRACTITIONER

## 2022-05-04 NOTE — PROGRESS NOTES
CC: + UPT     HPI: Pt is a 32 y.o.  female who presents c/o amenorrhea and + home UPT.  LMP 3/20/22.  UPT in clinic today is positive. She is undecided if she wants to continue with the pregnancy or not.         ROS:  GENERAL: Feeling well overall. Denies fever or chills.   SKIN: Denies rash or lesions.   HEAD: Denies head injury or headache.   NODES: Denies enlarged lymph nodes.   CHEST: Denies chest pain or shortness of breath.   CARDIOVASCULAR: Denies palpitations or left sided chest pain.   ABDOMEN: No abdominal pain, constipation, diarrhea, nausea, vomiting or rectal bleeding.   URINARY: No dysuria, hematuria, or burning on urination.  REPRODUCTIVE: See HPI.   BREASTS: Denies pain, lumps, or nipple discharge.   HEMATOLOGIC: No easy bruisability or excessive bleeding.   MUSCULOSKELETAL: Denies joint pain or swelling.   NEUROLOGIC: Denies syncope or weakness.   PSYCHIATRIC: Denies depression, anxiety or mood swings.      PE:   APPEARANCE: Well nourished, well developed, White female in no acute distress.  VULVA: No lesions. Normal external female genitalia.  URETHRAL MEATUS: Normal size and location, no lesions, no prolapse.  URETHRA: No masses, tenderness, or prolapse.  VAGINA: Moist. No lesions or lacerations noted. No abnormal discharge present. No odor present.   CERVIX: No lesions or discharge. No cervical motion tenderness.   UTERUS: Normal size, regular shape, mobile, non-tender.  ADNEXA: No tenderness. No fullness or masses palpated in the adnexal regions.   ANUS PERINEUM: Normal.      Diagnosis:  1. Unplanned pregnancy    2. Encounter for Papanicolaou smear for cervical cancer screening    3. Screening for HPV (human papillomavirus)        Plan:     Orders Placed This Encounter    HPV High Risk Genotypes, PCR    HCG, Quantitative    POCT urine pregnancy    Type & Screen    Liquid-Based Pap Smear, Screening    US OB/GYN Procedure ([facility])            Diagnosis:  1. Unplanned pregnancy    2.  Encounter for Papanicolaou smear for cervical cancer screening    3. Screening for HPV (human papillomavirus)        Plan:     Orders Placed This Encounter    HPV High Risk Genotypes, PCR    HCG, Quantitative    POCT urine pregnancy    Type & Screen    Liquid-Based Pap Smear, Screening    US OB/GYN Procedure ([facility])        Plan:   UPT is positive  Discussed if she plans to continue the pregnancy to contact clinic and will obtain dating US and PN labs  Discussed if she goes through with pregnancy termination to f/u 2 weeks after procedure for pelvic exam and hcg level      Orders Placed This Encounter    POCT Urine Pregnancy            Follow-up BRENDA Plunkett, KENYATTAP-C

## 2022-05-11 LAB
FINAL PATHOLOGIC DIAGNOSIS: NORMAL
Lab: NORMAL

## 2022-05-12 ENCOUNTER — PATIENT MESSAGE (OUTPATIENT)
Dept: OBSTETRICS AND GYNECOLOGY | Facility: CLINIC | Age: 32
End: 2022-05-12
Payer: COMMERCIAL

## 2022-05-25 ENCOUNTER — PATIENT MESSAGE (OUTPATIENT)
Dept: OBSTETRICS AND GYNECOLOGY | Facility: CLINIC | Age: 32
End: 2022-05-25
Payer: COMMERCIAL

## 2022-06-01 ENCOUNTER — OFFICE VISIT (OUTPATIENT)
Dept: OBSTETRICS AND GYNECOLOGY | Facility: CLINIC | Age: 32
End: 2022-06-01
Payer: COMMERCIAL

## 2022-06-01 ENCOUNTER — LAB VISIT (OUTPATIENT)
Dept: LAB | Facility: HOSPITAL | Age: 32
End: 2022-06-01
Attending: NURSE PRACTITIONER
Payer: COMMERCIAL

## 2022-06-01 VITALS
SYSTOLIC BLOOD PRESSURE: 106 MMHG | BODY MASS INDEX: 19.68 KG/M2 | WEIGHT: 121.94 LBS | DIASTOLIC BLOOD PRESSURE: 62 MMHG

## 2022-06-01 DIAGNOSIS — Z98.890 STATUS POST ELECTIVE ABORTION: ICD-10-CM

## 2022-06-01 DIAGNOSIS — Z30.09 BIRTH CONTROL COUNSELING: ICD-10-CM

## 2022-06-01 DIAGNOSIS — Z98.890 STATUS POST ELECTIVE ABORTION: Primary | ICD-10-CM

## 2022-06-01 LAB — HCG INTACT+B SERPL-ACNC: 448 MIU/ML

## 2022-06-01 PROCEDURE — 99999 PR PBB SHADOW E&M-EST. PATIENT-LVL III: CPT | Mod: PBBFAC,,, | Performed by: NURSE PRACTITIONER

## 2022-06-01 PROCEDURE — 36415 COLL VENOUS BLD VENIPUNCTURE: CPT | Mod: PN | Performed by: NURSE PRACTITIONER

## 2022-06-01 PROCEDURE — 99212 PR OFFICE/OUTPT VISIT, EST, LEVL II, 10-19 MIN: ICD-10-PCS | Mod: S$GLB,,, | Performed by: NURSE PRACTITIONER

## 2022-06-01 PROCEDURE — 84702 CHORIONIC GONADOTROPIN TEST: CPT | Performed by: NURSE PRACTITIONER

## 2022-06-01 PROCEDURE — 99999 PR PBB SHADOW E&M-EST. PATIENT-LVL III: ICD-10-PCS | Mod: PBBFAC,,, | Performed by: NURSE PRACTITIONER

## 2022-06-01 PROCEDURE — 99212 OFFICE O/P EST SF 10 MIN: CPT | Mod: S$GLB,,, | Performed by: NURSE PRACTITIONER

## 2022-06-01 NOTE — PROGRESS NOTES
CC: f/u pregnacy termination   HPI: Pt is a 32 y.o.  female who presents as a follow up for pregnancy termination. She used oral and vaginal agent on 5/15/22.  She is still having bleeding daily. Reports some cramping - but much better. Muna any associated fever or potent vaginal DC. She is interested in L[facility].       ROS:  GENERAL: Feeling well overall. Denies fever or chills.   SKIN: Denies rash or lesions.   HEAD: Denies head injury or headache.   NODES: Denies enlarged lymph nodes.   CHEST: Denies chest pain or shortness of breath.   CARDIOVASCULAR: Denies palpitations or left sided chest pain.   ABDOMEN: No abdominal pain, constipation, diarrhea, nausea, vomiting or rectal bleeding.   URINARY: No dysuria, hematuria, or burning on urination.  REPRODUCTIVE: See HPI.   BREASTS: Denies pain, lumps, or nipple discharge.   HEMATOLOGIC: No easy bruisability or excessive bleeding.   MUSCULOSKELETAL: Denies joint pain or swelling.   NEUROLOGIC: Denies syncope or weakness.   PSYCHIATRIC: Denies depression, anxiety or mood swings.    PE:   APPEARANCE: Well nourished, well developed, White female in no acute distress.  PELVIS: Deferred       Diagnosis:  1. Status post elective     2. Birth control counseling        Plan:   hcg today and will follow up until less than 5  Patient was counseled today on contraceptive options: barrier, hormonal (OCPs, Depo-Provera, NuvaRing, Nexplanon), IUDs (Mirena, ParaGard), etc.  Pt to research and notify clinic once she has decided on a device     Orders Placed This Encounter    HCG, Quantitative         Follow-up PRN no resolution of symptoms.    MICHAEL Hdz

## 2022-06-02 ENCOUNTER — PATIENT MESSAGE (OUTPATIENT)
Dept: OBSTETRICS AND GYNECOLOGY | Facility: CLINIC | Age: 32
End: 2022-06-02
Payer: COMMERCIAL

## 2022-06-02 ENCOUNTER — TELEPHONE (OUTPATIENT)
Dept: OBSTETRICS AND GYNECOLOGY | Facility: CLINIC | Age: 32
End: 2022-06-02
Payer: COMMERCIAL

## 2022-06-02 DIAGNOSIS — Z98.890 STATUS POST ELECTIVE ABORTION: Primary | ICD-10-CM

## 2022-06-02 DIAGNOSIS — Z30.9 ENCOUNTER FOR CONTRACEPTIVE MANAGEMENT, UNSPECIFIED TYPE: Primary | ICD-10-CM

## 2022-06-09 ENCOUNTER — LAB VISIT (OUTPATIENT)
Dept: LAB | Facility: OTHER | Age: 32
End: 2022-06-09
Attending: NURSE PRACTITIONER
Payer: COMMERCIAL

## 2022-06-09 DIAGNOSIS — Z98.890 STATUS POST ELECTIVE ABORTION: ICD-10-CM

## 2022-06-09 LAB — HCG INTACT+B SERPL-ACNC: 125 MIU/ML

## 2022-06-09 PROCEDURE — 84702 CHORIONIC GONADOTROPIN TEST: CPT | Performed by: NURSE PRACTITIONER

## 2022-06-09 PROCEDURE — 36415 COLL VENOUS BLD VENIPUNCTURE: CPT | Performed by: NURSE PRACTITIONER

## 2022-06-10 ENCOUNTER — PATIENT MESSAGE (OUTPATIENT)
Dept: OBSTETRICS AND GYNECOLOGY | Facility: CLINIC | Age: 32
End: 2022-06-10
Payer: COMMERCIAL

## 2022-06-14 ENCOUNTER — PATIENT MESSAGE (OUTPATIENT)
Dept: OBSTETRICS AND GYNECOLOGY | Facility: CLINIC | Age: 32
End: 2022-06-14
Payer: COMMERCIAL

## 2022-06-28 ENCOUNTER — TELEPHONE (OUTPATIENT)
Dept: GASTROENTEROLOGY | Facility: CLINIC | Age: 32
End: 2022-06-28
Payer: COMMERCIAL

## 2022-06-29 ENCOUNTER — PROCEDURE VISIT (OUTPATIENT)
Dept: OBSTETRICS AND GYNECOLOGY | Facility: CLINIC | Age: 32
End: 2022-06-29
Payer: COMMERCIAL

## 2022-06-29 ENCOUNTER — LAB VISIT (OUTPATIENT)
Dept: LAB | Facility: OTHER | Age: 32
End: 2022-06-29
Attending: NURSE PRACTITIONER
Payer: COMMERCIAL

## 2022-06-29 VITALS
DIASTOLIC BLOOD PRESSURE: 60 MMHG | SYSTOLIC BLOOD PRESSURE: 94 MMHG | HEIGHT: 66 IN | BODY MASS INDEX: 20.09 KG/M2 | WEIGHT: 125 LBS

## 2022-06-29 DIAGNOSIS — Z30.9 ENCOUNTER FOR CONTRACEPTIVE MANAGEMENT, UNSPECIFIED TYPE: Primary | ICD-10-CM

## 2022-06-29 DIAGNOSIS — Z98.890 STATUS POST ELECTIVE ABORTION: ICD-10-CM

## 2022-06-29 DIAGNOSIS — Z30.430 ENCOUNTER FOR IUD INSERTION: ICD-10-CM

## 2022-06-29 LAB
B-HCG UR QL: NEGATIVE
CTP QC/QA: YES
HCG INTACT+B SERPL-ACNC: 14 MIU/ML

## 2022-06-29 PROCEDURE — 36415 COLL VENOUS BLD VENIPUNCTURE: CPT | Performed by: NURSE PRACTITIONER

## 2022-06-29 PROCEDURE — 58300 INSERT INTRAUTERINE DEVICE: CPT | Mod: S$GLB,,, | Performed by: OBSTETRICS & GYNECOLOGY

## 2022-06-29 PROCEDURE — 58300 INSERTION OF IUD-TODAY: ICD-10-PCS | Mod: S$GLB,,, | Performed by: OBSTETRICS & GYNECOLOGY

## 2022-06-29 PROCEDURE — 81025 POCT URINE PREGNANCY: ICD-10-PCS | Mod: QW,S$GLB,, | Performed by: OBSTETRICS & GYNECOLOGY

## 2022-06-29 PROCEDURE — 84702 CHORIONIC GONADOTROPIN TEST: CPT | Performed by: NURSE PRACTITIONER

## 2022-06-29 PROCEDURE — 81025 URINE PREGNANCY TEST: CPT | Mod: QW,S$GLB,, | Performed by: OBSTETRICS & GYNECOLOGY

## 2022-06-30 NOTE — PROCEDURES
Insertion of IUD-Today    Date/Time: 6/29/2022 2:45 PM  Performed by: Betty Art MD  Authorized by: Betty Art MD     Consent:     Consent obtained:  Written    Consent given by:  Patient    Procedure risks and benefits discussed: yes      Patient questions answered: yes      Patient agrees, verbalizes understanding, and wants to proceed: yes      Educational handouts given: yes      Instructions and paperwork completed: yes    Procedure:     Pelvic exam performed: yes      Negative GC/chlamydia test: no      Negative urine pregnancy test: yes      Negative serum pregnancy test: no      Cervix cleaned and prepped: yes      Speculum placed in vagina: yes      Tenaculum applied to cervix: yes      Uterus sounded: yes      Uterus sound depth (cm):  9    IUD inserted with no complications: yes      Strings trimmed: yes    380 mm copper intrauterine device 380 square mm       Post-procedure:     Patient tolerated procedure well: yes      Patient will follow up after next period: yes

## 2022-07-24 ENCOUNTER — PATIENT MESSAGE (OUTPATIENT)
Dept: OBSTETRICS AND GYNECOLOGY | Facility: CLINIC | Age: 32
End: 2022-07-24
Payer: COMMERCIAL

## 2022-08-30 ENCOUNTER — OFFICE VISIT (OUTPATIENT)
Dept: OBSTETRICS AND GYNECOLOGY | Facility: CLINIC | Age: 32
End: 2022-08-30
Payer: COMMERCIAL

## 2022-08-30 VITALS
HEIGHT: 66 IN | SYSTOLIC BLOOD PRESSURE: 94 MMHG | WEIGHT: 126.13 LBS | BODY MASS INDEX: 20.27 KG/M2 | DIASTOLIC BLOOD PRESSURE: 68 MMHG

## 2022-08-30 DIAGNOSIS — Z30.431 IUD CHECK UP: Primary | ICD-10-CM

## 2022-08-30 PROCEDURE — 99499 UNLISTED E&M SERVICE: CPT | Mod: S$GLB,,, | Performed by: NURSE PRACTITIONER

## 2022-08-30 PROCEDURE — 99999 PR PBB SHADOW E&M-EST. PATIENT-LVL III: ICD-10-PCS | Mod: PBBFAC,,, | Performed by: NURSE PRACTITIONER

## 2022-08-30 PROCEDURE — 99499 NO LOS: ICD-10-PCS | Mod: S$GLB,,, | Performed by: NURSE PRACTITIONER

## 2022-08-30 PROCEDURE — 99999 PR PBB SHADOW E&M-EST. PATIENT-LVL III: CPT | Mod: PBBFAC,,, | Performed by: NURSE PRACTITIONER

## 2022-08-30 NOTE — PROGRESS NOTES
CC: IUD check    Pt is a 31 y/o female  presents for IUD check. Patient had a Paragard placed on 22. She is not having problems with cramping, fever or discharge. She has not tried to feel the strings. Reports cycles are heavier with the Paragard.       ROS:  GENERAL: Feeling well overall. Denies fever or chills.   SKIN: Denies rash or lesions.   HEAD: Denies head injury or headache.   NODES: Denies enlarged lymph nodes.   CHEST: Denies chest pain or shortness of breath.   CARDIOVASCULAR: Denies palpitations or left sided chest pain.   ABDOMEN: No abdominal pain, constipation, diarrhea, nausea, vomiting or rectal bleeding.   URINARY: No dysuria, hematuria, or burning on urination.  REPRODUCTIVE: See HPI.   BREASTS: Denies pain, lumps, or nipple discharge.   HEMATOLOGIC: No easy bruisability or excessive bleeding.   MUSCULOSKELETAL: Denies joint pain or swelling.   NEUROLOGIC: Denies syncope or weakness.   PSYCHIATRIC: Denies depression, anxiety or mood swings.      PHYSICAL EXAM:  Abdomen: Soft, non-tender, non-distended  Vulva: No lesions  Vaginal: No lesions, no abnormal discharge  Cervix: No discharge, no CMT, IUD strings visible at os (2 cm out)  Uterus: Normal size, non-tender  Adnexa: No masses, non-tender    ASSESSMENT AND PLAN:  1. IUD surveillance    Patient counseled on IUD danger signs and how to check the strings reviewed.    Return for annual GYN exam    MICHAEL Hdz

## 2022-09-06 ENCOUNTER — OFFICE VISIT (OUTPATIENT)
Dept: URGENT CARE | Facility: CLINIC | Age: 32
End: 2022-09-06
Payer: COMMERCIAL

## 2022-09-06 ENCOUNTER — HOSPITAL ENCOUNTER (EMERGENCY)
Facility: HOSPITAL | Age: 32
Discharge: HOME | End: 2022-09-06
Attending: EMERGENCY MEDICINE
Payer: COMMERCIAL

## 2022-09-06 VITALS
WEIGHT: 125 LBS | SYSTOLIC BLOOD PRESSURE: 122 MMHG | HEART RATE: 68 BPM | HEIGHT: 66 IN | DIASTOLIC BLOOD PRESSURE: 61 MMHG | TEMPERATURE: 98 F | OXYGEN SATURATION: 100 % | BODY MASS INDEX: 20.09 KG/M2 | RESPIRATION RATE: 18 BRPM

## 2022-09-06 VITALS
HEIGHT: 66 IN | DIASTOLIC BLOOD PRESSURE: 59 MMHG | HEART RATE: 81 BPM | BODY MASS INDEX: 20.09 KG/M2 | SYSTOLIC BLOOD PRESSURE: 133 MMHG | TEMPERATURE: 98 F | WEIGHT: 125 LBS | OXYGEN SATURATION: 97 % | RESPIRATION RATE: 18 BRPM

## 2022-09-06 DIAGNOSIS — R55 SYNCOPE: ICD-10-CM

## 2022-09-06 DIAGNOSIS — S06.9X9A HEAD INJURY WITH LOSS OF CONSCIOUSNESS: Primary | ICD-10-CM

## 2022-09-06 DIAGNOSIS — R51.9 NONINTRACTABLE HEADACHE, UNSPECIFIED CHRONICITY PATTERN, UNSPECIFIED HEADACHE TYPE: Primary | ICD-10-CM

## 2022-09-06 LAB
ALBUMIN SERPL BCP-MCNC: 4.3 G/DL (ref 3.5–5.2)
ALP SERPL-CCNC: 49 U/L (ref 55–135)
ALT SERPL W/O P-5'-P-CCNC: 10 U/L (ref 10–44)
ANION GAP SERPL CALC-SCNC: 13 MMOL/L (ref 8–16)
AST SERPL-CCNC: 13 U/L (ref 10–40)
B-HCG UR QL: NEGATIVE
BASOPHILS # BLD AUTO: 0.04 K/UL (ref 0–0.2)
BASOPHILS NFR BLD: 0.5 % (ref 0–1.9)
BILIRUB SERPL-MCNC: 0.2 MG/DL (ref 0.1–1)
BUN SERPL-MCNC: 6 MG/DL (ref 6–20)
CALCIUM SERPL-MCNC: 9.5 MG/DL (ref 8.7–10.5)
CHLORIDE SERPL-SCNC: 104 MMOL/L (ref 95–110)
CO2 SERPL-SCNC: 23 MMOL/L (ref 23–29)
CREAT SERPL-MCNC: 0.7 MG/DL (ref 0.5–1.4)
CTP QC/QA: YES
DIFFERENTIAL METHOD: NORMAL
EOSINOPHIL # BLD AUTO: 0.2 K/UL (ref 0–0.5)
EOSINOPHIL NFR BLD: 2.1 % (ref 0–8)
ERYTHROCYTE [DISTWIDTH] IN BLOOD BY AUTOMATED COUNT: 11.7 % (ref 11.5–14.5)
EST. GFR  (NO RACE VARIABLE): >60 ML/MIN/1.73 M^2
GLUCOSE SERPL-MCNC: 92 MG/DL (ref 70–110)
HCT VFR BLD AUTO: 39 % (ref 37–48.5)
HCV AB SERPL QL IA: NORMAL
HGB BLD-MCNC: 13.5 G/DL (ref 12–16)
HIV 1+2 AB+HIV1 P24 AG SERPL QL IA: NORMAL
IMM GRANULOCYTES # BLD AUTO: 0.01 K/UL (ref 0–0.04)
IMM GRANULOCYTES NFR BLD AUTO: 0.1 % (ref 0–0.5)
LYMPHOCYTES # BLD AUTO: 2 K/UL (ref 1–4.8)
LYMPHOCYTES NFR BLD: 25.4 % (ref 18–48)
MCH RBC QN AUTO: 30.9 PG (ref 27–31)
MCHC RBC AUTO-ENTMCNC: 34.6 G/DL (ref 32–36)
MCV RBC AUTO: 89 FL (ref 82–98)
MONOCYTES # BLD AUTO: 0.5 K/UL (ref 0.3–1)
MONOCYTES NFR BLD: 6.4 % (ref 4–15)
NEUTROPHILS # BLD AUTO: 5.2 K/UL (ref 1.8–7.7)
NEUTROPHILS NFR BLD: 65.5 % (ref 38–73)
NRBC BLD-RTO: 0 /100 WBC
PLATELET # BLD AUTO: 200 K/UL (ref 150–450)
PMV BLD AUTO: 11.6 FL (ref 9.2–12.9)
POTASSIUM SERPL-SCNC: 3.9 MMOL/L (ref 3.5–5.1)
PROT SERPL-MCNC: 7.4 G/DL (ref 6–8.4)
RBC # BLD AUTO: 4.37 M/UL (ref 4–5.4)
SODIUM SERPL-SCNC: 140 MMOL/L (ref 136–145)
WBC # BLD AUTO: 8 K/UL (ref 3.9–12.7)

## 2022-09-06 PROCEDURE — 81025 URINE PREGNANCY TEST: CPT | Performed by: EMERGENCY MEDICINE

## 2022-09-06 PROCEDURE — 86803 HEPATITIS C AB TEST: CPT | Performed by: PHYSICIAN ASSISTANT

## 2022-09-06 PROCEDURE — 99285 EMERGENCY DEPT VISIT HI MDM: CPT | Mod: 25

## 2022-09-06 PROCEDURE — 96374 THER/PROPH/DIAG INJ IV PUSH: CPT

## 2022-09-06 PROCEDURE — 93005 ELECTROCARDIOGRAM TRACING: CPT

## 2022-09-06 PROCEDURE — 87389 HIV-1 AG W/HIV-1&-2 AB AG IA: CPT | Performed by: PHYSICIAN ASSISTANT

## 2022-09-06 PROCEDURE — 85025 COMPLETE CBC W/AUTO DIFF WBC: CPT | Performed by: EMERGENCY MEDICINE

## 2022-09-06 PROCEDURE — 80053 COMPREHEN METABOLIC PANEL: CPT | Performed by: EMERGENCY MEDICINE

## 2022-09-06 PROCEDURE — 63600175 PHARM REV CODE 636 W HCPCS: Performed by: EMERGENCY MEDICINE

## 2022-09-06 PROCEDURE — 99215 OFFICE O/P EST HI 40 MIN: CPT | Mod: S$GLB,,, | Performed by: NURSE PRACTITIONER

## 2022-09-06 PROCEDURE — 25000003 PHARM REV CODE 250: Performed by: EMERGENCY MEDICINE

## 2022-09-06 PROCEDURE — 99215 PR OFFICE/OUTPT VISIT, EST, LEVL V, 40-54 MIN: ICD-10-PCS | Mod: S$GLB,,, | Performed by: NURSE PRACTITIONER

## 2022-09-06 PROCEDURE — 93010 ELECTROCARDIOGRAM REPORT: CPT | Mod: ,,, | Performed by: INTERNAL MEDICINE

## 2022-09-06 PROCEDURE — 99284 EMERGENCY DEPT VISIT MOD MDM: CPT | Mod: ,,, | Performed by: PHYSICIAN ASSISTANT

## 2022-09-06 PROCEDURE — 93010 EKG 12-LEAD: ICD-10-PCS | Mod: ,,, | Performed by: INTERNAL MEDICINE

## 2022-09-06 PROCEDURE — 99284 PR EMERGENCY DEPT VISIT,LEVEL IV: ICD-10-PCS | Mod: ,,, | Performed by: PHYSICIAN ASSISTANT

## 2022-09-06 RX ORDER — METOCLOPRAMIDE HYDROCHLORIDE 5 MG/ML
10 INJECTION INTRAMUSCULAR; INTRAVENOUS
Status: COMPLETED | OUTPATIENT
Start: 2022-09-06 | End: 2022-09-06

## 2022-09-06 RX ADMIN — SODIUM CHLORIDE 500 ML: 0.9 INJECTION, SOLUTION INTRAVENOUS at 09:09

## 2022-09-06 RX ADMIN — METOCLOPRAMIDE 10 MG: 5 INJECTION, SOLUTION INTRAMUSCULAR; INTRAVENOUS at 09:09

## 2022-09-06 NOTE — PROGRESS NOTES
"Subjective:       Patient ID: Haley Henriquez is a 32 y.o. female.    Vitals:  height is 5' 6" (1.676 m) and weight is 56.7 kg (125 lb). Her oral temperature is 98.1 °F (36.7 °C). Her blood pressure is 133/59 (abnormal) and her pulse is 81. Her respiration is 18 and oxygen saturation is 97%.     Chief Complaint: Headache    Patient presents with concern of confusion, not feeling right, dizziness, and headache after hitting her head w/ LOC at 3am Monday morning.  She was having difficulty sleeping and went to make chamomile tea at her partner's apartment.  She blacked out while standing     Headache   This is a new problem. The current episode started yesterday. The problem occurs constantly. The problem has been unchanged. The pain does not radiate. The pain is at a severity of 6/10. Associated symptoms include dizziness, neck pain and photophobia. Pertinent negatives include no abdominal pain, abnormal behavior, anorexia, back pain, blurred vision, coughing, drainage, ear pain, eye pain, eye redness, eye watering, facial sweating, fever, hearing loss, insomnia, loss of balance, muscle aches, nausea, numbness, phonophobia, rhinorrhea, scalp tenderness, seizures, sinus pressure, sore throat, swollen glands, tingling, tinnitus, visual change, vomiting, weakness or weight loss. Nothing aggravates the symptoms. She has tried nothing for the symptoms.     Constitution: Negative for fever.   HENT:  Negative for ear pain, tinnitus, hearing loss, sinus pressure and sore throat.    Neck: Positive for neck pain. Negative for neck stiffness.   Cardiovascular:  Negative for chest pain.   Eyes:  Positive for photophobia. Negative for eye pain, eye redness and blurred vision.   Respiratory:  Negative for cough.    Gastrointestinal:  Negative for abdominal pain, nausea and vomiting.   Musculoskeletal:  Negative for back pain.   Neurological:  Positive for dizziness, headaches and loss of consciousness. Negative for speech " difficulty, coordination disturbances, loss of balance, numbness and seizures.   Psychiatric/Behavioral:  The patient does not have insomnia.      Objective:      Physical Exam   Constitutional: She is oriented to person, place, and time. She appears well-developed.  Non-toxic appearance. She does not appear ill. No distress.   HENT:   Head: Normocephalic and atraumatic.   Ears:   Right Ear: Hearing, tympanic membrane, external ear and ear canal normal.   Left Ear: Hearing, tympanic membrane, external ear and ear canal normal.   Nose: Nose normal. No mucosal edema, rhinorrhea or nasal deformity. No epistaxis. Right sinus exhibits no maxillary sinus tenderness and no frontal sinus tenderness. Left sinus exhibits no maxillary sinus tenderness and no frontal sinus tenderness.   Mouth/Throat: Uvula is midline, oropharynx is clear and moist and mucous membranes are normal. No trismus in the jaw. Normal dentition. No uvula swelling. No posterior oropharyngeal erythema.   Eyes: Conjunctivae, EOM and lids are normal. Pupils are equal, round, and reactive to light. No visual field deficit is present. No scleral icterus. Extraocular movement intact   Neck: Trachea normal and phonation normal. Neck supple. No neck rigidity present.   Cardiovascular: Normal rate, regular rhythm, normal heart sounds and normal pulses.   Pulmonary/Chest: Effort normal and breath sounds normal. No respiratory distress.   Abdominal: Normal appearance and bowel sounds are normal. She exhibits no distension. Soft. There is no abdominal tenderness.   Musculoskeletal: Normal range of motion.         General: No deformity. Normal range of motion.   Neurological: no focal deficit. She is alert and oriented to person, place, and time. She has normal motor skills and normal sensation. She displays facial symmetry and no dysarthria. No cranial nerve deficit or sensory deficit. She exhibits normal muscle tone. Gait and coordination normal. Coordination  normal.      Comments: Small bruise between eyes to bridge of nose - states that this is from acupuncture.     Skin: Skin is warm, dry, intact, not diaphoretic and not pale.   Psychiatric: Her speech is normal and behavior is normal. Judgment and thought content normal.   Nursing note and vitals reviewed.      Assessment:       1. Head injury with loss of consciousness          Plan:       Case discussed with Dr. Roxy Ward in clinic who agrees to plan of care.  Patient referred to [facility] for need of head ct.    Head injury with loss of consciousness  -     Refer to Emergency Dept.       Patient Instructions   PLEASE GO DIRECTLY TO [facility] FOR FURTHER EVALUATION/HEAD CT.

## 2022-09-06 NOTE — Clinical Note
"Haley"Johny Henriquez was seen and treated in our emergency department on 9/6/2022.  She may return to work on 09/08/2022.       If you have any questions or concerns, please don't hesitate to call.      Yonas Mancilla PA-C"

## 2022-09-06 NOTE — FIRST PROVIDER EVALUATION
"Medical screening exam completed.  I have conducted a focused provider triage encounter, findings are as follows:    Brief history of present illness:  fell and hit head yesterday, today with ha    Vitals:    09/06/22 1739   BP: 122/61   Pulse: 68   Resp: 18   Temp: 98.2 °F (36.8 °C)   TempSrc: Oral   SpO2: 100%   Weight: 56.7 kg (125 lb)   Height: 5' 6" (1.676 m)       Pertinent physical exam:  No acute distress or altered mental status.     Brief workup plan:  labs, ecg    Preliminary workup initiated; this workup will be continued and followed by the physician or advanced practice provider that is assigned to the patient when roomed.  "

## 2022-09-07 ENCOUNTER — PATIENT MESSAGE (OUTPATIENT)
Dept: OBSTETRICS AND GYNECOLOGY | Facility: CLINIC | Age: 32
End: 2022-09-07
Payer: COMMERCIAL

## 2022-09-07 DIAGNOSIS — Z30.431 IUD CHECK UP: Primary | ICD-10-CM

## 2022-09-07 DIAGNOSIS — N93.9 ABNORMAL UTERINE BLEEDING (AUB): ICD-10-CM

## 2022-09-07 NOTE — DISCHARGE INSTRUCTIONS
Hydrate by drinking plenty of water. You may take Tylenol or Ibuprofen as needed for your headache. Your CT scan showed an incidental findings of an old infarction.     Follow-up with your primary care provider.     Return to the emergency room for new, worsening, or concerning symptoms.     Future Appointments   Date Time Provider Department Center   9/13/2022  8:45 AM LAB, [facility] LABDRAW [facility]   9/13/2022  9:45 AM [facility] USOP3 [facility] USOUNDO [facility]   9/14/2022  9:30 AM Judith Clark MD [facility] GYN ONC [facility]

## 2022-09-07 NOTE — ED NOTES
Patient identifiers verified and correct for Ms Henriquez  C/C Headche SEE NN  APPEARANCE: awake and alert in NAD.  SKIN: warm, dry and intact. No breakdown or bruising.  MUSCULOSKELETAL: Patient moving all extremities spontaneously, no obvious swelling or deformities noted. Ambulates independently.  RESPIRATORY: Denies shortness of breath.Respirations unlabored.   CARDIAC: Denies CP, 2+ distal pulses; no peripheral edema  ABDOMEN: S/ND/NT, Denies nausea  : voids spontaneously, denies difficulty  Neurologic: AAO x 4; follows commands equal strength in all extremities; denies numbness/tingling. Denies dizziness Denies lightheaded, denies weakness   Psychoeducation Group Note    PATIENT'S NAME: Rama Gutierrez  MRN:   [id number]  :   1990  ACCT. NUMBER: [id number]  DATE OF SERVICE: 10/20/20  START TIME: 10:00 AM  END TIME: 10:50 AM  FACILITATOR: Ugo Hickey OTR/L  TOPIC: MH Life Skills Group: Lifestyle Balance and Structure  Telemedicine Visit: The patient's condition can be safely assessed and treated via synchronous audio and visual telemedicine encounter.      Reason for Telemedicine Visit: COVID-19    Originating Site (Patient Location): Patient's home    Distant Site (Provider Location): [facility]: [facility]    Consent:  The patient/guardian has verbally consented to: the potential risks and benefits of telemedicine (video visit) versus in person care; bill my insurance or make self-payment for services provided; and responsibility for payment of non-covered services.     Mode of Communication:  Video Conference via [employer]    As the provider I attest to compliance with applicable laws and regulations related to telemedicine.   Adult Mental Health Day Treatment  TRACK: 5A    NUMBER OF PARTICIPANTS: 5    Summary of Group / Topics Discussed:  Lifestyle Balance and Strucure:  Spiritual Wellness: Patients were assisted to identify meaningful roles that they want to promote and the impact their mental health symptoms have on this.  Patients learned, applied, and generalized skills needed to live and participate in meaningful roles as effectively and independently as possible.  Patients developed awareness of strengths and challenges in fulfilling these roles and worked on integrating specific and individualized rituals and habits into their daily life.     Patient Session Goals / Objectives:    Improved awareness and engagement in life s meaningful roles, routines, habits, and rituals    Explored and identified current roles and challenges due to mental health symptoms     Identified ways to establish and integrate daily  self care and wellness routines and habits to support mental health recovery    Practiced and reflected on how to generalize taught skills to their everyday life    Activities to create a sense of spiritual wellness      Patient Participation / Response:  Fully participated with the group by sharing personal reflections / insights and openly received / provided feedback with other participants.    Demonstrated understanding of content through handout and group discussion  and Verbalized understanding of content    Treatment Plan:  Patient has a current master individualized treatment plan.  See Epic treatment plan for more information.    Ugo Hickey, OTR/L

## 2022-09-07 NOTE — ED NOTES
"Patient states she " passed out" Monday,  episode lasted 30 seconds, states she hit her head, reports headache today, no OTC meds  "

## 2022-09-07 NOTE — ED PROVIDER NOTES
Encounter Date: 9/6/2022       History     Chief Complaint   Patient presents with    Loss of Consciousness     Passed out Monday at 3 am, hit head, feeling slow, not able to process,seen at , not on blood thinners    Headache     The history is provided by the patient and medical records. No  was used.     Haley Henriquez is a 32 y.o. female with medical history of anxiety, migraine presenting to the ED with the chief complaint of loss of consciousness.    Patient reports feeling generalized weakness and fatigue for the past week. Reports sleeping multiple hours a day. Experienced a syncopal episode yesterday AM around 3am while making tea at her partners house. Partner heard her fall and came to her aid. Reports having difficulty concentrating and persistent occipital headache since the episode. She was seen at  who referred her to the ED for further evaluation. Denies vision changes, speech changes, numbness, paresthesias, extremity weakness. Recently changed birth control and notes her last menstrual cycle was longer. Estimates using 3 pads per day. No blood thinner use. No ETOH use.     Review of patient's allergies indicates:  No Known Allergies  Past Medical History:   Diagnosis Date    Allergy     Anxiety     Asthma     BRCA2 positive     Depression     Headache     Hx of psychiatric care     Panic disorder     Psychiatric problem     Therapy      Past Surgical History:   Procedure Laterality Date    COLPOSCOPY      KNEE SURGERY       Family History   Problem Relation Age of Onset    Breast cancer Maternal Aunt     BRCA 1/2 Mother     Ovarian cancer Maternal Grandmother     Anxiety disorder Maternal Grandmother     Depression Maternal Grandmother     Depression Father     Anxiety disorder Father     Depression Brother     Anxiety disorder Brother     Anxiety disorder Maternal Grandfather     Depression Maternal Grandfather     Anxiety disorder Paternal Grandfather     Depression  Paternal Grandfather     Anxiety disorder Paternal Grandmother     Depression Paternal Grandmother     Colon cancer Neg Hx      Social History     Tobacco Use    Smoking status: Never    Smokeless tobacco: Never   Substance Use Topics    Alcohol use: Yes     Comment: socially    Drug use: Not Currently     Frequency: 1.0 times per week     Types: Marijuana     Comment: 0nce per week or less     Review of Systems   Constitutional:  Positive for fatigue. Negative for fever.   HENT:  Negative for sore throat.    Respiratory:  Negative for shortness of breath.    Cardiovascular:  Negative for chest pain.   Gastrointestinal:  Negative for nausea.   Genitourinary:  Negative for dysuria.   Musculoskeletal:  Negative for back pain.   Skin:  Negative for rash.   Neurological:  Positive for syncope, weakness and headaches.   Hematological:  Does not bruise/bleed easily.     Physical Exam     Initial Vitals [09/06/22 1739]   BP Pulse Resp Temp SpO2   122/61 68 18 98.2 °F (36.8 °C) 100 %      MAP       --         Physical Exam    Constitutional: She appears well-developed and well-nourished. She is not diaphoretic. No distress.   HENT:   Head: Normocephalic and atraumatic.   Mouth/Throat: Oropharynx is clear and moist. No oropharyngeal exudate.   Eyes: Conjunctivae and EOM are normal. Pupils are equal, round, and reactive to light. No scleral icterus.   Neck: Neck supple.   Normal range of motion.  Cardiovascular:  Normal rate and regular rhythm.           Pulmonary/Chest: Breath sounds normal. No respiratory distress. She has no wheezes.   Abdominal: Abdomen is soft. She exhibits no distension. There is no abdominal tenderness. There is no rebound.   Musculoskeletal:         General: No tenderness or edema. Normal range of motion.      Cervical back: Normal range of motion and neck supple.     Neurological: She is alert and oriented to person, place, and time. She has normal strength. No sensory deficit.   Follows commands  appropriately. Ambulates without difficulty. No dysmetria, dysdiadochokinesia, peripheral vision deficits. Negative pronator drift   Skin: Skin is warm and dry. No rash noted. No erythema.   Psychiatric: She has a normal mood and affect.       ED Course   Procedures  Labs Reviewed   COMPREHENSIVE METABOLIC PANEL - Abnormal; Notable for the following components:       Result Value    Alkaline Phosphatase 49 (*)     All other components within normal limits    Narrative:     Release to patient->Immediate   CBC W/ AUTO DIFFERENTIAL    Narrative:     Release to patient->Immediate   HIV 1 / 2 ANTIBODY    Narrative:     Release to patient->Immediate   HEPATITIS C ANTIBODY    Narrative:     Release to patient->Immediate   POCT URINE PREGNANCY          Imaging Results              CT Head Without Contrast (Final result)  Result time 09/06/22 21:13:10      Final result by Jarrod Tran MD (09/06/22 21:13:10)                   Impression:      No acute intracranial abnormalities.    Small focus of encephalomalacia in the subcortical white matter right frontal lobe suggestive of possible remote lacunar infarct.      Electronically signed by: Jarrod Tran MD  Date:    09/06/2022  Time:    21:13               Narrative:    EXAMINATION:  CT HEAD WITHOUT CONTRAST    CLINICAL HISTORY:  Head trauma, moderate-severe;    TECHNIQUE:  Low dose axial images were obtained through the head.  Coronal and sagittal reformations were also performed. Contrast was not administered.    COMPARISON:  None.    FINDINGS:  Small focus of encephalomalacia in the subcortical white matter right frontal lobe suggestive of possible remote lacunar infarct.  There is no evidence of acute infarct, hemorrhage, or mass.  The ventricular system is normal in size.  No mass-effect or midline shift.  There are no abnormal extra-axial fluid collections.  The paranasal sinuses and mastoid air cells are clear.  The calvarium appears intact.  .                                        Medications   sodium chloride 0.9% bolus 500 mL (0 mLs Intravenous Stopped 9/6/22 2142)   metoclopramide HCl injection 10 mg (10 mg Intravenous Given 9/6/22 2104)     Medical Decision Making:   History:   Old Medical Records: I decided to obtain old medical records.  Old Records Summarized: records from clinic visits.  Independently Interpreted Test(s):   I have ordered and independently interpreted EKG Reading(s) - see summary below       <> Summary of EKG Reading(s): NSR 63 bpm. No STEMI  Clinical Tests:   Lab Tests: Ordered and Reviewed  Radiological Study: Ordered and Reviewed  Medical Tests: Ordered and Reviewed     APC / Resident Notes:   32 y.o. female with medical history of anxiety, migraine presenting to the ED from  c/o difficulty concentrating, occipital headache after syncopal and collapse episode yesterday morning. DDx includes but not limited to vasovagal episode, concussion, traumatic brain injury, migraine, symptomatic anemia, dehydration, electrolyte disturbance.              Work-up reviewed. CBC and CMP unremarkable. ECG without significant arrhythmia or ischemic findings. CT head without acute traumatic process. Incidental remote lacunar infarction findings of R frontal lobe seen which I discussed with the patient. Neurovascularly intact and no unilateral aspect of her symptoms. Do not suspect acute CVA. She is feeling better after IVF and Reglan. Discussed possibility of concussion. Advised PCP follow-up within the next week. Patient expresses understanding and agreeable to the plan. Return to ED precautions given for new, worsening, or concerning symptoms.    Clinical Impression:   Final diagnoses:  [R55] Syncope  [R51.9] Nonintractable headache, unspecified chronicity pattern, unspecified headache type (Primary)        ED Disposition Condition    Discharge Stable          ED Prescriptions    None       Follow-up Information       Follow up With Specialties Details Why  Contact Info Additional Information    [facility]   [address]  [facility] [address] [phone] [facility] Please park in surface lot and check in at central registration desk    [facility]    [address]  [facility] [address]  [phone]                Yonas Mancilla PA-C  09/07/22 0044

## 2022-09-13 ENCOUNTER — HOSPITAL ENCOUNTER (OUTPATIENT)
Dept: RADIOLOGY | Facility: OTHER | Age: 32
Discharge: HOME | End: 2022-09-13
Attending: OBSTETRICS & GYNECOLOGY
Payer: COMMERCIAL

## 2022-09-13 DIAGNOSIS — Z15.01 BRCA2 POSITIVE: ICD-10-CM

## 2022-09-13 DIAGNOSIS — Z15.09 BRCA2 POSITIVE: ICD-10-CM

## 2022-09-13 PROCEDURE — 76830 TRANSVAGINAL US NON-OB: CPT | Mod: 26,,, | Performed by: RADIOLOGY

## 2022-09-13 PROCEDURE — 76830 TRANSVAGINAL US NON-OB: CPT | Mod: TC

## 2022-09-13 PROCEDURE — 76856 US EXAM PELVIC COMPLETE: CPT | Mod: 26,,, | Performed by: RADIOLOGY

## 2022-09-13 PROCEDURE — 76856 US PELVIS COMP WITH TRANSVAG NON-OB (XPD): ICD-10-PCS | Mod: 26,,, | Performed by: RADIOLOGY

## 2022-09-13 PROCEDURE — 76830 US PELVIS COMP WITH TRANSVAG NON-OB (XPD): ICD-10-PCS | Mod: 26,,, | Performed by: RADIOLOGY

## 2022-09-14 ENCOUNTER — OFFICE VISIT (OUTPATIENT)
Dept: GYNECOLOGIC ONCOLOGY | Facility: CLINIC | Age: 32
End: 2022-09-14
Payer: COMMERCIAL

## 2022-09-14 VITALS
WEIGHT: 124.5 LBS | HEART RATE: 82 BPM | HEIGHT: 66 IN | BODY MASS INDEX: 20.01 KG/M2 | RESPIRATION RATE: 16 BRPM | DIASTOLIC BLOOD PRESSURE: 66 MMHG | SYSTOLIC BLOOD PRESSURE: 99 MMHG

## 2022-09-14 DIAGNOSIS — Z15.01 BRCA2 POSITIVE: Primary | ICD-10-CM

## 2022-09-14 DIAGNOSIS — Z15.09 BRCA2 POSITIVE: Primary | ICD-10-CM

## 2022-09-14 PROCEDURE — 99999 PR PBB SHADOW E&M-EST. PATIENT-LVL III: ICD-10-PCS | Mod: PBBFAC,,, | Performed by: OBSTETRICS & GYNECOLOGY

## 2022-09-14 PROCEDURE — 99214 PR OFFICE/OUTPT VISIT, EST, LEVL IV, 30-39 MIN: ICD-10-PCS | Mod: S$GLB,,, | Performed by: OBSTETRICS & GYNECOLOGY

## 2022-09-14 PROCEDURE — 99999 PR PBB SHADOW E&M-EST. PATIENT-LVL III: CPT | Mod: PBBFAC,,, | Performed by: OBSTETRICS & GYNECOLOGY

## 2022-09-14 PROCEDURE — 99214 OFFICE O/P EST MOD 30 MIN: CPT | Mod: S$GLB,,, | Performed by: OBSTETRICS & GYNECOLOGY

## 2022-09-23 NOTE — PROGRESS NOTES
Subjective:      Patient ID: Haley Henriquez is a 32 y.o. female.    Chief Complaint: BRCA2 positive      HPI  Presents today for follow up BRCA2.   Has a copper IUD in place 6/2022 9/2022   Pelvic US unremarkable   14    Last breast imaging 10/2021      Referral history:  Referred by Dr. Dillon for BRCA2 mutation and ovarian cancer risk reduction management.      Recently moved from [facility]. She was tested for BRCA after a positive test in her family in 2013. She has 1 aunt with breast cancer and mother with ovarian cancer.  Also a patient of Dr Oliva. P0 and desires fertility. Currently on OCPs for contraception.       12/2019 pap and HPV negative.   Review of Systems   Constitutional:  Negative for appetite change, chills, fatigue and fever.   HENT:  Negative for mouth sores.    Respiratory:  Negative for cough and shortness of breath.    Cardiovascular:  Negative for leg swelling.   Gastrointestinal:  Negative for abdominal pain, blood in stool, constipation and diarrhea.   Endocrine: Negative for cold intolerance.   Genitourinary:  Negative for dysuria and vaginal bleeding.   Musculoskeletal:  Negative for myalgias.   Skin:  Negative for rash.   Allergic/Immunologic: Negative.    Neurological:  Negative for weakness and numbness.   Hematological:  Negative for adenopathy. Does not bruise/bleed easily.   Psychiatric/Behavioral:  Negative for confusion.      Objective:   Physical Exam:   Constitutional: She is oriented to person, place, and time. She appears well-developed and well-nourished.    HENT:   Head: Normocephalic and atraumatic.    Eyes: Pupils are equal, round, and reactive to light. EOM are normal.    Neck: No thyromegaly present.    Cardiovascular:  Normal rate, regular rhythm and intact distal pulses.             Pulmonary/Chest: Effort normal and breath sounds normal. No respiratory distress. She has no wheezes.        Abdominal: Soft. Bowel sounds are normal. She exhibits no  distension and no mass. There is no abdominal tenderness.             Musculoskeletal: Normal range of motion and moves all extremeties.      Lymphadenopathy:     She has no cervical adenopathy.        Right: No supraclavicular adenopathy present.        Left: No supraclavicular adenopathy present.    Neurological: She is alert and oriented to person, place, and time.    Skin: Skin is warm and dry. No rash noted.    Psychiatric: She has a normal mood and affect.     Assessment:     1. BRCA2 positive        Plan:     Orders Placed This Encounter   Procedures    US Transvaginal Non OB        Ambulatory referral/consult to Breast Surgery     Pelvic US and  within normal limits.   RTC 6 months or sooner if needed.  Last breast imaging 10/2021. Prior patient of Dr. Dillon. Will refer back to breast team.      I spent approximately 30 minutes reviewing the available records and evaluating the patient, out of which over 50% of the time was spent face to face with the patient in counseling and coordinating this patient's care.

## 2022-09-26 ENCOUNTER — TELEPHONE (OUTPATIENT)
Dept: SURGERY | Facility: CLINIC | Age: 32
End: 2022-09-26
Payer: COMMERCIAL

## 2022-09-30 ENCOUNTER — TELEPHONE (OUTPATIENT)
Dept: SURGERY | Facility: CLINIC | Age: 32
End: 2022-09-30
Payer: COMMERCIAL

## 2022-10-01 ENCOUNTER — PATIENT MESSAGE (OUTPATIENT)
Dept: GYNECOLOGIC ONCOLOGY | Facility: CLINIC | Age: 32
End: 2022-10-01
Payer: COMMERCIAL

## 2022-10-03 ENCOUNTER — TELEPHONE (OUTPATIENT)
Dept: GYNECOLOGIC ONCOLOGY | Facility: CLINIC | Age: 32
End: 2022-10-03
Payer: COMMERCIAL

## 2022-10-03 DIAGNOSIS — Z15.01 BRCA2 POSITIVE: Primary | ICD-10-CM

## 2022-10-03 DIAGNOSIS — Z15.09 BRCA2 POSITIVE: Primary | ICD-10-CM

## 2022-10-18 ENCOUNTER — TELEPHONE (OUTPATIENT)
Dept: NEUROLOGY | Facility: CLINIC | Age: 32
End: 2022-10-18
Payer: COMMERCIAL

## 2022-10-18 NOTE — TELEPHONE ENCOUNTER
----- Message from Zeb Rae sent at 10/12/2022 10:37 AM CDT -----  Regarding: Appointment  Contact: [phone]  Pt is calling stat she recently had a concussion due to a fall. She explained that she went to the ER because she has headaches that last over 24 hrs. The ER did a CT and the results showed she may have had a potential stroke but they were not concerned with that reading. They informed her that she should get a brain MRI. Please call to discuss further @ [phone]

## 2022-10-19 ENCOUNTER — PATIENT MESSAGE (OUTPATIENT)
Dept: PSYCHIATRY | Facility: CLINIC | Age: 32
End: 2022-10-19
Payer: COMMERCIAL

## 2022-10-26 ENCOUNTER — HOSPITAL ENCOUNTER (OUTPATIENT)
Dept: RADIOLOGY | Facility: OTHER | Age: 32
Discharge: HOME | End: 2022-10-26
Attending: OBSTETRICS & GYNECOLOGY
Payer: COMMERCIAL

## 2022-10-26 DIAGNOSIS — Z15.01 BRCA2 POSITIVE: ICD-10-CM

## 2022-10-26 DIAGNOSIS — Z15.09 BRCA2 POSITIVE: ICD-10-CM

## 2022-10-26 PROCEDURE — 77063 MAMMO DIGITAL SCREENING BILAT WITH TOMO: ICD-10-PCS | Mod: 26,,, | Performed by: RADIOLOGY

## 2022-10-26 PROCEDURE — 77067 SCR MAMMO BI INCL CAD: CPT | Mod: 26,,, | Performed by: RADIOLOGY

## 2022-10-26 PROCEDURE — 77063 BREAST TOMOSYNTHESIS BI: CPT | Mod: 26,,, | Performed by: RADIOLOGY

## 2022-10-26 PROCEDURE — 77067 MAMMO DIGITAL SCREENING BILAT WITH TOMO: ICD-10-PCS | Mod: 26,,, | Performed by: RADIOLOGY

## 2022-10-26 PROCEDURE — 77067 SCR MAMMO BI INCL CAD: CPT | Mod: TC

## 2022-10-26 PROCEDURE — 77063 BREAST TOMOSYNTHESIS BI: CPT | Mod: TC

## 2022-10-31 ENCOUNTER — PATIENT MESSAGE (OUTPATIENT)
Dept: GYNECOLOGIC ONCOLOGY | Facility: CLINIC | Age: 32
End: 2022-10-31
Payer: COMMERCIAL

## 2022-11-17 ENCOUNTER — PATIENT MESSAGE (OUTPATIENT)
Dept: NEUROLOGY | Facility: CLINIC | Age: 32
End: 2022-11-17
Payer: COMMERCIAL

## 2023-01-13 ENCOUNTER — TELEPHONE (OUTPATIENT)
Dept: NEUROLOGY | Facility: CLINIC | Age: 33
End: 2023-01-13

## 2023-01-13 NOTE — TELEPHONE ENCOUNTER
"Palliative Care Daily Progress Note     S: Medical record reviewed, followed up with Primary RN Rena and Dr Lagunas regarding patient's condition. When palliative care entered the room Tre was laying in bed resting quietly. We asked her if she ate any of her breakfast, she said no I didn't like it, we told her that she could ask for something else and she just shook her head no. I asked her if she was in pain anywhere, she said sometimes her rt leg bothers her but not at the present time. I did ask her if she had left leg pain she said she did not right now, however that it sometimes bothered her, that it would feel achy. She denied nausea at this time as well as denied Shortness of breath. I asked her if she had been using her incentive spirometer as she was coughing slightly when I was in room and she said no she did not feel like it. When I asked her if she wanted to get better and out of the hospital, she just shrugged her shoulders.      O:   Palliative Performance Scale Score:     BP (!) 194/86 (BP Location: Right arm, Patient Position: Lying) Comment: rn aware  Pulse 99   Temp 98.1 °F (36.7 °C) (Oral)   Resp 18   Ht 170.2 cm (67\")   Wt 77.5 kg (170 lb 14.4 oz)   SpO2 96%   BMI 26.77 kg/m²     Intake/Output Summary (Last 24 hours) at 10/4/2022 1327  Last data filed at 10/4/2022 1030  Gross per 24 hour   Intake 120 ml   Output 1540 ml   Net -1420 ml       PE:  General Appearance:    Awake, alert, cooperative, NAD   HEENT:    NC/AT, without obvious abnormality, EOMI, anicteric    Neck:   supple, trachea midline, no JVD   Lungs:     CTAB without w/r/r    Heart:    RRR, normal S1 and S2, no M/R/G   Abdomen:     Soft, Tender lower abdomen ND, NABS    Extremities:   Moves all extremities weakly  Trace BLE edema   Pulses:   Pulses palpable and equal bilaterally   Skin:   Pale, warm ,dry and bruised d/t falls   Neurologic:   A/Ox3, cooperative   Psych:   Calm, flat            Meds: Reviewed and changes " Left message on Ms. Henriquez' voicemail requesting a ana back in regards to scheduling an appt (I did not find a referral in chart ?)   noted.    Labs:   Results from last 7 days   Lab Units 10/04/22  0116   WBC 10*3/mm3 5.82   HEMOGLOBIN g/dL 8.9*   HEMATOCRIT % 28.7*   PLATELETS 10*3/mm3 250     Results from last 7 days   Lab Units 10/04/22  0116   SODIUM mmol/L 139   POTASSIUM mmol/L 3.6   CHLORIDE mmol/L 102   CO2 mmol/L 25.0   BUN mg/dL 23   CREATININE mg/dL 1.92*   GLUCOSE mg/dL 107*   CALCIUM mg/dL 9.1     Results from last 7 days   Lab Units 10/04/22  0116   SODIUM mmol/L 139   POTASSIUM mmol/L 3.6   CHLORIDE mmol/L 102   CO2 mmol/L 25.0   BUN mg/dL 23   CREATININE mg/dL 1.92*   CALCIUM mg/dL 9.1   BILIRUBIN mg/dL 0.6   ALK PHOS U/L 64   ALT (SGPT) U/L 7   AST (SGOT) U/L 13   GLUCOSE mg/dL 107*     Imaging Results (Last 72 Hours)     Procedure Component Value Units Date/Time    XR Chest 1 View [[id number]] Collected: 10/01/22 1403     Updated: 10/01/22 1405    Narrative:      CR Chest 1 Vw    INDICATION:   Worsening dyspnea.     COMPARISON:    Chest 9/29/2022    FINDINGS:  Portable AP view(s) of the chest.    Persistent bibasilar atelectasis/infiltrate. No large pleural effusions. Cardiomegaly stable. No pneumothorax. Left-sided pacing complex.       Impression:      No change from 9/29/2022.    Signer Name: Marvin Edward MD   Signed: 10/1/2022 2:03 PM   Workstation Name: [facility]    [facility]            Diagnostics: Reviewed    A: Tre Kinney is a 87 y.o. female admitted on 9/19/2022 due to a fall. She has a medical history CHF, chronic kidney disease, coronary artery disease s/p stenting, history of blood clots, chronic anticoagulation with Coumadin, SSS s/p pacemaker placement, chronic diastolic CHF, and iron deficiency anemia who presents today with a chief complaint of a fall. Per pt she has become progressively more weak and has experienced several falls. Pt has sustained a moderate acute superior endplate vertebral compression fracture at the L1 with about 40% loss of vertebral body height and mild  posterior osseous retropulsion of about 6 mm and also noted to have chronic compression fracture deformities involving the inferior endplates of L3 and L4 due to her first fall and from her second fall she does have a large hematoma on her right side, which led her to this admission. Pt had a hemoglobin of 6.1 for which she received 2 units of PRBC's. Pt denies any known source of bleeding and does say she had a scope(EGD colonoscopy at [facility] in 2018.  Today 10/4/2022  Pt was awake alert and oriented, again seemed in more of a down mood, Temp 98.1, HR 99 /86 received her Hydralazine, RR 18 and was saturating 96% on room air and in no distress.    P:  I was able to discuss with Tre again regarding her goals of care for herself. She has not been motivated to eat or to put forth the effort to work with therapy. I asked her why she did not want to do the things that would help her to improve and to be able to go to rehab and then hopefully home, she stated I just don't want to. We did re discuss her GOC for herself pertaining to her code status, she stated that she no longer wants to be resuscitated or to be put on a ventilator. I did discuss this conversation with her son Leno and he supports what her wishes are. Leno and the family are concerned about their mothers lack of motivation and why this changed. I did change her code status to DNR/DNI and let Rena MASTERS know. I will discuss patient with Dr Lagunas.    We will continue to follow along. Please do not hesitate to contact us regarding further sx mgmt or GOC needs, including after hours or on weekends via our on call provider at [phone].     Liyah Jaquez, APRN    10/4/2022

## 2023-01-19 ENCOUNTER — OFFICE VISIT (OUTPATIENT)
Dept: NEUROLOGY | Facility: CLINIC | Age: 33
End: 2023-01-19
Payer: COMMERCIAL

## 2023-01-19 VITALS
BODY MASS INDEX: 20.32 KG/M2 | WEIGHT: 126.44 LBS | HEART RATE: 89 BPM | SYSTOLIC BLOOD PRESSURE: 131 MMHG | HEIGHT: 66 IN | DIASTOLIC BLOOD PRESSURE: 76 MMHG

## 2023-01-19 DIAGNOSIS — I73.00 RAYNAUD'S DISEASE WITHOUT GANGRENE: ICD-10-CM

## 2023-01-19 DIAGNOSIS — I73.00 RAYNAUD'S PHENOMENON WITHOUT GANGRENE: ICD-10-CM

## 2023-01-19 DIAGNOSIS — G89.29 CHRONIC NONINTRACTABLE HEADACHE, UNSPECIFIED HEADACHE TYPE: ICD-10-CM

## 2023-01-19 DIAGNOSIS — R51.9 CHRONIC NONINTRACTABLE HEADACHE, UNSPECIFIED HEADACHE TYPE: ICD-10-CM

## 2023-01-19 DIAGNOSIS — M54.81 BILATERAL OCCIPITAL NEURALGIA: ICD-10-CM

## 2023-01-19 DIAGNOSIS — R94.02 ABNORMAL BRAIN SCAN: Primary | ICD-10-CM

## 2023-01-19 DIAGNOSIS — F41.1 GAD (GENERALIZED ANXIETY DISORDER): ICD-10-CM

## 2023-01-19 PROCEDURE — 99999 PR PBB SHADOW E&M-EST. PATIENT-LVL V: ICD-10-PCS | Mod: PBBFAC,,, | Performed by: STUDENT IN AN ORGANIZED HEALTH CARE EDUCATION/TRAINING PROGRAM

## 2023-01-19 PROCEDURE — 99205 OFFICE O/P NEW HI 60 MIN: CPT | Mod: S$GLB,,, | Performed by: STUDENT IN AN ORGANIZED HEALTH CARE EDUCATION/TRAINING PROGRAM

## 2023-01-19 PROCEDURE — 99205 PR OFFICE/OUTPT VISIT, NEW, LEVL V, 60-74 MIN: ICD-10-PCS | Mod: S$GLB,,, | Performed by: STUDENT IN AN ORGANIZED HEALTH CARE EDUCATION/TRAINING PROGRAM

## 2023-01-19 PROCEDURE — 99999 PR PBB SHADOW E&M-EST. PATIENT-LVL V: CPT | Mod: PBBFAC,,, | Performed by: STUDENT IN AN ORGANIZED HEALTH CARE EDUCATION/TRAINING PROGRAM

## 2023-01-19 RX ORDER — METHOCARBAMOL 500 MG/1
500 TABLET, FILM COATED ORAL 3 TIMES DAILY PRN
Qty: 45 TABLET | Refills: 0 | Status: SHIPPED | OUTPATIENT
Start: 2023-01-19 | End: 2023-02-18 | Stop reason: SDUPTHER

## 2023-01-19 RX ORDER — ZOLPIDEM TARTRATE 10 MG/1
TABLET ORAL NIGHTLY PRN
COMMUNITY
End: 2024-04-02

## 2023-01-19 RX ORDER — SERTRALINE HYDROCHLORIDE 50 MG/1
50 TABLET, FILM COATED ORAL DAILY
COMMUNITY
End: 2024-04-02

## 2023-01-19 NOTE — PROGRESS NOTES
"Vascular Neurology Clinic  Initial Consult    Patient Name: Haley Henriquez  MRN: [id number]    CC: Abnormal CTH    HPI: Haley Henriquez is a 32 y.o. R-handed female w/ PMH significant for anxiety, migraine presenting as a self referral status-post ER visit on 9/6/2022.  At the time of the ER visit, patient was seen s/p LOC episode. CTH was obtained to evaluate for any intracranial abnormalities w/ the findings as listed below.   She presents today to establish care and obtain further imaging to evaluate the R frontal lobe area of encephalomalacia noted on CTH.   Frequently had Has prior to the incident   - Now worse    - Wearing her mouthguard as she does have TMJ   Copper IUD   - Was on birthcontrol prior to this   She denies any episodes of unilateral weakness, numbness or any other issues that would be described as a "focal neurologic deficit".   She does report significant pain/discoloration of b/l UE digits w/ col concerning for Reynaud's syndrome - has not been evaluated by Rheumatology.   She also reports frequent HA's that had progressively become worse after the fall    - States that she thinks they are somewhat related to stress   - States that she does spend a significant amount of time behind the computer at her job   Brain Imaging  [facility] 9/5/2022  No acute intracranial abnormalities.  Small focus of encephalomalacia in the subcortical white matter right frontal lobe suggestive of possible remote lacunar infarct    Vessel Imaging  N/A  Cardiac Evaluation  N/A    Relevant Lab work   Recent Labs   Lab 01/25/23  1633   Hemoglobin A1C 5.2   LDL Cholesterol 96.2   HDL 50   Triglycerides 229 H   Cholesterol 192     NIH Stroke Scale:  Interval: baseline (upon arrival/admit)  Level of Consciousness: 0 - alert  LOC Questions: 0 - answers both correctly  LOC Commands: 0 - performs both correctly  Best Gaze: 0 - normal  Visual: 0 - no visual loss  Facial Palsy: 0 - normal  Motor Left Arm: 0 - no drift  Motor " Right Arm: 0 - no drift  Motor Left Le - no drift  Motor Right Le - no drift  Limb Ataxia: 0 - absent  Sensory: 0 - normal  Best Language: 0 - no aphasia  Dysarthria: 0 - normal articulation  Extinction and Inattention: 0 - no neglect  NIH Stroke Scale Total: 0       Review of Systems:  General: No fevers, chills  Eyes: No changes in vision  ENT: No changes in hearing  Respiratory: No SOB  CV: No chest pain, palpitations  GI: No diarrhea, blood in stool  Urinary: No dysuria, hematuria  Skin: No rashes  Neurological: No weakness, confusion  Psychiatric: No auditory nor visual hallucinations      Past Medical History  Past Medical History:   Diagnosis Date    Allergy     Anxiety     Asthma     BRCA2 positive     Depression     Headache     Hx of psychiatric care     Panic disorder     Psychiatric problem     Therapy        Medications    Current Outpatient Medications:     albuterol (PROVENTIL/VENTOLIN HFA) 90 mcg/actuation inhaler, Inhale 2 puffs into the lungs every 6 (six) hours as needed for Wheezing. Rescue, Disp: 18 g, Rfl: 3    sertraline (ZOLOFT) 50 MG tablet, Take 50 mg by mouth once daily., Disp: , Rfl:     zolpidem (AMBIEN) 10 mg Tab, Take by mouth nightly as needed., Disp: , Rfl:     albuterol (PROVENTIL) 2.5 mg /3 mL (0.083 %) nebulizer solution, Take 3 mLs (2.5 mg total) by nebulization every 6 (six) hours as needed for Wheezing or Shortness of Breath. Rescue, Disp: 75 mL, Rfl: 0    albuterol (VENTOLIN HFA) 90 mcg/actuation inhaler, Inhale 2 puffs into the lungs every 6 (six) hours as needed for Wheezing. Rescue, Disp: 18 g, Rfl: 0    azithromycin (ZITHROMAX Z-ROSALIO) 250 MG tablet, Take 2 tablets (500 mg) on  Day 1,  followed by 1 tablet (250 mg) once daily on Days 2 through 5., Disp: 6 tablet, Rfl: 0    benzonatate (TESSALON) 200 MG capsule, Take 1 capsule (200 mg total) by mouth 3 (three) times daily as needed for Cough., Disp: 30 capsule, Rfl: 0    methocarbamoL (ROBAXIN) 500 MG Tab, Take 1  "tablet (500 mg total) by mouth 3 (three) times daily as needed (Neck pain/occipital HAs)., Disp: 45 tablet, Rfl: 0    promethazine-dextromethorphan (PROMETHAZINE-DM) 6.25-15 mg/5 mL Syrp, Take 5 mLs by mouth nightly as needed (cough)., Disp: 120 mL, Rfl: 0    Current Facility-Administered Medications:     copper intrauterine device 380 square mm  mm, 380 mm, Intrauterine, , Betty Art MD, 380 mm at 06/29/22 1445  Any other notable medications as documented in HPI    Allergies  Review of patient's allergies indicates:  No Known Allergies    Social History  Social History     Socioeconomic History    Marital status: Single   Tobacco Use    Smoking status: Never    Smokeless tobacco: Never   Substance and Sexual Activity    Alcohol use: Yes     Comment: socially    Drug use: Not Currently     Frequency: 1.0 times per week     Types: Marijuana     Comment: 0nce per week or less    Sexual activity: Yes     Partners: Male     Any other notable Social History as documented in HPI.    Family History  Family History   Problem Relation Age of Onset    Breast cancer Maternal Aunt     BRCA 1/2 Mother     Ovarian cancer Maternal Grandmother     Anxiety disorder Maternal Grandmother     Depression Maternal Grandmother     Depression Father     Anxiety disorder Father     Depression Brother     Anxiety disorder Brother     Anxiety disorder Maternal Grandfather     Depression Maternal Grandfather     Anxiety disorder Paternal Grandfather     Depression Paternal Grandfather     Anxiety disorder Paternal Grandmother     Depression Paternal Grandmother     Colon cancer Neg Hx      Any other notable FMH as documented in HPI.    Physical Exam  /76   Pulse 89   Ht 5' 6" (1.676 m)   Wt 57.3 kg (126 lb 6.9 oz)   LMP 12/29/2022   BMI 20.41 kg/m²     General: Well-developed, well-groomed. No apparent distress  HENT: Normocephalic, atraumatic.    Cardiovascular: Regular rate and rhythm  Chest: No audible wheezes, " stridor, ronchi appreciated.  Musculoskeletal: No peripheral edema    Neurologic Exam: The patient is awake, alert and oriented to person, place, time and situation. Attentive, vigilant during exam. Language is fluent. Naming & repetition intact, +2-step commands.  Fund of knowledge is appropriate. Well organized thoughts.      Cranial nerves:   CN II: Visual fields are full to confrontation. Pupils are 4 mm and briskly reactive to light.  CN III, IV, VI: EOMI, no nystagmus, no ptosis  CN V: Facial sensation is intact in all 3 divisions bilaterally.  CN VII: Face is symmetric with normal eye closure and smile.  CN VIII: Hearing is normal bilaterally  CN IX, X: Palate elevates symmetrically. Phonation is normal.  CN XI: Head turning and shoulder shrug are intact  CN XII: Tongue is midline with normal movements and no atrophy.    Motor examination of all extremities demonstrates normal bulk and tone in all four limbs. There are no atrophy or fasciculations.      Left Right   Left Right   Deltoid 5/5 5/5  Hip Flexion 5/5 5/5   Biceps 5/5 5/5  Hip Extension 5/5 5/5   Triceps 5/5 5/5  Knee Flexion 5/5 5/5   Wrist Ext 5/5 5/5  Knee Extension 5/5 5/5   Finger Abd 5/5 5/5  Ankle dorsiflex 5/5 5/5       Ankle plantar flex 5/5 5/5       Sensory examination is normal light touch in BUE and BLE.  Romberg is negative.    Deep tendon reflexes are 2+ and symmetric in the upper and lower extremities bilaterally.  No clonus,.    Gait: Normal tandem, and casual gait. Heel and toe walking are normal.     Coordination: No dysmetria with finger-to-nose. Rapid alternating movements and fine finger movements are intact.       Assessment and Plan  - MRI Brain w/ and w/o contrast to evaluate the right frontal area in question  - Referral to Rheumatology   - PT referral to help with the neck pain/occipital neuralgia  - Methocarbamol 500 mg three times per day as needed for pain  - Can start with one dose at bedtime to ensure this is not too  sedating for you   - Referral to HA medicine    - 20-20-20 rule  - look away from the computer every 20 minutes, at an object 20 feet away for 20 seconds to help avoid screen related headaches    Diagnosis/Etiology: Abnormal CTH   Stroke Risk Factors: Hx of OCP use, migraine HAs      Recommendations:   Antiplatelet/Anticoagulation:  Lipid Management: Atorvastatin 40 mg QHS (long-term goal LDL < 70).   - Monitoring for liver dysfunction and myopathy is suggested for statins. To be addressed by PCP  Diabetes: Target hemoglobin A1c <7%, measured 2X/year or quarterly if not meeting goals  Hypertension: Long term goal is normotension w/ target BP of less than 130/80 mmHg  Sleep: Denies any symptoms of JAYY. Consider Sleep Medicine follow up in the future if suspicion for JAYY occurs.   Smoking: Goal is smoking cessation and encouragement not to start smoking in the future.   Diet: Discussed Mediterranean Diet recommendations (Adopted from Nohemi et al, [facility], 2018.)  - Eat primarily plant-based foods, such as fruits and vegetables, whole grains, legumes (beans) and nuts  - Limit refined carbohydrates (white pasta, bread, rice).  - Replace butter with healthy fats such as olive oil.  - Use herbs and spices instead of salt to flavor foods.  - Limit red meat and processed meats to no more than a few times a month.  - Avoid sugary sodas, bakery goods, and sweets.  - Eat fish and poultry at least twice a week.  - Get plenty of exercise (150 minutes per week).    RTC in PRN    Problem List Items Addressed This Visit    None  Visit Diagnoses       Abnormal brain scan    -  Primary    Relevant Orders    MRI Brain W WO Contrast (Completed)    Raynaud's disease without gangrene        Relevant Orders    Ambulatory referral/consult to Rheumatology    Bilateral occipital neuralgia        Relevant Orders    Ambulatory referral/consult to Physical/Occupational Therapy    Ambulatory referral/consult to Neurology                Yoko  MD Елена  Vascular Neurology  [facility]  [address] [place] [address]

## 2023-01-19 NOTE — PATIENT INSTRUCTIONS
- MRI Brain w/ and w/oo contrast to evaluate the right frontal area in question  - Referral to Rheumatology   - PT referral to help with the neck pain/occipital neuralgia  - Methocarbamol 500 mg three times per day as needed for pain  - Can start with one dose at bedtime to ensure this is not too sedating for you   - Referral to HA medicine    - 20-20-20 rule  - look away from the computer every 20 minutes, at an object 20 feet away for 20 seconds to help avoid screen related headaches

## 2023-01-23 ENCOUNTER — OFFICE VISIT (OUTPATIENT)
Dept: URGENT CARE | Facility: CLINIC | Age: 33
End: 2023-01-23
Payer: COMMERCIAL

## 2023-01-23 VITALS
OXYGEN SATURATION: 99 % | HEIGHT: 66 IN | BODY MASS INDEX: 20.25 KG/M2 | SYSTOLIC BLOOD PRESSURE: 114 MMHG | RESPIRATION RATE: 20 BRPM | HEART RATE: 81 BPM | DIASTOLIC BLOOD PRESSURE: 78 MMHG | TEMPERATURE: 98 F | WEIGHT: 126 LBS

## 2023-01-23 DIAGNOSIS — J20.8 ACUTE BACTERIAL BRONCHITIS: Primary | ICD-10-CM

## 2023-01-23 DIAGNOSIS — J45.21 MILD INTERMITTENT ASTHMA WITH EXACERBATION: ICD-10-CM

## 2023-01-23 DIAGNOSIS — R05.9 COUGH, UNSPECIFIED TYPE: ICD-10-CM

## 2023-01-23 DIAGNOSIS — B96.89 ACUTE BACTERIAL BRONCHITIS: Primary | ICD-10-CM

## 2023-01-23 LAB
CTP QC/QA: YES
SARS-COV-2 AG RESP QL IA.RAPID: NEGATIVE

## 2023-01-23 PROCEDURE — 99213 PR OFFICE/OUTPT VISIT, EST, LEVL III, 20-29 MIN: ICD-10-PCS | Mod: 25,S$GLB,, | Performed by: NURSE PRACTITIONER

## 2023-01-23 PROCEDURE — 94640 PR INHAL RX, AIRWAY OBST/DX SPUTUM INDUCT: ICD-10-PCS | Mod: S$GLB,,, | Performed by: NURSE PRACTITIONER

## 2023-01-23 PROCEDURE — 94640 AIRWAY INHALATION TREATMENT: CPT | Mod: S$GLB,,, | Performed by: NURSE PRACTITIONER

## 2023-01-23 PROCEDURE — 87811 SARS-COV-2 COVID19 W/OPTIC: CPT | Mod: QW,S$GLB,, | Performed by: NURSE PRACTITIONER

## 2023-01-23 PROCEDURE — 99213 OFFICE O/P EST LOW 20 MIN: CPT | Mod: 25,S$GLB,, | Performed by: NURSE PRACTITIONER

## 2023-01-23 PROCEDURE — 87811 SARS CORONAVIRUS 2 ANTIGEN POCT, MANUAL READ: ICD-10-PCS | Mod: QW,S$GLB,, | Performed by: NURSE PRACTITIONER

## 2023-01-23 RX ORDER — ALBUTEROL SULFATE 90 UG/1
2 AEROSOL, METERED RESPIRATORY (INHALATION) EVERY 6 HOURS PRN
Qty: 18 G | Refills: 0 | Status: SHIPPED | OUTPATIENT
Start: 2023-01-23 | End: 2024-01-23

## 2023-01-23 RX ORDER — BENZONATATE 200 MG/1
200 CAPSULE ORAL 3 TIMES DAILY PRN
Qty: 30 CAPSULE | Refills: 0 | Status: SHIPPED | OUTPATIENT
Start: 2023-01-23 | End: 2023-02-02

## 2023-01-23 RX ORDER — ALBUTEROL SULFATE 0.83 MG/ML
2.5 SOLUTION RESPIRATORY (INHALATION)
Status: COMPLETED | OUTPATIENT
Start: 2023-01-23 | End: 2023-01-23

## 2023-01-23 RX ORDER — ALBUTEROL SULFATE 0.83 MG/ML
2.5 SOLUTION RESPIRATORY (INHALATION) EVERY 6 HOURS PRN
Qty: 75 ML | Refills: 0 | Status: SHIPPED | OUTPATIENT
Start: 2023-01-23 | End: 2023-02-22

## 2023-01-23 RX ORDER — PREDNISONE 20 MG/1
20 TABLET ORAL DAILY
Qty: 3 TABLET | Refills: 0 | Status: SHIPPED | OUTPATIENT
Start: 2023-01-23 | End: 2023-01-26

## 2023-01-23 RX ORDER — PROMETHAZINE HYDROCHLORIDE AND DEXTROMETHORPHAN HYDROBROMIDE 6.25; 15 MG/5ML; MG/5ML
5 SYRUP ORAL NIGHTLY PRN
Qty: 120 ML | Refills: 0 | Status: SHIPPED | OUTPATIENT
Start: 2023-01-23 | End: 2023-02-02

## 2023-01-23 RX ORDER — AZITHROMYCIN 250 MG/1
TABLET, FILM COATED ORAL
Qty: 6 TABLET | Refills: 0 | Status: SHIPPED | OUTPATIENT
Start: 2023-01-23 | End: 2024-04-02

## 2023-01-23 RX ADMIN — ALBUTEROL SULFATE 2.5 MG: 0.83 SOLUTION RESPIRATORY (INHALATION) at 10:01

## 2023-01-23 NOTE — LETTER
[address] [facility] [address]  Phone: [phone]  Fax: [phone]          Return to Work/School    Patient: Haley Henriquez  YOB: 1990   Date: 01/23/2023     To Whom It May Concern:     Haley Henriquez was in contact with/seen in my office on 01/23/2023. COVID-19 is present in our communities across the [facility]. There is limited testing for COVID at this time, so not all patients can be tested. In this situation, your employee meets the following criteria:     Haley Henriquez has met the criteria for COVID-19 testing and has a NEGATIVE result. The employee can return to work once they are asymptomatic for 24 hours without the use of fever reducing medications (Tylenol, Motrin, etc).     If you have any questions or concerns, or if I can be of further assistance, please do not hesitate to contact me.     Sincerely,    Sigrid Arndt NP

## 2023-01-23 NOTE — PROGRESS NOTES
"Subjective:       Patient ID: Haley Henriquez is a 32 y.o. female.    Vitals:  height is 5' 6" (1.676 m) and weight is 57.2 kg (126 lb). Her temperature is 98.4 °F (36.9 °C). Her blood pressure is 114/78 and her pulse is 81. Her respiration is 20 and oxygen saturation is 99%.     Chief Complaint: Cough    Pt is coming in for cough and  chest pains   Pt says that she treated with tylenol cold and flu   Pt says that her syms started on Friday   Pt pain level is a 5    Provider note begins below    History of asthma.  She is not used her inhaler in a year.  Reports chest tightness cough and binder sore throat.  Denies concern for flu or COVID.  Reports a history of bronchitis.    Cough  This is a new problem. Episode onset: 3 days ago. The problem has been unchanged. The problem occurs constantly. The cough is Non-productive. Associated symptoms include chest pain, postnasal drip and wheezing. Pertinent negatives include no fever, hemoptysis or shortness of breath. Nothing aggravates the symptoms. She has tried OTC cough suppressant for the symptoms. The treatment provided no relief.     Constitution: Positive for fatigue. Negative for fever.   HENT:  Positive for postnasal drip.    Cardiovascular:  Positive for chest pain. Negative for sob on exertion.   Respiratory:  Positive for chest tightness, cough and wheezing. Negative for sputum production, bloody sputum and shortness of breath.    Gastrointestinal:  Negative for nausea, vomiting, constipation and diarrhea.     Objective:      Physical Exam   Constitutional: She is oriented to person, place, and time.   HENT:   Head: Normocephalic and atraumatic.   Ears:   Right Ear: Tympanic membrane, external ear and ear canal normal.   Left Ear: Tympanic membrane, external ear and ear canal normal.   Nose: No rhinorrhea or congestion.   Mouth/Throat: No oropharyngeal exudate or posterior oropharyngeal erythema.   Cardiovascular: Normal rate and regular rhythm. "   Pulmonary/Chest: Effort normal. No stridor. No respiratory distress. She has no wheezes. She has no rhonchi. She has no rales. She exhibits no tenderness.   Abdominal: Normal appearance.   Neurological: She is alert and oriented to person, place, and time.   Skin: Skin is warm and dry.   Psychiatric: Her behavior is normal. Mood normal.         Results for orders placed or performed in visit on 01/23/23   SARS Coronavirus 2 Antigen, POCT Manual Read   Result Value Ref Range    SARS Coronavirus 2 Antigen Negative Negative     Acceptable Yes       Assessment:       1. Acute bacterial bronchitis    2. Cough, unspecified type          Plan:       Covid test negative  Albuterol in clinic   Albuterol inhaler   Short course of steroids and cough medications.  If not improving may start antibiotics          Acute bacterial bronchitis  -     albuterol nebulizer solution 2.5 mg  -     albuterol (VENTOLIN HFA) 90 mcg/actuation inhaler; Inhale 2 puffs into the lungs every 6 (six) hours as needed for Wheezing. Rescue  Dispense: 18 g; Refill: 0  -     benzonatate (TESSALON) 200 MG capsule; Take 1 capsule (200 mg total) by mouth 3 (three) times daily as needed for Cough.  Dispense: 30 capsule; Refill: 0  -     promethazine-dextromethorphan (PROMETHAZINE-DM) 6.25-15 mg/5 mL Syrp; Take 5 mLs by mouth nightly as needed (cough).  Dispense: 120 mL; Refill: 0  -     predniSONE (DELTASONE) 20 MG tablet; Take 1 tablet (20 mg total) by mouth once daily. for 3 days  Dispense: 3 tablet; Refill: 0  -     azithromycin (ZITHROMAX Z-ROSALIO) 250 MG tablet; Take 2 tablets (500 mg) on  Day 1,  followed by 1 tablet (250 mg) once daily on Days 2 through 5.  Dispense: 6 tablet; Refill: 0    Cough, unspecified type  -     SARS Coronavirus 2 Antigen, POCT Manual Read

## 2023-01-24 ENCOUNTER — HOSPITAL ENCOUNTER (OUTPATIENT)
Dept: RADIOLOGY | Facility: HOSPITAL | Age: 33
Discharge: HOME | End: 2023-01-24
Attending: STUDENT IN AN ORGANIZED HEALTH CARE EDUCATION/TRAINING PROGRAM
Payer: COMMERCIAL

## 2023-01-24 DIAGNOSIS — R94.02 ABNORMAL BRAIN SCAN: ICD-10-CM

## 2023-01-24 PROCEDURE — 25500020 PHARM REV CODE 255: Performed by: STUDENT IN AN ORGANIZED HEALTH CARE EDUCATION/TRAINING PROGRAM

## 2023-01-24 PROCEDURE — A9585 GADOBUTROL INJECTION: HCPCS | Performed by: STUDENT IN AN ORGANIZED HEALTH CARE EDUCATION/TRAINING PROGRAM

## 2023-01-24 PROCEDURE — 70553 MRI BRAIN STEM W/O & W/DYE: CPT | Mod: 26,,, | Performed by: RADIOLOGY

## 2023-01-24 PROCEDURE — 70553 MRI BRAIN STEM W/O & W/DYE: CPT | Mod: TC

## 2023-01-24 PROCEDURE — 70553 MRI BRAIN W WO CONTRAST: ICD-10-PCS | Mod: 26,,, | Performed by: RADIOLOGY

## 2023-01-24 RX ORDER — GADOBUTROL 604.72 MG/ML
7 INJECTION INTRAVENOUS
Status: COMPLETED | OUTPATIENT
Start: 2023-01-24 | End: 2023-01-24

## 2023-01-24 RX ADMIN — GADOBUTROL 7 ML: 604.72 INJECTION INTRAVENOUS at 05:01

## 2023-01-25 ENCOUNTER — OFFICE VISIT (OUTPATIENT)
Dept: INTERNAL MEDICINE | Facility: CLINIC | Age: 33
End: 2023-01-25
Attending: FAMILY MEDICINE
Payer: COMMERCIAL

## 2023-01-25 ENCOUNTER — LAB VISIT (OUTPATIENT)
Dept: LAB | Facility: OTHER | Age: 33
End: 2023-01-25
Attending: FAMILY MEDICINE
Payer: COMMERCIAL

## 2023-01-25 VITALS
HEART RATE: 76 BPM | SYSTOLIC BLOOD PRESSURE: 90 MMHG | BODY MASS INDEX: 20.11 KG/M2 | DIASTOLIC BLOOD PRESSURE: 60 MMHG | OXYGEN SATURATION: 98 % | HEIGHT: 66 IN | WEIGHT: 125.13 LBS

## 2023-01-25 DIAGNOSIS — Z00.00 PREVENTATIVE HEALTH CARE: Primary | ICD-10-CM

## 2023-01-25 DIAGNOSIS — Z15.01 BRCA2 POSITIVE: ICD-10-CM

## 2023-01-25 DIAGNOSIS — Z00.00 PREVENTATIVE HEALTH CARE: ICD-10-CM

## 2023-01-25 DIAGNOSIS — F32.A DEPRESSION, UNSPECIFIED DEPRESSION TYPE: ICD-10-CM

## 2023-01-25 DIAGNOSIS — Z15.09 BRCA2 POSITIVE: ICD-10-CM

## 2023-01-25 LAB
ALBUMIN SERPL BCP-MCNC: 4.6 G/DL (ref 3.5–5.2)
ALP SERPL-CCNC: 54 U/L (ref 55–135)
ALT SERPL W/O P-5'-P-CCNC: 13 U/L (ref 10–44)
ANION GAP SERPL CALC-SCNC: 6 MMOL/L (ref 8–16)
AST SERPL-CCNC: 14 U/L (ref 10–40)
BILIRUB SERPL-MCNC: 0.3 MG/DL (ref 0.1–1)
BUN SERPL-MCNC: 9 MG/DL (ref 6–20)
CALCIUM SERPL-MCNC: 10 MG/DL (ref 8.7–10.5)
CHLORIDE SERPL-SCNC: 105 MMOL/L (ref 95–110)
CHOLEST SERPL-MCNC: 192 MG/DL (ref 120–199)
CHOLEST/HDLC SERPL: 3.8 {RATIO} (ref 2–5)
CO2 SERPL-SCNC: 28 MMOL/L (ref 23–29)
CREAT SERPL-MCNC: 0.8 MG/DL (ref 0.5–1.4)
EST. GFR  (NO RACE VARIABLE): >60 ML/MIN/1.73 M^2
ESTIMATED AVG GLUCOSE: 103 MG/DL (ref 68–131)
GLUCOSE SERPL-MCNC: 79 MG/DL (ref 70–110)
HBA1C MFR BLD: 5.2 % (ref 4–5.6)
HDLC SERPL-MCNC: 50 MG/DL (ref 40–75)
HDLC SERPL: 26 % (ref 20–50)
LDLC SERPL CALC-MCNC: 96.2 MG/DL (ref 63–159)
NONHDLC SERPL-MCNC: 142 MG/DL
POTASSIUM SERPL-SCNC: 4.3 MMOL/L (ref 3.5–5.1)
PROT SERPL-MCNC: 8 G/DL (ref 6–8.4)
SODIUM SERPL-SCNC: 139 MMOL/L (ref 136–145)
TRIGL SERPL-MCNC: 229 MG/DL (ref 30–150)
TSH SERPL DL<=0.005 MIU/L-ACNC: 1.31 UIU/ML (ref 0.4–4)

## 2023-01-25 PROCEDURE — 80053 COMPREHEN METABOLIC PANEL: CPT | Performed by: FAMILY MEDICINE

## 2023-01-25 PROCEDURE — 99999 PR PBB SHADOW E&M-EST. PATIENT-LVL IV: CPT | Mod: PBBFAC,,, | Performed by: FAMILY MEDICINE

## 2023-01-25 PROCEDURE — 80061 LIPID PANEL: CPT | Performed by: FAMILY MEDICINE

## 2023-01-25 PROCEDURE — 99395 PREV VISIT EST AGE 18-39: CPT | Mod: S$GLB,,, | Performed by: FAMILY MEDICINE

## 2023-01-25 PROCEDURE — 83036 HEMOGLOBIN GLYCOSYLATED A1C: CPT | Performed by: FAMILY MEDICINE

## 2023-01-25 PROCEDURE — 84443 ASSAY THYROID STIM HORMONE: CPT | Performed by: FAMILY MEDICINE

## 2023-01-25 PROCEDURE — 36415 COLL VENOUS BLD VENIPUNCTURE: CPT | Performed by: FAMILY MEDICINE

## 2023-01-25 PROCEDURE — 99395 PR PREVENTIVE VISIT,EST,18-39: ICD-10-PCS | Mod: S$GLB,,, | Performed by: FAMILY MEDICINE

## 2023-01-25 PROCEDURE — 99999 PR PBB SHADOW E&M-EST. PATIENT-LVL IV: ICD-10-PCS | Mod: PBBFAC,,, | Performed by: FAMILY MEDICINE

## 2023-01-25 NOTE — PROGRESS NOTES
"  CHIEF COMPLAINT: Establish a primary care physician    HISTORY OF PRESENT ILLNESS: The patient is a generally healthy 32 year-old.  The patient has no specific complaints today.  It has been a while since the patient has seen a primary care physician.  The patient wishes to establish a primary care physician.  The patient would also like to get some basic blood work done.    BRCA2 pos    NL MRI the other day    REVIEW OF SYSTEMS:  GENERAL: No fever, chills, fatigability or weight loss.  SKIN: No rashes, itching or changes in color or texture of skin.  HEAD: No recent head trauma.  EYES: Visual acuity fine. No photophobia, ocular pain or diplopia.  EARS: Denies ear pain, discharge or vertigo.  NOSE: No loss of smell, no epistaxis or postnasal drip.  MOUTH & THROAT: No hoarseness or change in voice. No excessive gum bleeding.  NODES: Denies swollen glands.  CHEST: Denies BRANCH, cyanosis, wheezing, cough and sputum production.  CARDIOVASCULAR: Denies chest pain, PND, orthopnea or reduced exercise tolerance.  ABDOMEN: Appetite fine. No weight loss. Denies diarrhea, abdominal pain, hematemesis or blood in stool.  URINARY: No flank pain, dysuria or hematuria.  PERIPHERAL VASCULAR: No claudication or cyanosis.  MUSCULOSKELETAL: No joint stiffness or swelling. Denies back pain.  NEUROLOGIC: No history of seizures, paralysis, alteration of gait or coordination.    SOCIAL HISTORY: The patient does not smoke.  The patient consumes alcohol socially.  The patient is single.    PHYSICAL EXAMINATION:   Blood pressure 90/60, pulse 76, height 5' 6" (1.676 m), weight 56.8 kg (125 lb 1.8 oz), last menstrual period 12/29/2022, SpO2 98 %.  APPEARANCE: Well nourished, well developed, in no acute distress.    HEAD: Normocephalic, atraumatic.  EYES: PERRL. EOMI.  Conjunctivae without injection and  anicteric  NOSE: Mucosa pink. Airway clear.  MOUTH & THROAT: No tonsillar enlargement. No pharyngeal erythema or exudate. No stridor.  NECK: " Supple.   NODES: No cervical, axillary or inguinal lymph node enlargement.  CHEST: Lungs clear to auscultation.  No retractions are noted.  No rales or rhonchi are present.  CARDIOVASCULAR: Normal S1, S2. No rubs, murmurs or gallops.  ABDOMEN: Bowel sounds normal. Not distended. Soft. No tenderness or masses.  No ascites is noted.  MUSCULOSKELETAL:  There is no clubbing, cyanosis, or edema of the extremities x4.  There is full range of motion of the lumbar spine.  There is full range of motion of the extremities x4.  There is no deformity noted.    NEUROLOGIC:       Normal speech development.      Hearing normal.      Normal gait.      Motor and sensory exams grossly normal.  PSYCHIATRIC: Patient is alert and oriented x3.  Thought processes are all normal.  There is no homicidality.  There is no suicidality.  There is no evidence of psychosis.    LABORATORY/RADIOLOGY:   Chart reviewed.  We will update blood work today.    ASSESSMENT:   Annual   BRCA2 positive    PLAN:  We will follow-up blood work which we expect to be normal.    Return to clinic in one year.

## 2023-01-27 ENCOUNTER — PATIENT MESSAGE (OUTPATIENT)
Dept: INTERNAL MEDICINE | Facility: CLINIC | Age: 33
End: 2023-01-27
Payer: COMMERCIAL

## 2023-01-30 NOTE — PATIENT INSTRUCTIONS
PLEASE GO DIRECTLY TO [facility] FOR FURTHER EVALUATION/HEAD CT.   Cibinqo Counseling: I discussed with the patient the risks of Cibinqo therapy including but not limited to common cold, nausea, headache, cold sores, increased blood CPK levels, dizziness, UTIs, fatigue, acne, and vomitting. Live vaccines should be avoided.  This medication has been linked to serious infections; higher rate of mortality; malignancy and lymphoproliferative disorders; major adverse cardiovascular events; thrombosis; thrombocytopenia and lymphopenia; lipid elevations; and retinal detachment.

## 2023-01-31 PROBLEM — R51.9 CHRONIC NONINTRACTABLE HEADACHE: Status: ACTIVE | Noted: 2023-01-31

## 2023-01-31 PROBLEM — G89.29 CHRONIC NONINTRACTABLE HEADACHE: Status: ACTIVE | Noted: 2023-01-31

## 2023-01-31 PROBLEM — R94.02 ABNORMAL BRAIN SCAN: Status: ACTIVE | Noted: 2023-01-31

## 2023-01-31 PROBLEM — I73.00 RAYNAUD'S PHENOMENON WITHOUT GANGRENE: Status: ACTIVE | Noted: 2023-01-31

## 2023-01-31 PROBLEM — M54.81 BILATERAL OCCIPITAL NEURALGIA: Status: ACTIVE | Noted: 2023-01-31

## 2023-02-01 ENCOUNTER — PATIENT MESSAGE (OUTPATIENT)
Dept: NEUROLOGY | Facility: CLINIC | Age: 33
End: 2023-02-01
Payer: COMMERCIAL

## 2023-03-21 NOTE — PROGRESS NOTES
Subjective:      Patient ID: Haley Henriquez is a 33 y.o. female.    Chief Complaint: Follow-up (6 month )      HPI  Presents today for follow up BRCA2.   Has a copper IUD in place 6/2022 9/2022   Pelvic US unremarkable   14    3/2023  Pelvic US unremarkable     12     Last breast imaging [facility] 10/2022    Pap/HPV 5/2022 negative.     ______________________________    Referral history:  Referred by Dr. Dillon for BRCA2 mutation and ovarian cancer risk reduction management.      Recently moved from [facility]. She was tested for BRCA after a positive test in her family in 2013. She has 1 aunt with breast cancer and mother with ovarian cancer.  Also a patient of Dr Oliva. P0 and desires fertility. Currently on OCPs for contraception.       12/2019 pap and HPV negative.   Review of Systems   Constitutional:  Negative for appetite change, chills, fatigue and fever.   HENT:  Negative for mouth sores.    Respiratory:  Negative for cough and shortness of breath.    Cardiovascular:  Negative for leg swelling.   Gastrointestinal:  Negative for abdominal pain, blood in stool, constipation and diarrhea.   Endocrine: Negative for cold intolerance.   Genitourinary:  Negative for dysuria and vaginal bleeding.   Musculoskeletal:  Negative for myalgias.   Skin:  Negative for rash.   Allergic/Immunologic: Negative.    Neurological:  Negative for weakness and numbness.   Hematological:  Negative for adenopathy. Does not bruise/bleed easily.   Psychiatric/Behavioral:  Negative for confusion.      Objective:   Physical Exam:   Constitutional: She is oriented to person, place, and time. She appears well-developed and well-nourished.    HENT:   Head: Normocephalic and atraumatic.    Eyes: Pupils are equal, round, and reactive to light. EOM are normal.    Neck: No thyromegaly present.    Cardiovascular:  Normal rate, regular rhythm and intact distal pulses.             Pulmonary/Chest: Effort normal and breath sounds  normal. No respiratory distress. She has no wheezes.        Abdominal: Soft. Bowel sounds are normal. She exhibits no distension and no mass. There is no abdominal tenderness.     Genitourinary:    Vagina normal.      Pelvic exam was performed with patient supine.   There is no lesion on the right labia. There is no lesion on the left labia. Cervix is normal. Right adnexum displays no mass. Left adnexum displays no mass. IUD strings visualized.          Musculoskeletal: Normal range of motion and moves all extremeties.      Lymphadenopathy:     She has no cervical adenopathy.        Right: No supraclavicular adenopathy present.        Left: No supraclavicular adenopathy present.    Neurological: She is alert and oriented to person, place, and time.    Skin: Skin is warm and dry. No rash noted.    Psychiatric: She has a normal mood and affect.     Assessment:     1. BRCA2 positive        Plan:     Orders Placed This Encounter   Procedures    US Transvaginal Non OB    MRI Breast w/wo Contrast, w/CAD, Bilateral         Pelvic US and  within normal limits as above.   RTC 6 months or sooner if needed with Pelvic US and   Breast MRI due  Recommend continued follow up until ready for risk reducing surgery.      I spent approximately 30 minutes reviewing the available records and evaluating the patient, out of which over 50% of the time was spent face to face with the patient in counseling and coordinating this patient's care.

## 2023-03-22 ENCOUNTER — OFFICE VISIT (OUTPATIENT)
Dept: GYNECOLOGIC ONCOLOGY | Facility: CLINIC | Age: 33
End: 2023-03-22
Payer: COMMERCIAL

## 2023-03-22 ENCOUNTER — HOSPITAL ENCOUNTER (OUTPATIENT)
Dept: RADIOLOGY | Facility: OTHER | Age: 33
Discharge: HOME | End: 2023-03-22
Attending: OBSTETRICS & GYNECOLOGY
Payer: COMMERCIAL

## 2023-03-22 ENCOUNTER — TELEPHONE (OUTPATIENT)
Dept: NEUROLOGY | Facility: CLINIC | Age: 33
End: 2023-03-22
Payer: COMMERCIAL

## 2023-03-22 VITALS
WEIGHT: 125 LBS | HEART RATE: 56 BPM | SYSTOLIC BLOOD PRESSURE: 110 MMHG | BODY MASS INDEX: 20.18 KG/M2 | DIASTOLIC BLOOD PRESSURE: 71 MMHG

## 2023-03-22 DIAGNOSIS — Z15.01 BRCA2 POSITIVE: ICD-10-CM

## 2023-03-22 DIAGNOSIS — Z15.09 BRCA2 POSITIVE: Primary | ICD-10-CM

## 2023-03-22 DIAGNOSIS — Z15.01 BRCA2 POSITIVE: Primary | ICD-10-CM

## 2023-03-22 DIAGNOSIS — Z15.09 BRCA2 POSITIVE: ICD-10-CM

## 2023-03-22 PROCEDURE — 99214 OFFICE O/P EST MOD 30 MIN: CPT | Mod: S$GLB,,, | Performed by: OBSTETRICS & GYNECOLOGY

## 2023-03-22 PROCEDURE — 76830 TRANSVAGINAL US NON-OB: CPT | Mod: 26,,, | Performed by: RADIOLOGY

## 2023-03-22 PROCEDURE — 76830 US PELVIS COMP WITH TRANSVAG NON-OB (XPD): ICD-10-PCS | Mod: 26,,, | Performed by: RADIOLOGY

## 2023-03-22 PROCEDURE — 76856 US EXAM PELVIC COMPLETE: CPT | Mod: 26,,, | Performed by: RADIOLOGY

## 2023-03-22 PROCEDURE — 76856 US PELVIS COMP WITH TRANSVAG NON-OB (XPD): ICD-10-PCS | Mod: 26,,, | Performed by: RADIOLOGY

## 2023-03-22 PROCEDURE — 76856 US EXAM PELVIC COMPLETE: CPT | Mod: TC

## 2023-03-22 PROCEDURE — 99999 PR PBB SHADOW E&M-EST. PATIENT-LVL III: ICD-10-PCS | Mod: PBBFAC,,, | Performed by: OBSTETRICS & GYNECOLOGY

## 2023-03-22 PROCEDURE — 99999 PR PBB SHADOW E&M-EST. PATIENT-LVL III: CPT | Mod: PBBFAC,,, | Performed by: OBSTETRICS & GYNECOLOGY

## 2023-03-22 PROCEDURE — 99214 PR OFFICE/OUTPT VISIT, EST, LEVL IV, 30-39 MIN: ICD-10-PCS | Mod: S$GLB,,, | Performed by: OBSTETRICS & GYNECOLOGY

## 2023-03-22 NOTE — TELEPHONE ENCOUNTER
Staff contacted patient to inform her appointment needs to be rescheduled.     Patient reported her appointment has been rescheduled a few times. Staff went on to further explain to patient reason behind rescheduling appointment she was frustrated and began to yell then hung up the phone.

## 2023-03-22 NOTE — TELEPHONE ENCOUNTER
----- Message from Carmella Leos PA-C sent at 3/22/2023  1:54 PM CDT -----  Im fine seeing this pt  ----- Message -----  From: Pillo Zapien MA  Sent: 3/22/2023  11:33 AM CDT  To: Carmella Leos PA-C    Appropriate to see or see MD?    Dx: bilateral occipital neuralgia   ----- Message -----  From: Brenda Luciano MA  Sent: 3/22/2023  10:58 AM CDT  To: Dania Allan MA, Pillo Zapien MA    Good Morning       This patient was scheduled with Dr. Connolly for Friday. Patient reported that she was told she'd be  scheduled with a headache specialist. Staff informed her physician wasn't a specialist. Can you please contact patient to schedule at your convenience.      Thanks  Brenda MCCRACKEN

## 2023-03-22 NOTE — TELEPHONE ENCOUNTER
Attempted to call the patient, LVM to return the call. Previously scheduled incorrectly twice prior to being schedule with headache clinic

## 2023-07-08 NOTE — PROGRESS NOTES
Individual Psychotherapy (PhD/LCSW)    2/26/2019    Site:  [facility]         Therapeutic Intervention: Met with patient.  Outpatient - Insight oriented psychotherapy 30 min - CPT code 15609    Chief complaint/reason for encounter: depression, mood swings, anger, anxiety, sleep, appetite, behavior, cognition, somatic and interpersonal     Interval history and content of current session: discussed increased hair pulling, coping skills, relationship issues, family issues, father is being withholding about her marrying Francisco and she is going to talk with him tonight and be direct and honest about what she wants we are hoping the father will be changed and mellowed his position and is more supportive of her. And Francisco, hard right now from stress and her uncle is in the hospital, her mother's brother with serious medical issues, and taking care of herself, take things one at a time and job issues all focused on, suggested she consider hypnosis for hair loss and we can try this a few times, she was in agreement, call if needed.    Treatment plan:  · Target symptoms: depression, recurrent depression, anxiety , mood swings, mood disorder, adjustment  · Why chosen therapy is appropriate versus another modality: relevant to diagnosis, patient responds to this modality, evidence based practice  · Outcome monitoring methods: self-report, observation  · Therapeutic intervention type: insight oriented psychotherapy, behavior modifying psychotherapy, supportive psychotherapy    Risk parameters:  Patient reports no suicidal ideation  Patient reports no homicidal ideation  Patient reports no self-injurious behavior  Patient reports no violent behavior    Verbal deficits: None    Patient's response to intervention:  The patient's response to intervention is accepting.    Progress toward goals and other mental status changes:  The patient's progress toward goals is fair .    Diagnosis:     ICD-10-CM ICD-9-CM   1. STEPHANIE (generalized  Pt d/c home with AVS no s.s of distress noted.  Pt denies questions about f/u care      Bertram Smiley RN  07/08/23 7660 anxiety disorder) F41.1 300.02       Plan:  individual psychotherapy, family psychotherapy and consult psychiatrist for medication evaluation    Return to clinic: 2 weeks    Length of Service (minutes): 30

## 2023-10-11 ENCOUNTER — OFFICE VISIT (OUTPATIENT)
Dept: INTERNAL MEDICINE | Facility: CLINIC | Age: 33
End: 2023-10-11
Attending: FAMILY MEDICINE
Payer: COMMERCIAL

## 2023-10-11 VITALS
DIASTOLIC BLOOD PRESSURE: 69 MMHG | SYSTOLIC BLOOD PRESSURE: 119 MMHG | WEIGHT: 125.25 LBS | OXYGEN SATURATION: 99 % | HEART RATE: 87 BPM | BODY MASS INDEX: 20.21 KG/M2

## 2023-10-11 DIAGNOSIS — M79.10 MYALGIA: ICD-10-CM

## 2023-10-11 DIAGNOSIS — J06.9 VIRAL UPPER RESPIRATORY TRACT INFECTION: Primary | ICD-10-CM

## 2023-10-11 LAB
BILIRUB SERPL-MCNC: NORMAL MG/DL
BLOOD URINE, POC: NORMAL
CLARITY, POC UA: CLEAR
COLOR, POC UA: COLORLESS
CTP QC/QA: YES
CTP QC/QA: YES
GLUCOSE UR QL STRIP: NORMAL
KETONES UR QL STRIP: NORMAL
LEUKOCYTE ESTERASE URINE, POC: NORMAL
NITRITE, POC UA: NORMAL
PH, POC UA: 7
POC MOLECULAR INFLUENZA A AGN: NEGATIVE
POC MOLECULAR INFLUENZA B AGN: NEGATIVE
PROTEIN, POC: NORMAL
SARS-COV-2 AG RESP QL IA.RAPID: NEGATIVE
SPECIFIC GRAVITY, POC UA: 1
UROBILINOGEN, POC UA: NORMAL

## 2023-10-11 PROCEDURE — 99999 PR PBB SHADOW E&M-EST. PATIENT-LVL III: ICD-10-PCS | Mod: PBBFAC,,, | Performed by: FAMILY MEDICINE

## 2023-10-11 PROCEDURE — 81002 URINALYSIS NONAUTO W/O SCOPE: CPT | Mod: S$GLB,,, | Performed by: FAMILY MEDICINE

## 2023-10-11 PROCEDURE — 87502 INFLUENZA DNA AMP PROBE: CPT | Mod: QW,S$GLB,, | Performed by: FAMILY MEDICINE

## 2023-10-11 PROCEDURE — 87502 POCT INFLUENZA A/B MOLECULAR: ICD-10-PCS | Mod: QW,S$GLB,, | Performed by: FAMILY MEDICINE

## 2023-10-11 PROCEDURE — 99214 PR OFFICE/OUTPT VISIT, EST, LEVL IV, 30-39 MIN: ICD-10-PCS | Mod: S$GLB,,, | Performed by: FAMILY MEDICINE

## 2023-10-11 PROCEDURE — 87811 SARS CORONAVIRUS 2 ANTIGEN POCT, MANUAL READ: ICD-10-PCS | Mod: QW,S$GLB,, | Performed by: FAMILY MEDICINE

## 2023-10-11 PROCEDURE — 87811 SARS-COV-2 COVID19 W/OPTIC: CPT | Mod: QW,S$GLB,, | Performed by: FAMILY MEDICINE

## 2023-10-11 PROCEDURE — 99999 PR PBB SHADOW E&M-EST. PATIENT-LVL III: CPT | Mod: PBBFAC,,, | Performed by: FAMILY MEDICINE

## 2023-10-11 PROCEDURE — 99214 OFFICE O/P EST MOD 30 MIN: CPT | Mod: S$GLB,,, | Performed by: FAMILY MEDICINE

## 2023-10-11 PROCEDURE — 81002 POCT URINE DIPSTICK WITHOUT MICROSCOPE: ICD-10-PCS | Mod: S$GLB,,, | Performed by: FAMILY MEDICINE

## 2023-10-11 RX ORDER — NABUMETONE 500 MG/1
500 TABLET, FILM COATED ORAL 2 TIMES DAILY
Qty: 60 TABLET | Refills: 0 | Status: SHIPPED | OUTPATIENT
Start: 2023-10-11 | End: 2023-11-10

## 2023-10-11 NOTE — PROGRESS NOTES
CHIEF COMPLAINT:  10 days of myalgias    HISTORY OF PRESENT ILLNESS: The patient is a generally healthy 33 year-old.  The patient has 10 days of myalgias.  She was seen in urgent care about a week ago and had negative COVID and flu testing.    BRCA2 pos    REVIEW OF SYSTEMS:  GENERAL: No fever, chills, fatigability or weight loss.  SKIN: No rashes, itching or changes in color or texture of skin.  HEAD: No recent head trauma.  EYES: Visual acuity fine. No photophobia, ocular pain or diplopia.  EARS: Denies ear pain, discharge or vertigo.  NOSE: No loss of smell, no epistaxis or postnasal drip.  MOUTH & THROAT: No hoarseness or change in voice. No excessive gum bleeding.  NODES: Denies swollen glands.  CHEST: Denies BRANCH, cyanosis, wheezing, cough and sputum production.  CARDIOVASCULAR: Denies chest pain, PND, orthopnea or reduced exercise tolerance.  ABDOMEN: Appetite fine. No weight loss. Denies diarrhea, abdominal pain, hematemesis or blood in stool.  URINARY: No flank pain, dysuria or hematuria.  PERIPHERAL VASCULAR: No claudication or cyanosis.  MUSCULOSKELETAL: No joint stiffness or swelling. Denies back pain.  NEUROLOGIC: No history of seizures, paralysis, alteration of gait or coordination.    SOCIAL HISTORY: The patient does not smoke.  The patient consumes alcohol socially.  The patient is single.    PHYSICAL EXAMINATION:   Blood pressure 119/69, pulse 87, weight 56.8 kg (125 lb 3.5 oz), SpO2 99 %.  APPEARANCE: Well nourished, well developed, in no acute distress.    HEAD: Normocephalic, atraumatic.  EYES: PERRL. EOMI.  Conjunctivae without injection and  anicteric  Ears normal bilaterally  NOSE: Mucosa pink. Airway clear.  MOUTH & THROAT: No tonsillar enlargement. No pharyngeal erythema or exudate. No stridor.  NECK: Supple.   NODES: No cervical, axillary or inguinal lymph node enlargement.  CHEST: Lungs clear to auscultation.  No retractions are noted.  No rales or rhonchi are present.  CARDIOVASCULAR:  Normal S1, S2. No rubs, murmurs or gallops.  ABDOMEN: Bowel sounds normal. Not distended. Soft. No tenderness or masses.  No ascites is noted.  MUSCULOSKELETAL:  There is no clubbing, cyanosis, or edema of the extremities x4.  There is full range of motion of the lumbar spine.  There is full range of motion of the extremities x4.  There is no deformity noted.    NEUROLOGIC:       Normal speech development.      Hearing normal.      Normal gait.      Motor and sensory exams grossly normal.  PSYCHIATRIC: Patient is alert and oriented x3.  Thought processes are all normal.  There is no homicidality.  There is no suicidality.  There is no evidence of psychosis.    LABORATORY/RADIOLOGY:   Chart reviewed.  We will update blood work today.    ASSESSMENT:   Myalgias  URI  BRCA2 positive    PLAN:  COVID flu and urinalysis negative again today  Relafen  Observation

## 2023-10-12 ENCOUNTER — PATIENT MESSAGE (OUTPATIENT)
Dept: INTERNAL MEDICINE | Facility: CLINIC | Age: 33
End: 2023-10-12
Payer: COMMERCIAL

## 2023-10-12 DIAGNOSIS — M79.10 MYALGIA: Primary | ICD-10-CM

## 2023-11-13 ENCOUNTER — OFFICE VISIT (OUTPATIENT)
Dept: INTERNAL MEDICINE | Facility: CLINIC | Age: 33
End: 2023-11-13
Attending: FAMILY MEDICINE
Payer: COMMERCIAL

## 2023-11-13 ENCOUNTER — LAB VISIT (OUTPATIENT)
Dept: LAB | Facility: OTHER | Age: 33
End: 2023-11-13
Attending: FAMILY MEDICINE
Payer: COMMERCIAL

## 2023-11-13 VITALS
SYSTOLIC BLOOD PRESSURE: 112 MMHG | WEIGHT: 127.75 LBS | HEART RATE: 64 BPM | HEIGHT: 66 IN | BODY MASS INDEX: 20.53 KG/M2 | DIASTOLIC BLOOD PRESSURE: 66 MMHG | OXYGEN SATURATION: 97 %

## 2023-11-13 DIAGNOSIS — M79.10 MYALGIA: Primary | ICD-10-CM

## 2023-11-13 DIAGNOSIS — M79.10 MYALGIA: ICD-10-CM

## 2023-11-13 DIAGNOSIS — Z15.01 BRCA2 POSITIVE: ICD-10-CM

## 2023-11-13 DIAGNOSIS — Z15.09 BRCA2 POSITIVE: ICD-10-CM

## 2023-11-13 LAB
BASOPHILS # BLD AUTO: 0.07 K/UL (ref 0–0.2)
BASOPHILS NFR BLD: 1.2 % (ref 0–1.9)
DIFFERENTIAL METHOD: NORMAL
EOSINOPHIL # BLD AUTO: 0.2 K/UL (ref 0–0.5)
EOSINOPHIL NFR BLD: 3.9 % (ref 0–8)
ERYTHROCYTE [DISTWIDTH] IN BLOOD BY AUTOMATED COUNT: 12.5 % (ref 11.5–14.5)
ERYTHROCYTE [SEDIMENTATION RATE] IN BLOOD: 3 MM/HR (ref 0–20)
HCT VFR BLD AUTO: 39.7 % (ref 37–48.5)
HGB BLD-MCNC: 13.4 G/DL (ref 12–16)
IMM GRANULOCYTES # BLD AUTO: 0.02 K/UL (ref 0–0.04)
IMM GRANULOCYTES NFR BLD AUTO: 0.3 % (ref 0–0.5)
LYMPHOCYTES # BLD AUTO: 2.3 K/UL (ref 1–4.8)
LYMPHOCYTES NFR BLD: 39.2 % (ref 18–48)
MCH RBC QN AUTO: 30.7 PG (ref 27–31)
MCHC RBC AUTO-ENTMCNC: 33.8 G/DL (ref 32–36)
MCV RBC AUTO: 91 FL (ref 82–98)
MONOCYTES # BLD AUTO: 0.5 K/UL (ref 0.3–1)
MONOCYTES NFR BLD: 8.9 % (ref 4–15)
NEUTROPHILS # BLD AUTO: 2.7 K/UL (ref 1.8–7.7)
NEUTROPHILS NFR BLD: 46.5 % (ref 38–73)
NRBC BLD-RTO: 0 /100 WBC
PLATELET # BLD AUTO: 238 K/UL (ref 150–450)
PMV BLD AUTO: 11.2 FL (ref 9.2–12.9)
RBC # BLD AUTO: 4.36 M/UL (ref 4–5.4)
WBC # BLD AUTO: 5.84 K/UL (ref 3.9–12.7)

## 2023-11-13 PROCEDURE — 99214 PR OFFICE/OUTPT VISIT, EST, LEVL IV, 30-39 MIN: ICD-10-PCS | Mod: S$GLB,,, | Performed by: FAMILY MEDICINE

## 2023-11-13 PROCEDURE — 36415 COLL VENOUS BLD VENIPUNCTURE: CPT | Performed by: FAMILY MEDICINE

## 2023-11-13 PROCEDURE — 99214 OFFICE O/P EST MOD 30 MIN: CPT | Mod: S$GLB,,, | Performed by: FAMILY MEDICINE

## 2023-11-13 PROCEDURE — 99999 PR PBB SHADOW E&M-EST. PATIENT-LVL IV: ICD-10-PCS | Mod: PBBFAC,,, | Performed by: FAMILY MEDICINE

## 2023-11-13 PROCEDURE — 85651 RBC SED RATE NONAUTOMATED: CPT | Performed by: FAMILY MEDICINE

## 2023-11-13 PROCEDURE — 99999 PR PBB SHADOW E&M-EST. PATIENT-LVL IV: CPT | Mod: PBBFAC,,, | Performed by: FAMILY MEDICINE

## 2023-11-13 PROCEDURE — 86038 ANTINUCLEAR ANTIBODIES: CPT | Performed by: FAMILY MEDICINE

## 2023-11-13 PROCEDURE — 85025 COMPLETE CBC W/AUTO DIFF WBC: CPT | Performed by: FAMILY MEDICINE

## 2023-11-13 RX ORDER — QUETIAPINE FUMARATE 50 MG/1
50 TABLET, FILM COATED ORAL NIGHTLY
COMMUNITY
Start: 2023-11-10

## 2023-11-13 NOTE — PROGRESS NOTES
"  CHIEF COMPLAINT:  2 months of myalgias    HISTORY OF PRESENT ILLNESS: The patient is a generally healthy 33 year-old.  The patient has 2 months of myalgias.  She was seen in urgent care and the office.  Viral testing negative.    BRCA2 pos    REVIEW OF SYSTEMS:  GENERAL: No fever, chills, fatigability or weight loss.  SKIN: No rashes, itching or changes in color or texture of skin.  HEAD: No recent head trauma.  EYES: Visual acuity fine. No photophobia, ocular pain or diplopia.  EARS: Denies ear pain, discharge or vertigo.  NOSE: No loss of smell, no epistaxis or postnasal drip.  MOUTH & THROAT: No hoarseness or change in voice. No excessive gum bleeding.  NODES: Denies swollen glands.  CHEST: Denies BRANCH, cyanosis, wheezing, cough and sputum production.  CARDIOVASCULAR: Denies chest pain, PND, orthopnea or reduced exercise tolerance.  ABDOMEN: Appetite fine. No weight loss. Denies diarrhea, abdominal pain, hematemesis or blood in stool.  URINARY: No flank pain, dysuria or hematuria.  PERIPHERAL VASCULAR: No claudication or cyanosis.  MUSCULOSKELETAL:  Denies back pain.  NEUROLOGIC: No history of seizures, paralysis, alteration of gait or coordination.    SOCIAL HISTORY: The patient does not smoke.  The patient consumes alcohol socially.  The patient is single.    PHYSICAL EXAMINATION:   Blood pressure 112/66, pulse 64, height 5' 6" (1.676 m), weight 58 kg (127 lb 12.1 oz), last menstrual period 10/31/2023, SpO2 97 %.  APPEARANCE: Well nourished, well developed, in no acute distress.    HEAD: Normocephalic, atraumatic.  EYES: PERRL. EOMI.  Conjunctivae without injection and  anicteric  Ears normal bilaterally  NOSE: Mucosa pink. Airway clear.  MOUTH & THROAT: No tonsillar enlargement. No pharyngeal erythema or exudate. No stridor.  NECK: Supple.   NODES: No cervical, axillary or inguinal lymph node enlargement.  CHEST: Lungs clear to auscultation.  No retractions are noted.  No rales or rhonchi are " present.  CARDIOVASCULAR: Normal S1, S2. No rubs, murmurs or gallops.  ABDOMEN: Bowel sounds normal. Not distended. Soft. No tenderness or masses.  No ascites is noted.  MUSCULOSKELETAL:  There is no clubbing, cyanosis, or edema of the extremities x4.  There is full range of motion of the lumbar spine.  There is full range of motion of the extremities x4.  There is no deformity noted.    NEUROLOGIC:       Normal speech development.      Hearing normal.      Normal gait.      Motor and sensory exams grossly normal.  PSYCHIATRIC: Patient is alert and oriented x3.  Thought processes are all normal.  There is no homicidality.  There is no suicidality.  There is no evidence of psychosis.    LABORATORY/RADIOLOGY:   Chart reviewed.  We will update blood work today.    ASSESSMENT:   Myalgias  URI, resolved  BRCA2 positive    PLAN:  Allergy referral  Blood work    Answers submitted by the patient for this visit:  Review of Systems Questionnaire (Submitted on 11/13/2023)  activity change: No  unexpected weight change: No  neck pain: Yes  hearing loss: No  rhinorrhea: No  trouble swallowing: No  eye discharge: No  visual disturbance: No  chest tightness: No  wheezing: No  chest pain: No  palpitations: No  blood in stool: No  constipation: No  vomiting: No  diarrhea: No  polydipsia: No  polyuria: No  difficulty urinating: No  hematuria: No  menstrual problem: No  dysuria: No  joint swelling: Yes  arthralgias: Yes  headaches: Yes  weakness: Yes  confusion: Yes  dysphoric mood: Yes

## 2023-11-14 LAB — ANA SER QL IF: NORMAL

## 2023-11-22 ENCOUNTER — PATIENT MESSAGE (OUTPATIENT)
Dept: GYNECOLOGIC ONCOLOGY | Facility: CLINIC | Age: 33
End: 2023-11-22
Payer: COMMERCIAL

## 2024-01-11 ENCOUNTER — HOSPITAL ENCOUNTER (OUTPATIENT)
Dept: RADIOLOGY | Facility: HOSPITAL | Age: 34
Discharge: HOME | End: 2024-01-11
Attending: INTERNAL MEDICINE
Payer: COMMERCIAL

## 2024-01-11 ENCOUNTER — OFFICE VISIT (OUTPATIENT)
Dept: RHEUMATOLOGY | Facility: CLINIC | Age: 34
End: 2024-01-11
Payer: COMMERCIAL

## 2024-01-11 VITALS
WEIGHT: 131.38 LBS | SYSTOLIC BLOOD PRESSURE: 113 MMHG | DIASTOLIC BLOOD PRESSURE: 74 MMHG | HEIGHT: 66 IN | HEART RATE: 83 BPM | BODY MASS INDEX: 21.11 KG/M2

## 2024-01-11 DIAGNOSIS — M79.10 MYALGIA: ICD-10-CM

## 2024-01-11 DIAGNOSIS — M25.50 POLYARTHRALGIA: ICD-10-CM

## 2024-01-11 DIAGNOSIS — M25.50 POLYARTHRALGIA: Primary | ICD-10-CM

## 2024-01-11 PROCEDURE — 99999 PR PBB SHADOW E&M-EST. PATIENT-LVL IV: CPT | Mod: PBBFAC,,, | Performed by: INTERNAL MEDICINE

## 2024-01-11 PROCEDURE — 99204 OFFICE O/P NEW MOD 45 MIN: CPT | Mod: S$GLB,,, | Performed by: INTERNAL MEDICINE

## 2024-01-11 PROCEDURE — 77077 JOINT SURVEY SINGLE VIEW: CPT | Mod: TC

## 2024-01-11 PROCEDURE — 77077 JOINT SURVEY SINGLE VIEW: CPT | Mod: 26,,, | Performed by: RADIOLOGY

## 2024-01-11 NOTE — PROGRESS NOTES
Subjective:      Patient ID: Haley Henriquez is a 33 y.o. female.    Chief Complaint: Disease Management    HPI  33 year old F with PMH of depression,  STEPHANIE, asthma, BRCA 2, chronic headache, right knee meniscus tear here for evaluation.  She noticed Raynauds when she was her 20s.  Reports it has worsened in last couple of years.  She started joint pain in September. SHe has pain in elbows, wrists, ankles, knees, and thighs. Some days, it can be 8/10. She tried nabumetone 500mg twice a day and it helped.  Pain used to be everyday but now not everyday.  Denies joint swelling. Denies any rashes. , oral ulcers. Reports hair loss for last 2 years.   Denies telangiectasias or photosensitivity. Denies pleurisy. Denies oral ulcers.  Denies reflux. She feels bloated after eating.    Denies dry eyes or dry mouth.    Past Medical History:   Diagnosis Date    Allergy     Anxiety     Asthma     BRCA2 positive     Depression     Headache     Hx of psychiatric care     Panic disorder     Psychiatric problem     Therapy        Review of Systems see HPI      Objective:   33 year old F with PMH of depression,  STEPHANIE, asthma, BRCA 2, chronic headache, right knee meniscus tear here for evaluation.  Physical Exam   Constitutional: She is oriented to person, place, and time.   HENT:   Head: Normocephalic and atraumatic.   Right Ear: External ear normal.   Left Ear: External ear normal.   Nose: Nose normal.   Mouth/Throat: Oropharynx is clear and moist. No oropharyngeal exudate.   Eyes: Pupils are equal, round, and reactive to light. Conjunctivae are normal. Right eye exhibits no discharge. Left eye exhibits no discharge. No scleral icterus.   Neck: No JVD present. No thyromegaly present.   Cardiovascular: Normal rate, regular rhythm and normal heart sounds. Exam reveals no gallop and no friction rub.   No murmur heard.  Pulmonary/Chest: Effort normal and breath sounds normal. No respiratory distress. She has no wheezes. She has no  rales. She exhibits no tenderness.   Abdominal: Soft. Bowel sounds are normal. She exhibits no distension and no mass. There is no abdominal tenderness. There is no rebound and no guarding.   Musculoskeletal:         General: No tenderness.      Cervical back: Neck supple.   Lymphadenopathy:     She has no cervical adenopathy.   Neurological: She is alert and oriented to person, place, and time. No cranial nerve deficit. Gait normal. Coordination normal.   Skin: Skin is dry. No rash noted. No erythema. No pallor.   Psychiatric: Affect and judgment normal.         No data to display     Assessment:   33 year old F with PMH of depression,  STEPHANIE, asthma, BRCA 2, chronic headache, right knee meniscus tear here for evaluation of joint pain and Raynauds.  She has diffuse arthralgias. Will do basic work up and consider MRIs if needed.  With regards to Raynauds, low suspicion for connective tissue disease but will finish the work up.  1. Myalgia          Plan:     Problem List Items Addressed This Visit    None  Visit Diagnoses       Myalgia            Labs  xrays  Consider MRI of hands and right ankle    45  * minutes of total time spent on the encounter, which includes face to face time and non-face to face time preparing to see the patient (eg, review of tests), Obtaining and/or reviewing separately obtained history, Documenting clinical information in the electronic or other health record, Independently interpreting results (not separately reported) and communicating results to the patient/family/caregiver, or Care coordination (not separately reported).

## 2024-01-12 ENCOUNTER — HOSPITAL ENCOUNTER (OUTPATIENT)
Dept: RADIOLOGY | Facility: OTHER | Age: 34
Discharge: HOME | End: 2024-01-12
Attending: OBSTETRICS & GYNECOLOGY
Payer: COMMERCIAL

## 2024-01-12 DIAGNOSIS — Z15.01 BRCA2 POSITIVE: ICD-10-CM

## 2024-01-12 DIAGNOSIS — Z15.09 BRCA2 POSITIVE: ICD-10-CM

## 2024-01-12 PROCEDURE — 76830 TRANSVAGINAL US NON-OB: CPT | Mod: 26,,, | Performed by: RADIOLOGY

## 2024-01-12 PROCEDURE — 25500020 PHARM REV CODE 255: Performed by: OBSTETRICS & GYNECOLOGY

## 2024-01-12 PROCEDURE — 76830 TRANSVAGINAL US NON-OB: CPT | Mod: TC

## 2024-01-12 PROCEDURE — 77049 MRI BREAST C-+ W/CAD BI: CPT | Mod: 26,,, | Performed by: RADIOLOGY

## 2024-01-12 PROCEDURE — 77049 MRI BREAST C-+ W/CAD BI: CPT | Mod: TC

## 2024-01-12 PROCEDURE — A9577 INJ MULTIHANCE: HCPCS | Performed by: OBSTETRICS & GYNECOLOGY

## 2024-01-12 RX ADMIN — GADOBENATE DIMEGLUMINE 11 ML: 529 INJECTION, SOLUTION INTRAVENOUS at 06:01

## 2024-01-16 DIAGNOSIS — M79.641 BILATERAL HAND PAIN: Primary | ICD-10-CM

## 2024-01-16 DIAGNOSIS — G89.29 CHRONIC PAIN OF RIGHT ANKLE: ICD-10-CM

## 2024-01-16 DIAGNOSIS — M25.571 PAIN IN JOINT INVOLVING RIGHT ANKLE AND FOOT: ICD-10-CM

## 2024-01-16 DIAGNOSIS — M25.571 CHRONIC PAIN OF RIGHT ANKLE: ICD-10-CM

## 2024-01-16 DIAGNOSIS — M79.89 BILATERAL HAND SWELLING: ICD-10-CM

## 2024-01-16 DIAGNOSIS — M25.471 RIGHT ANKLE SWELLING: ICD-10-CM

## 2024-01-16 DIAGNOSIS — M79.642 BILATERAL HAND PAIN: Primary | ICD-10-CM

## 2024-04-02 ENCOUNTER — OFFICE VISIT (OUTPATIENT)
Dept: OBSTETRICS AND GYNECOLOGY | Facility: CLINIC | Age: 34
End: 2024-04-02
Payer: COMMERCIAL

## 2024-04-02 VITALS
DIASTOLIC BLOOD PRESSURE: 60 MMHG | HEIGHT: 66 IN | SYSTOLIC BLOOD PRESSURE: 102 MMHG | WEIGHT: 129 LBS | BODY MASS INDEX: 20.73 KG/M2

## 2024-04-02 DIAGNOSIS — Z30.432 ENCOUNTER FOR IUD REMOVAL: ICD-10-CM

## 2024-04-02 DIAGNOSIS — R10.2 PELVIC PAIN: Primary | ICD-10-CM

## 2024-04-02 LAB
B-HCG UR QL: NEGATIVE
CTP QC/QA: YES

## 2024-04-02 PROCEDURE — 58301 REMOVE INTRAUTERINE DEVICE: CPT | Mod: S$GLB,,, | Performed by: NURSE PRACTITIONER

## 2024-04-02 PROCEDURE — 81025 URINE PREGNANCY TEST: CPT | Mod: S$GLB,,, | Performed by: NURSE PRACTITIONER

## 2024-04-02 PROCEDURE — 87491 CHLMYD TRACH DNA AMP PROBE: CPT | Performed by: NURSE PRACTITIONER

## 2024-04-02 PROCEDURE — 87086 URINE CULTURE/COLONY COUNT: CPT | Performed by: NURSE PRACTITIONER

## 2024-04-02 PROCEDURE — 99213 OFFICE O/P EST LOW 20 MIN: CPT | Mod: 25,S$GLB,, | Performed by: NURSE PRACTITIONER

## 2024-04-02 PROCEDURE — 99999 PR PBB SHADOW E&M-EST. PATIENT-LVL III: CPT | Mod: PBBFAC,,, | Performed by: NURSE PRACTITIONER

## 2024-04-02 PROCEDURE — 87591 N.GONORRHOEAE DNA AMP PROB: CPT | Performed by: NURSE PRACTITIONER

## 2024-04-02 RX ORDER — KETOROLAC TROMETHAMINE 10 MG/1
10 TABLET, FILM COATED ORAL EVERY 6 HOURS
Qty: 20 TABLET | Refills: 0 | Status: SHIPPED | OUTPATIENT
Start: 2024-04-02 | End: 2024-04-07

## 2024-04-02 NOTE — PROGRESS NOTES
PROCEDURE:     PRE-IUD REMOVAL COUNSELING:  The patient was advised of minimal risks of bleeding and pain and she agrees to proceed.    PROCEDURE:  TIME OUT PERFORMED.  IUD strings were visualized at the os and grasped. IUD removed with gentle traction.  The patient tolerated the procedure fairly well      POST IUD REMOVAL COUNSELING:  Expect period-like flow to occur after Mirena IUD removal and periods to return to pre-IUD pattern.  Manage post IUD removal cramping with NSAIDs, Tylenol or Rx per [employer].    POST IUD REMOVAL CONTRACEPTION:[Post IUD removal contraception- condoms for present and will decide]    Counseling lasted approximately 15 minutes and all her questions were answered.    FOLLOW-UP: With me for annual gyn exam or prn.

## 2024-04-02 NOTE — PROGRESS NOTES
CC: pelvic pain     HPI: Pt is a 34 y.o.  female who presents c/o intermittent pelvic pain. Reports pain is more intense on the RLQ. This has been ongoing for the past 4 weeks. Reports the pain will last from 30 min to several hours. She has used Ibuprofen to help with the pain- which has only provided minimal relief. She denies any fever. No UTI symptoms. She reports h/o constipation- using pre and probiotics to manage.   She has Paragard IUD in place. Pelvic US in 1/24 done for surveillance due to BRCA positive  and revealed IUD is in the mid to lower portion of the uterus. She reports had intese lower abdominal presure  with cycle day 1 and 2. Cycles with the Paragard are extremely heavy and pain- needing to miss work at times.     ROS:  GENERAL: Feeling well overall. Denies fever or chills.   SKIN: Denies rash or lesions.   HEAD: Denies head injury or headache.   NODES: Denies enlarged lymph nodes.   CHEST: Denies chest pain or shortness of breath.   CARDIOVASCULAR: Denies palpitations or left sided chest pain.   ABDOMEN: No abdominal pain, constipation, diarrhea, nausea, vomiting or rectal bleeding.   URINARY: No dysuria, hematuria, or burning on urination.  REPRODUCTIVE: See HPI.   BREASTS: Denies pain, lumps, or nipple discharge.   HEMATOLOGIC: No easy bruisability or excessive bleeding.   MUSCULOSKELETAL: Denies joint pain or swelling.   NEUROLOGIC: Denies syncope or weakness.   PSYCHIATRIC: Denies depression, anxiety or mood swings.    PE:   APPEARANCE: Well nourished, well developed, White female in no acute distress.  VULVA: No lesions. Normal external female genitalia.  URETHRAL MEATUS: Normal size and location, no lesions, no prolapse.  URETHRA: No masses, tenderness, or prolapse.  VAGINA: Moist. No lesions or lacerations noted. No abnormal discharge present. No odor present.   CERVIX: No lesions or discharge. No cervical motion tenderness. IUD removed with ring forceps- pt with discomfort during and  post removal.   UTERUS: Normal size, regular shape, mobile, non-tender.  ADNEXA: + RLQ tenderness. +  fullness palpated in right  adnexal region. Left side benign   ANUS PERINEUM: Normal.      Diagnosis:  1. Pelvic pain    2. Encounter for IUD removal        Plan:   UPT is negative  Urine cx  Toradol PRN sparingly for pain. Discussed to avoid with all other NSAIDS for 24 hours and to take with food to avoid GI upset   Pelvic US   Discussed IUD replacement - reviewed progesterone vs non - hormonal pamphlets given. Pt to research and decide   IUD removed see note   Orders Placed This Encounter    Urine culture    US Pelvis Comp with Transvag NON-OB (xpd    POCT Urine Pregnancy    ketorolac (TORADOL) 10 mg tablet         Follow-up PRN no resolution of symptoms.      Paz Plunkett, HUBER-C

## 2024-04-03 ENCOUNTER — TELEPHONE (OUTPATIENT)
Dept: OBSTETRICS AND GYNECOLOGY | Facility: CLINIC | Age: 34
End: 2024-04-03
Payer: COMMERCIAL

## 2024-04-03 ENCOUNTER — HOSPITAL ENCOUNTER (OUTPATIENT)
Dept: RADIOLOGY | Facility: OTHER | Age: 34
Discharge: HOME | End: 2024-04-03
Attending: NURSE PRACTITIONER
Payer: COMMERCIAL

## 2024-04-03 DIAGNOSIS — R10.2 PELVIC PAIN: ICD-10-CM

## 2024-04-03 DIAGNOSIS — R10.31 PAIN, ABDOMINAL, RLQ: Primary | ICD-10-CM

## 2024-04-03 PROCEDURE — 76830 TRANSVAGINAL US NON-OB: CPT | Mod: 26,,, | Performed by: RADIOLOGY

## 2024-04-03 PROCEDURE — 76830 TRANSVAGINAL US NON-OB: CPT | Mod: TC

## 2024-04-03 PROCEDURE — 76856 US EXAM PELVIC COMPLETE: CPT | Mod: 26,,, | Performed by: RADIOLOGY

## 2024-04-04 ENCOUNTER — HOSPITAL ENCOUNTER (OUTPATIENT)
Dept: RADIOLOGY | Facility: OTHER | Age: 34
Discharge: HOME | End: 2024-04-04
Attending: NURSE PRACTITIONER
Payer: COMMERCIAL

## 2024-04-04 DIAGNOSIS — R10.31 PAIN, ABDOMINAL, RLQ: ICD-10-CM

## 2024-04-04 LAB
BACTERIA UR CULT: NORMAL
C TRACH DNA SPEC QL NAA+PROBE: NOT DETECTED
N GONORRHOEA DNA SPEC QL NAA+PROBE: NOT DETECTED

## 2024-04-04 PROCEDURE — 76705 ECHO EXAM OF ABDOMEN: CPT | Mod: 26,,, | Performed by: RADIOLOGY

## 2024-04-04 PROCEDURE — 76705 ECHO EXAM OF ABDOMEN: CPT | Mod: TC

## 2024-04-05 ENCOUNTER — TELEPHONE (OUTPATIENT)
Dept: OBSTETRICS AND GYNECOLOGY | Facility: CLINIC | Age: 34
End: 2024-04-05
Payer: COMMERCIAL

## 2024-04-05 ENCOUNTER — PATIENT MESSAGE (OUTPATIENT)
Dept: OBSTETRICS AND GYNECOLOGY | Facility: CLINIC | Age: 34
End: 2024-04-05
Payer: COMMERCIAL

## 2024-04-05 DIAGNOSIS — R10.31 PAIN, ABDOMINAL, RLQ: Primary | ICD-10-CM

## 2024-04-07 ENCOUNTER — PATIENT MESSAGE (OUTPATIENT)
Dept: OBSTETRICS AND GYNECOLOGY | Facility: CLINIC | Age: 34
End: 2024-04-07
Payer: COMMERCIAL

## 2024-04-08 ENCOUNTER — TELEPHONE (OUTPATIENT)
Dept: OBSTETRICS AND GYNECOLOGY | Facility: CLINIC | Age: 34
End: 2024-04-08
Payer: COMMERCIAL

## 2024-04-08 DIAGNOSIS — Z30.9 ENCOUNTER FOR CONTRACEPTIVE MANAGEMENT, UNSPECIFIED TYPE: Primary | ICD-10-CM

## 2024-04-14 ENCOUNTER — PATIENT MESSAGE (OUTPATIENT)
Dept: OBSTETRICS AND GYNECOLOGY | Facility: CLINIC | Age: 34
End: 2024-04-14
Payer: COMMERCIAL

## 2024-05-09 ENCOUNTER — OFFICE VISIT (OUTPATIENT)
Dept: OBSTETRICS AND GYNECOLOGY | Facility: CLINIC | Age: 34
End: 2024-05-09
Payer: COMMERCIAL

## 2024-05-09 VITALS — SYSTOLIC BLOOD PRESSURE: 104 MMHG | DIASTOLIC BLOOD PRESSURE: 76 MMHG | WEIGHT: 121.5 LBS | BODY MASS INDEX: 19.61 KG/M2

## 2024-05-09 DIAGNOSIS — Z15.01 BRCA2 POSITIVE: ICD-10-CM

## 2024-05-09 DIAGNOSIS — R10.2 PELVIC PAIN: ICD-10-CM

## 2024-05-09 DIAGNOSIS — Z15.09 BRCA2 POSITIVE: ICD-10-CM

## 2024-05-09 DIAGNOSIS — N94.10 DYSPAREUNIA, FEMALE: Primary | ICD-10-CM

## 2024-05-09 PROCEDURE — 99999 PR PBB SHADOW E&M-EST. PATIENT-LVL III: CPT | Mod: PBBFAC,,, | Performed by: OBSTETRICS & GYNECOLOGY

## 2024-05-09 PROCEDURE — 99214 OFFICE O/P EST MOD 30 MIN: CPT | Mod: S$GLB,,, | Performed by: OBSTETRICS & GYNECOLOGY

## 2024-05-09 RX ORDER — MELOXICAM 15 MG/1
15 TABLET ORAL DAILY PRN
Qty: 30 TABLET | Refills: 6 | Status: SHIPPED | OUTPATIENT
Start: 2024-05-09

## 2024-05-09 RX ORDER — VILAZODONE HYDROCHLORIDE 20 MG/1
TABLET ORAL
COMMUNITY
Start: 2024-05-06 | End: 2024-05-27

## 2024-05-09 NOTE — PROGRESS NOTES
Subjective     Patient ID: Haley Henriquez is a 34 y.o. female.    Chief Complaint:  PELVIC PRESSURE and Pelvic Pain (Pt complains of pelvic pressure and pain on the right pelvic area. Pt states the pain has progressed more medial in the pelvic. 3/10 pain. Pt also complains of pain during intercourse. )      History of Present Illness  HPI  Pt is 34 y.o. with Patient's last menstrual period was 2024 (approximate). Here to discuss her pelvic pain.  Pt states that her pain over the last few months has been more sharp on the right.  Not consistent.  Is assoicated with her menses.  Has been sharp to where she went to the ED.  But no sharp pain since her IUD was removed.    She also has some pain with urination.  + pain with sex.  Did use ocp's from age [age] but would like to avoid hormones.  Not good about remembering pills.    States that she had an u/s performed recently b/c of her hx of BRCA 2 gene mutation.  No cysts seen.  Had no issues in march but has had painful intercourse since.    Ross have mild pain with BM and urination.    Pain is mainly midline.      GYN & OB History  Patient's last menstrual period was 2024 (approximate).   Date of Last Pap: 2022    OB History    Para Term  AB Living   1 0 0 0 1 0   SAB IAB Ectopic Multiple Live Births   0 1 0 0        # Outcome Date GA Lbr Boaz/2nd Weight Sex Type Anes PTL Lv   1 IAB                Review of Systems  Review of Systems   Constitutional:  Negative for activity change, appetite change and fatigue.   HENT: Negative.  Negative for tinnitus.    Eyes: Negative.    Respiratory:  Negative for cough and shortness of breath.    Cardiovascular:  Negative for chest pain and palpitations.   Gastrointestinal: Negative.  Negative for abdominal pain, blood in stool, constipation, diarrhea and nausea.   Endocrine: Negative.  Negative for hot flashes.   Genitourinary:  Positive for dyspareunia, dysuria and pelvic pain. Negative for  dysmenorrhea, frequency, menstrual problem, vaginal discharge, urinary incontinence and postcoital bleeding.   Musculoskeletal:  Negative for back pain and joint swelling.   Integumentary:  Negative for rash, breast mass, nipple discharge and breast skin changes.   Neurological: Negative.  Negative for headaches.   Hematological: Negative.  Does not bruise/bleed easily.   Psychiatric/Behavioral:  The patient is not nervous/anxious.    Breast: negative.  Negative for mass, nipple discharge and skin changes         Objective   Physical Exam:   Constitutional: She is oriented to person, place, and time. She appears well-developed and well-nourished. No distress.    HENT:   Head: Normocephalic and atraumatic.    Eyes: Pupils are equal, round, and reactive to light. Conjunctivae and lids are normal.     Cardiovascular:  Normal rate and regular rhythm.      Exam reveals no edema.        Pulmonary/Chest: Effort normal.        Abdominal: Soft. Bowel sounds are normal. She exhibits no distension. There is no abdominal tenderness. There is no rebound and no guarding.     Genitourinary:    Vagina, uterus, right adnexa and left adnexa normal.      Pelvic exam was performed with patient supine.   The external female genitalia was normal.     Labial bartholins normal.There is no tenderness or lesion on the right labia. There is no tenderness or lesion on the left labia. Cervix is normal. Right adnexum displays no mass and no tenderness. Left adnexum displays no mass and no tenderness. No vaginal discharge, rectocele, cystocele or prolapse of vaginal walls in the vagina. Cervix exhibits no motion tenderness and no discharge. Uterus is not deviated, not fixed and not hosting fibroids. Normal urethral meatus.Urethra findings: no tendernessBladder findings: no bladder tenderness   Genitourinary Comments: A female chaperone was present for the entire exam  No significant pelvic floor spasm               Musculoskeletal: Normal range  of motion and moves all extremeties.       Neurological: She is alert and oriented to person, place, and time. She has normal strength.    Skin: Skin is warm and dry.    Psychiatric: She has a normal mood and affect. Her speech is normal and behavior is normal. Thought content normal. Her mood appears not anxious. She does not exhibit a depressed mood. She expresses no suicidal plans and no homicidal plans.            Assessment and Plan     1. Dyspareunia, female    2. Pelvic pain    3. BRCA2 positive             Plan:  Haley was seen today for pelvic pressure and pelvic pain.    Diagnoses and all orders for this visit:    Dyspareunia, female  -     meloxicam (MOBIC) 15 MG tablet; Take 1 tablet (15 mg total) by mouth daily as needed for Pain.  We had a long discussion about how endo is diagnosed (surgically) and the different tx options.  Info about orlissa given.  Also given mobic to help with pain.  Not ready to proceed with surgery at this time.    Pelvic pain  -     meloxicam (MOBIC) 15 MG tablet; Take 1 tablet (15 mg total) by mouth daily as needed for Pain.    BRCA2 positive  -     Ambulatory referral/consult to Breast Surgery; Future

## 2024-05-15 ENCOUNTER — TELEPHONE (OUTPATIENT)
Dept: OBSTETRICS AND GYNECOLOGY | Facility: CLINIC | Age: 34
End: 2024-05-15
Payer: COMMERCIAL

## 2024-05-17 ENCOUNTER — PATIENT MESSAGE (OUTPATIENT)
Dept: OBSTETRICS AND GYNECOLOGY | Facility: CLINIC | Age: 34
End: 2024-05-17
Payer: COMMERCIAL

## 2024-05-27 ENCOUNTER — PROCEDURE VISIT (OUTPATIENT)
Dept: OBSTETRICS AND GYNECOLOGY | Facility: CLINIC | Age: 34
End: 2024-05-27
Payer: COMMERCIAL

## 2024-05-27 VITALS
WEIGHT: 125.88 LBS | DIASTOLIC BLOOD PRESSURE: 66 MMHG | SYSTOLIC BLOOD PRESSURE: 98 MMHG | BODY MASS INDEX: 20.23 KG/M2 | HEIGHT: 66 IN

## 2024-05-27 DIAGNOSIS — Z30.430 ENCOUNTER FOR IUD INSERTION: Primary | ICD-10-CM

## 2024-05-27 DIAGNOSIS — Z80.3 FAMILY HISTORY OF BREAST CANCER IN FIRST DEGREE RELATIVE: ICD-10-CM

## 2024-05-27 LAB
B-HCG UR QL: NEGATIVE
CTP QC/QA: YES

## 2024-05-27 PROCEDURE — 58300 INSERT INTRAUTERINE DEVICE: CPT | Mod: S$GLB,,, | Performed by: NURSE PRACTITIONER

## 2024-05-27 PROCEDURE — 81025 URINE PREGNANCY TEST: CPT | Mod: S$GLB,,, | Performed by: NURSE PRACTITIONER

## 2024-05-27 PROCEDURE — 99499 UNLISTED E&M SERVICE: CPT | Mod: 25,S$GLB,, | Performed by: NURSE PRACTITIONER

## 2024-05-27 NOTE — PROCEDURES
Haley Henriquez is a 34 y.o. female  presents for IUD placement.  Patient's last menstrual period was 2024 (approximate)..  She desires Lot # [id number], Expiration date 2026.  UPT is negative.      She was counseled on the risks, benefits, indications, and alternatives to IUD use.  She understands that with insertion there is a risk of bleeding, infection, and uterine perforation.  All questions are answered.  Consents signed.  Cervical cultures were not performed.    Procedure:  Time out performed.  The cervix was visualized with a speculum.  A single tooth tenaculum was placed on the anterior lip of the cervix.  The uterus sounds to 9cm using sterile technique.  A Kyleena  was loaded and placed high in the uterine fundus without difficulty using sterile technique.  The strings were then trimmed.  The tenaculum and speculum were removed.  The patient tolerated the procedure well.    Assessment:  1.  Contraceptive management/IUD insertion    Post IUD placement counseling:  Manage post IUD placement pain with NSAIDS, Tylenol or Rx per Medcard.  IUD danger signs and how to check for strings were discussed.  The IUD needs to be removed in 5 years for Mirena and 10 years for Copper IUD.    Counseling lasted approximately 15 minutes and all her questions were answered.    Follow up:  2 weeks.

## 2024-05-28 ENCOUNTER — TELEPHONE (OUTPATIENT)
Dept: SURGERY | Facility: CLINIC | Age: 34
End: 2024-05-28
Payer: COMMERCIAL

## 2024-05-28 NOTE — TELEPHONE ENCOUNTER
----- Message from Muna Javier MA sent at 5/27/2024  3:01 PM CDT -----  Patient has an appointment scheduled with high risk doctor. She states she would rather see oncology than have this appointment. Are you able to schedule that for her?

## 2024-06-02 ENCOUNTER — PATIENT MESSAGE (OUTPATIENT)
Dept: OBSTETRICS AND GYNECOLOGY | Facility: CLINIC | Age: 34
End: 2024-06-02
Payer: COMMERCIAL

## 2024-06-25 ENCOUNTER — OFFICE VISIT (OUTPATIENT)
Dept: OBSTETRICS AND GYNECOLOGY | Facility: CLINIC | Age: 34
End: 2024-06-25
Payer: COMMERCIAL

## 2024-06-25 DIAGNOSIS — N94.10 DYSPAREUNIA, FEMALE: ICD-10-CM

## 2024-06-25 DIAGNOSIS — R10.2 PELVIC PAIN: ICD-10-CM

## 2024-06-25 PROBLEM — Z91.89 AT HIGH RISK FOR BREAST CANCER: Status: RESOLVED | Noted: 2020-08-27 | Resolved: 2024-06-25

## 2024-06-25 PROBLEM — J02.9 ACUTE PHARYNGITIS: Status: RESOLVED | Noted: 2019-05-06 | Resolved: 2024-06-25

## 2024-06-25 PROBLEM — M23.200 OLD TEAR OF LATERAL MENISCUS OF RIGHT KNEE: Status: RESOLVED | Noted: 2017-12-08 | Resolved: 2024-06-25

## 2024-06-25 PROCEDURE — 99213 OFFICE O/P EST LOW 20 MIN: CPT | Mod: 95,,, | Performed by: OBSTETRICS & GYNECOLOGY

## 2024-06-25 RX ORDER — FLUVOXAMINE MALEATE 100 MG/1
100 TABLET, COATED ORAL
COMMUNITY
Start: 2024-05-28

## 2024-06-25 RX ORDER — SERTRALINE HYDROCHLORIDE 50 MG/1
50 TABLET, FILM COATED ORAL NIGHTLY
COMMUNITY

## 2024-06-25 RX ORDER — MELOXICAM 15 MG/1
15 TABLET ORAL DAILY PRN
Qty: 30 TABLET | Refills: 6 | Status: SHIPPED | OUTPATIENT
Start: 2024-06-25

## 2024-06-25 NOTE — PROGRESS NOTES
The patient location is: home  The chief complaint leading to consultation is: pain, IUD    Visit type: audiovisual    Face to Face time with patient: 20 minutes of total time spent on the encounter, which includes face to face time and non-face to face time preparing to see the patient (eg, review of tests), Obtaining and/or reviewing separately obtained history, Documenting clinical information in the electronic or other health record, Independently interpreting results (not separately reported) and communicating results to the patient/family/caregiver, or Care coordination (not separately reported).         Each patient to whom he or she provides medical services by telemedicine is:  (1) informed of the relationship between the physician and patient and the respective role of any other health care provider with respect to management of the patient; and (2) notified that he or she may decline to receive medical services by telemedicine and may withdraw from such care at any time.    Notes:   Pt is 34 y.o. who recently had Kyleena IUD placed (5/2024) and copper IUD removed.  Of note, pt was historically on OCP's x 20 years and is trying to get off hormones.  She is having some mild spotting after the placement of the new IUD.  Has had intercourse a couple of times and it didn't feel great.    We discussed the possibility of endometriosis and aware that it is a surgically dx disease.  I would recommend we allow the IUD to work for 3 months.  If pain is persistent, it would not be unreasonable to consider a diagnostic laparoscopy.  All questions answered.  She will continue to keep a pain diary.

## 2025-01-09 ENCOUNTER — OFFICE VISIT (OUTPATIENT)
Dept: INTERNAL MEDICINE | Facility: CLINIC | Age: 35
End: 2025-01-09
Attending: FAMILY MEDICINE
Payer: COMMERCIAL

## 2025-01-09 VITALS
HEIGHT: 66 IN | HEART RATE: 73 BPM | WEIGHT: 127 LBS | OXYGEN SATURATION: 99 % | DIASTOLIC BLOOD PRESSURE: 62 MMHG | BODY MASS INDEX: 20.41 KG/M2 | SYSTOLIC BLOOD PRESSURE: 110 MMHG

## 2025-01-09 DIAGNOSIS — Z15.09 BRCA2 POSITIVE: ICD-10-CM

## 2025-01-09 DIAGNOSIS — Z15.01 BRCA2 POSITIVE: ICD-10-CM

## 2025-01-09 DIAGNOSIS — R42 VERTIGO: Primary | ICD-10-CM

## 2025-01-09 PROCEDURE — G2211 COMPLEX E/M VISIT ADD ON: HCPCS | Mod: S$GLB,,, | Performed by: FAMILY MEDICINE

## 2025-01-09 PROCEDURE — 99215 OFFICE O/P EST HI 40 MIN: CPT | Mod: S$GLB,,, | Performed by: FAMILY MEDICINE

## 2025-01-09 PROCEDURE — 99999 PR PBB SHADOW E&M-EST. PATIENT-LVL V: CPT | Mod: PBBFAC,,, | Performed by: FAMILY MEDICINE

## 2025-01-09 RX ORDER — MECLIZINE HYDROCHLORIDE 25 MG/1
25 TABLET ORAL 3 TIMES DAILY PRN
Qty: 30 TABLET | Refills: 1 | Status: SHIPPED | OUTPATIENT
Start: 2025-01-09

## 2025-01-09 RX ORDER — PROMETHAZINE HYDROCHLORIDE 25 MG/1
25 TABLET ORAL EVERY 4 HOURS PRN
Qty: 30 TABLET | Refills: 1 | Status: SHIPPED | OUTPATIENT
Start: 2025-01-09

## 2025-01-09 RX ORDER — ONDANSETRON HYDROCHLORIDE 8 MG/1
8 TABLET, FILM COATED ORAL EVERY 12 HOURS PRN
Qty: 20 TABLET | Refills: 1 | Status: SHIPPED | OUTPATIENT
Start: 2025-01-09

## 2025-01-09 RX ORDER — SERTRALINE HYDROCHLORIDE 100 MG/1
100 TABLET, FILM COATED ORAL
COMMUNITY
Start: 2024-11-04

## 2025-01-09 NOTE — PROGRESS NOTES
"CHIEF COMPLAINT: The patient complains of BPV about 24 hours    HISTORY OF PRESENT ILLNESS: The patient complains of BPV about 24 hours.  The patient has a sensation of the room spinning.  Lots of nausea as well.  The patient denies any syncopal or presyncopal symptoms.  Patient denies any trauma.  The patient denies any focal weakness.  Patient denies any generalized weakness.  The patient denies any headache.    REVIEW OF SYSTEMS:  GENERAL: No fever, chills, fatigability or weight loss.  SKIN: No rashes, itching or changes in color or texture of skin.  HEAD: No headaches or recent head trauma.  EYES: Visual acuity fine. No photophobia, ocular pain or diplopia.  EARS: Denies ear pain, discharge.  NOSE: No loss of smell, no epistaxis or postnasal drip.  MOUTH & THROAT: No hoarseness or change in voice. No excessive gum bleeding.  NODES: Denies swollen glands.  CHEST: Denies BRANCH, cyanosis, wheezing, cough and sputum production.  CARDIOVASCULAR: Denies chest pain, PND, orthopnea or reduced exercise tolerance.  ABDOMEN:  No weight loss. Denies diarrhea, abdominal pain, hematemesis or blood in stool.  URINARY: No flank pain, dysuria or hematuria.  PERIPHERAL VASCULAR: No claudication or cyanosis.  MUSCULOSKELETAL: No joint stiffness or swelling. Denies back pain.  NEUROLOGIC: No history of seizures, paralysis.    SOCIAL HISTORY: Unchanged since recent note by PCP.    PHYSICAL EXAMINATION:   Blood pressure 110/62, pulse 73, height 5' 6" (1.676 m), weight 57.6 kg (127 lb), last menstrual period 12/23/2024, SpO2 99%.\  APPEARANCE: Well nourished, well developed.  She is very uncomfortable.  She is unable to move her head in any direction without exacerbating the vertigo.  HEAD: Normocephalic, atraumatic.  EYES: PERRL. EOMI.  Conjunctivae without injection and  anicteric  NOSE: Mucosa pink. Airway clear.  MOUTH & THROAT: No tonsillar enlargement. No pharyngeal erythema or exudate. No stridor.  NECK: Supple.   NODES: No " cervical, axillary or inguinal lymph node enlargement.  CHEST: Lungs clear to auscultation.  No retractions are noted.  No rales or rhonchi are present.  CARDIOVASCULAR: Normal S1, S2. No rubs, murmurs or gallops.  ABDOMEN: Bowel sounds normal. Not distended. Soft. No tenderness or masses.  No ascites is noted.  MUSCULOSKELETAL:  There is no clubbing, cyanosis, or edema of the extremities x4.  There is full range of motion of the lumbar spine.  There is full range of motion of the extremities x4.  There is no deformity noted.    NEUROLOGIC:       Normal speech development.      Hearing normal.      Very unsteady gait.      Motor and sensory exams grossly normal.  PSYCHIATRIC: Patient is alert and oriented x3.  Thought processes are all normal.  There is no homicidality.  There is no suicidality.  There is no evidence of psychosis.    LABORATORY/RADIOLOGY: Chart reviewed.    ASSESSMENT:   Benign positional vertigo, severe    PLAN:  Antivert Zofran and Phenergan as per med card.  Patient was taken to her ride by wheelchair  Patient will call failure to improve.  If this does happen, then we will refer the patient to ENT

## 2025-03-03 ENCOUNTER — PATIENT MESSAGE (OUTPATIENT)
Dept: OTOLARYNGOLOGY | Facility: CLINIC | Age: 35
End: 2025-03-03
Payer: COMMERCIAL

## 2025-03-27 ENCOUNTER — OFFICE VISIT (OUTPATIENT)
Dept: OTOLARYNGOLOGY | Facility: CLINIC | Age: 35
End: 2025-03-27
Attending: FAMILY MEDICINE
Payer: COMMERCIAL

## 2025-03-27 ENCOUNTER — CLINICAL SUPPORT (OUTPATIENT)
Dept: OTOLARYNGOLOGY | Facility: CLINIC | Age: 35
End: 2025-03-27
Payer: COMMERCIAL

## 2025-03-27 VITALS
DIASTOLIC BLOOD PRESSURE: 66 MMHG | HEIGHT: 66 IN | HEART RATE: 68 BPM | SYSTOLIC BLOOD PRESSURE: 99 MMHG | BODY MASS INDEX: 21.21 KG/M2 | WEIGHT: 132 LBS

## 2025-03-27 DIAGNOSIS — H91.90 HEARING LOSS, UNSPECIFIED HEARING LOSS TYPE, UNSPECIFIED LATERALITY: Primary | ICD-10-CM

## 2025-03-27 DIAGNOSIS — R09.81 NASAL CONGESTION: ICD-10-CM

## 2025-03-27 DIAGNOSIS — H81.20 VESTIBULAR NEURONITIS, UNSPECIFIED LATERALITY: Primary | ICD-10-CM

## 2025-03-27 DIAGNOSIS — H93.13 BILATERAL TINNITUS: ICD-10-CM

## 2025-03-27 DIAGNOSIS — H69.93 DYSFUNCTION OF BOTH EUSTACHIAN TUBES: ICD-10-CM

## 2025-03-27 DIAGNOSIS — R44.8 FACIAL PRESSURE: ICD-10-CM

## 2025-03-27 DIAGNOSIS — Z01.10 ENCOUNTER FOR HEARING EXAMINATION WITHOUT ABNORMAL FINDINGS: ICD-10-CM

## 2025-03-27 DIAGNOSIS — R29.2 ABNORMAL ACOUSTIC REFLEX: ICD-10-CM

## 2025-03-27 DIAGNOSIS — R42 DIZZINESS: Primary | ICD-10-CM

## 2025-03-27 PROCEDURE — 99204 OFFICE O/P NEW MOD 45 MIN: CPT | Mod: S$GLB,,, | Performed by: STUDENT IN AN ORGANIZED HEALTH CARE EDUCATION/TRAINING PROGRAM

## 2025-03-27 RX ORDER — MOMETASONE FUROATE MONOHYDRATE 50 UG/1
2 SPRAY, METERED NASAL DAILY
Qty: 17 G | Refills: 2 | Status: SHIPPED | OUTPATIENT
Start: 2025-03-27

## 2025-03-27 RX ORDER — AZELASTINE 1 MG/ML
1 SPRAY, METERED NASAL 2 TIMES DAILY
Qty: 30 ML | Refills: 2 | Status: SHIPPED | OUTPATIENT
Start: 2025-03-27 | End: 2025-04-26

## 2025-03-27 NOTE — Clinical Note
Your patient, Haley Henriquez, was recently seen for an audiogram.  My assessment and recommendations are enclosed.  If you should have any questions or concerns, please contact me at [phone].   Sincerely, Nga Bowling, CCC-A Audiologist [facility]

## 2025-03-27 NOTE — PROGRESS NOTES
Ear, Nose, & Throat  Otolaryngology - Head & Neck Surgery        Subjective:     Chief Complaint:   Chief Complaint   Patient presents with    Dizziness       Haley Henriquez is a 35 y.o. female who was referred to me by Dr. Ricardo Matute* in consultation for dizziness.    She describes prolonged and non-positional room-spinning vertigo which is not associated with fluctuating aural symptoms.     Patient states she had a flight in mid January.  After leaning she had a gradual onset of disequilibrium which eventually developed into constant room spinning vertigo.  She states that this intense vertigo lasted roughly about 3 days for which she was bed ridden and had nausea.  She had a friend drive her to a primary care appointment following day.  Received meclizine which helps somewhat.  Symptoms slowly resolved over the following 7 days.  She has some baseline brief disequilibrium upon standing which has been present since prior to this more intense episode.  She has never had an episode similar to this in the past.    Since this initial episode she has not had anymore room spinning vertigo.    Minimum Duration: days  Maximum Duration: days  Triggers: none reported    She does have a history of migraines with auras. Associated migraine-type symptoms include: photophobia.     Otologic history:  Other risk factors include:  Migraines, history of panic attacks .    SINUS  She also wishes to discuss her chronic sinus issues    She states that she has frequent sinonasal symptoms which accompany bilateral aural pressure.  She feels that she is unable to clear her ears as easily.  She has plans for a diving trip coming up.  Also flies frequently.  No history of any ear infections.    Current sinonasal medications include Zyrtec p.r.n. . Previously tried:  Flonase    She does not recall taking antibiotics for sinus infections. The last course of antibiotics was a long time ago.  She does not regularly use nasal  decongestant sprays.    Associated symptoms include congestion, post-nasal drip, facial pressure, or ocular pruritis    Denies any purulent rhinorrhea, hyposmia, or sneezing    She does have symptoms consistent with nasal histamine release including ocular pruritus . She does not recall previously having allergy testing that resulted in immunotherapy. .     She does have a history of asthma which is currently managed with p.r.n. nebulizer.. She does not have a current or previous history of eczema.     She feels that on her best days she breathes well through both sides of the nose.    She has not had previous sinonasal surgery.       Past Medical History  Active Ambulatory Problems     Diagnosis Date Noted    BRCA2 positive 11/21/2016    Family history of breast cancer 11/21/2016    STEPHANIE (generalized anxiety disorder) 12/10/2018    Panic attacks 12/10/2018    Depression 12/10/2018    Moderate persistent asthma with acute exacerbation     Abnormal brain scan 01/31/2023    Chronic nonintractable headache 01/31/2023    Raynaud's phenomenon without gangrene 01/31/2023    Bilateral occipital neuralgia 01/31/2023     Resolved Ambulatory Problems     Diagnosis Date Noted    History of colposcopy with cervical biopsy 11/21/2016    Screening mammogram, encounter for     Old tear of lateral meniscus of right knee 12/08/2017    Acute pharyngitis 05/06/2019    At high risk for breast cancer 08/27/2020     Past Medical History:   Diagnosis Date    Allergy     Anxiety     Asthma     Headache     Hx of psychiatric care     Panic disorder     Psychiatric problem     Therapy        Past Surgical History  She has a past surgical history that includes Colposcopy and Knee surgery.    Past Surgical History:   Procedure Laterality Date    COLPOSCOPY      KNEE SURGERY          Family History  Her family history includes Anxiety disorder in her brother, father, maternal grandfather, maternal grandmother, paternal grandfather, and paternal  "grandmother; BRCA 1/2 in her mother; Breast cancer in her maternal aunt; Depression in her brother, father, maternal grandfather, maternal grandmother, paternal grandfather, and paternal grandmother; Ovarian cancer in her maternal grandmother.    Social History  She reports that she has never smoked. She has never used smokeless tobacco. She reports current alcohol use. She reports that she does not currently use drugs after having used the following drugs: Marijuana. Frequency: 1.00 time per week.    Allergies  She has no known allergies.    Medications  She has a current medication list which includes the following prescription(s): meclizine, ondansetron, promethazine, quetiapine, sertraline, zoloft, azelastine, fluvoxamine, meloxicam, and mometasone.    ROS:  Pertinent positive and negative review of systems as noted in HPI.     Objective:     BP 99/66 (BP Location: Left arm, Patient Position: Sitting)   Pulse 68   Ht 5' 6" (1.676 m)   Wt 59.9 kg (132 lb)   BMI 21.31 kg/m²      ORTHOSTATICS  Normal today, see nursing note    Constitutional: Well appearing, communicating. No acute distress  Eyes:    Pupils: Equal and reactive  EOM: Intact bilaterally  Head/Face: House Brackmann I Bilaterally.  Right Ear:    Auricle normally developed   EAC: normal   Tympanic membrane: intact   Middle Ear: No effusion present, Ossicles in normal position, and Auto-insufflates  Left Ear:    Auricle normally developed   EAC: normal   Tympanic membrane: intact   Middle Ear: No effusion present, Ossicles in normal position, and Auto-insufflates  Vestibular:   Spontaneous Nystagmus: none   Smooth Pursuit: grossly unremarkable   Saccades: grossly unremarkable     Gaze-Evoked Nystagmus: None   Head-Impulse: DNT   Fixation Suppression: DNT  Gait: Normal    Lori-Hallpike: DNT    Passive Head Shake: DNT    Test of Skew: DNT   Fakuda Step Test: DNT  Nose:    Septum Deviated Left    severe edematous turbinate hypertrophy   non-purulent " rhinorrhea present   External valve collapse absent   Modified Lucinda DNT   Tenderness to sinus palpation in both frontal and both maxillary    Neuro/Psychiatric:     Affect: Normal   Cognition: Appropriate  Proprioception   Romberg: DNT  Respiratory: No increased WOB, no stridor     Data Review:   LABS    I have independently reviewed the lab results shown above. Findings significant for the conditions being treated include: none    IMAGING    No pertinent imaging available    AUDIO    I have independently reviewed the following audiogram and reviewed the report. Findings as follows:     Pure Tone Audiometry: Symmetric  Right - Normal (0-25 dB) Hearing  Left - Normal (0-25 dB) Hearing    Tympanometry  Right: Type A  Left: Type A    Word Discrimination Score  Right: 100%  Left 96%    Acoustic Reflexes  Right Stimulation - Right reflex: DNT  Left Reflex: DNT  Left Stimulation - Right reflex: DNT  Left Reflex: DNT    Procedures:       Assessment:     1. Vestibular neuronitis, unspecified laterality    2. Dysfunction of both eustachian tubes    3. Facial pressure    4. Nasal congestion      Plan:     I had a long discussion with the patient regarding her condition and the further workup and management options.  As I discussed with her, her dizziness sounds consistent with an episode of vestibular neuritis.  Some consideration also given for vestibular migraines given her history.  We will consider vestibular migraines more if this recurs.    Tabatha's bear induced obstructive Eustachian tube dysfunction during diving and air travel.  This has likely exacerbated by uncontrolled sinonasal disease.  Discussed a trial of Nasonex, azelastine, and nasal saline irrigations.  She also has chronic bilateral maxillary and frontal pressure.  Threshold to get CT sinus if this does not resolve with medical therapy.    Voice recognition software was used in the creation of this note/communication and any sound-alike errors which may  have occurred from its use should be taken in context when interpreting. If such errors prevent a clear understanding of the note/communication, please contact the office for clarification.     No orders of the defined types were placed in this encounter.     Medications Ordered This Encounter   Medications    azelastine (ASTELIN) 137 mcg (0.1 %) nasal spray    mometasone (NASONEX) 50 mcg/actuation nasal spray

## 2025-03-27 NOTE — PROGRESS NOTES
Nga Bowling, CCC-A  Audiologist - [facility] [address] [place] [address]  [email or web address]  [phone]    Patient: Haley Henriquez   MRN: [id number]  [id number]    Home Phone [phone]   Work Phone Not on file.   Mobile [phone]   : 1990  BRANCH: 3/27/2025      AUDIOLOGICAL EVALUATION    Ms. Haley Henriquez was seen in the clinic today for an audiological evaluation.  Ms. Henriquez reported tinnitus and dizziness.  Ms. Henriquez denied decreased hearing, history of noise exposure, and family history of hearing loss.    Audiological testing revealed normal hearing sensitivity across all frequencies for the Right ear and normal hearing sensitivity across all frequencies for the Left ear.  A speech reception threshold was obtained at 15 dB HL for the Right ear and at 15 dB HL for the Left ear.  Speech discrimination was 100% for the Right ear and 96% for the Left ear.      Tympanometry testing revealed a Type A tympanogram for the Right ear and a Type A tympanogram for the Left ear.  Acoustic reflex thresholds were elevated/absent ipsilaterally in both ears.      RECOMMENDATIONS:   It is recommended that Haley Henriquez:  Follow up medically with Dr. Garay.  Use precaution and/or hearing protection in noisy environments.    If you should have any questions or concerns regarding the above information, please do not hesitate to contact me at [phone].      _______________________________  Nga Bowling, ROMY-A  Audiologist

## 2025-04-08 ENCOUNTER — PATIENT MESSAGE (OUTPATIENT)
Dept: GYNECOLOGIC ONCOLOGY | Facility: CLINIC | Age: 35
End: 2025-04-08
Payer: COMMERCIAL

## 2025-04-24 ENCOUNTER — OFFICE VISIT (OUTPATIENT)
Dept: OTOLARYNGOLOGY | Facility: CLINIC | Age: 35
End: 2025-04-24
Payer: COMMERCIAL

## 2025-04-24 VITALS
HEIGHT: 66 IN | DIASTOLIC BLOOD PRESSURE: 60 MMHG | SYSTOLIC BLOOD PRESSURE: 113 MMHG | WEIGHT: 130.06 LBS | HEART RATE: 77 BPM | BODY MASS INDEX: 20.9 KG/M2

## 2025-04-24 DIAGNOSIS — H69.93 DYSFUNCTION OF BOTH EUSTACHIAN TUBES: ICD-10-CM

## 2025-04-24 DIAGNOSIS — R53.83 OTHER FATIGUE: ICD-10-CM

## 2025-04-24 DIAGNOSIS — J32.9 CHRONIC SINUSITIS, UNSPECIFIED LOCATION: Primary | ICD-10-CM

## 2025-04-24 DIAGNOSIS — R44.8 FACIAL PRESSURE: ICD-10-CM

## 2025-04-24 DIAGNOSIS — R09.81 NASAL CONGESTION: ICD-10-CM

## 2025-04-24 PROCEDURE — 99214 OFFICE O/P EST MOD 30 MIN: CPT | Mod: S$GLB,,, | Performed by: STUDENT IN AN ORGANIZED HEALTH CARE EDUCATION/TRAINING PROGRAM

## 2025-04-24 NOTE — PROGRESS NOTES
Ear, Nose, & Throat  Otolaryngology - Head & Neck Surgery        Subjective:     Chief Complaint:   Chief Complaint   Patient presents with    Follow-up     Pt states that she feels pressure in her cheeks. Notes that she experiences a feeling of pressure in various parts of her face constantly.  Denies vertigo and mentions that her last episode of vertigo was in January. She mentioned that she feels light-headed  a few times a week. She also reports migraines that occur approximately once a week. The migraines last for days and are not relieved by OTC medication. She states the her h/a pain can get up to an 8/10.      04/24/2025:  Returns today after trial of sinonasal regimen.  She is no longer having any dizziness.  She still feels frequent bilateral ear pressure, facial pressure which she localizes to her maxillary sinus and frontal recesses bilaterally.    HPI:  Haley Henriquez is a 35 y.o. female who was referred to me by Dr. Ricardo Matute* in consultation for dizziness.    She describes prolonged and non-positional room-spinning vertigo which is not associated with fluctuating aural symptoms.     Patient states she had a flight in mid January.  After leaning she had a gradual onset of disequilibrium which eventually developed into constant room spinning vertigo.  She states that this intense vertigo lasted roughly about 3 days for which she was bed ridden and had nausea.  She had a friend drive her to a primary care appointment following day.  Received meclizine which helps somewhat.  Symptoms slowly resolved over the following 7 days.  She has some baseline brief disequilibrium upon standing which has been present since prior to this more intense episode.  She has never had an episode similar to this in the past.    Since this initial episode she has not had anymore room spinning vertigo.    Minimum Duration: days  Maximum Duration: days  Triggers: none reported    She does have a history of  migraines with auras. Associated migraine-type symptoms include: photophobia.     Otologic history:  Other risk factors include:  Migraines, history of panic attacks .    SINUS  She also wishes to discuss her chronic sinus issues    She states that she has frequent sinonasal symptoms which accompany bilateral aural pressure.  She feels that she is unable to clear her ears as easily.  She has plans for a diving trip coming up.  Also flies frequently.  No history of any ear infections.    Current sinonasal medications include Zyrtec p.r.n. . Previously tried:  Flonase    She does not recall taking antibiotics for sinus infections. The last course of antibiotics was a long time ago.  She does not regularly use nasal decongestant sprays.    Associated symptoms include congestion, post-nasal drip, facial pressure, or ocular pruritis    Denies any purulent rhinorrhea, hyposmia, or sneezing    She does have symptoms consistent with nasal histamine release including ocular pruritus . She does not recall previously having allergy testing that resulted in immunotherapy. .     She does have a history of asthma which is currently managed with p.r.n. nebulizer.. She does not have a current or previous history of eczema.     She feels that on her best days she breathes well through both sides of the nose.    She has not had previous sinonasal surgery.       Past Medical History  Active Ambulatory Problems     Diagnosis Date Noted    BRCA2 positive 11/21/2016    Family history of breast cancer 11/21/2016    STEPHANIE (generalized anxiety disorder) 12/10/2018    Panic attacks 12/10/2018    Depression 12/10/2018    Moderate persistent asthma with acute exacerbation     Abnormal brain scan 01/31/2023    Chronic nonintractable headache 01/31/2023    Raynaud's phenomenon without gangrene 01/31/2023    Bilateral occipital neuralgia 01/31/2023     Resolved Ambulatory Problems     Diagnosis Date Noted    History of colposcopy with cervical  "biopsy 11/21/2016    Screening mammogram, encounter for     Old tear of lateral meniscus of right knee 12/08/2017    Acute pharyngitis 05/06/2019    At high risk for breast cancer 08/27/2020     Past Medical History:   Diagnosis Date    Allergy     Anxiety     Asthma     Headache     Hx of psychiatric care     Panic disorder     Psychiatric problem     Therapy        Past Surgical History  She has a past surgical history that includes Colposcopy and Knee surgery.    Past Surgical History:   Procedure Laterality Date    COLPOSCOPY      KNEE SURGERY          Family History  Her family history includes Anxiety disorder in her brother, father, maternal grandfather, maternal grandmother, paternal grandfather, and paternal grandmother; BRCA 1/2 in her mother; Breast cancer in her maternal aunt; Depression in her brother, father, maternal grandfather, maternal grandmother, paternal grandfather, and paternal grandmother; Ovarian cancer in her maternal grandmother.    Social History  She reports that she has never smoked. She has never used smokeless tobacco. She reports current alcohol use. She reports that she does not currently use drugs after having used the following drugs: Marijuana. Frequency: 1.00 time per week.    Allergies  She has no known allergies.    Medications  She has a current medication list which includes the following prescription(s): azelastine, fluvoxamine, meclizine, meloxicam, mometasone, ondansetron, promethazine, quetiapine, sertraline, and zoloft.    ROS:  Pertinent positive and negative review of systems as noted in HPI.     Objective:     /60 (BP Location: Left arm, Patient Position: Sitting)   Pulse 77   Ht 5' 6" (1.676 m)   Wt 59 kg (130 lb 1.1 oz)   BMI 20.99 kg/m²      ORTHOSTATICS  Normal today, see nursing note    Constitutional: Well appearing, communicating. No acute distress  Eyes:    Pupils: Equal and reactive  EOM: Intact bilaterally  Head/Face: House Brackmann I " Bilaterally.  Right Ear:    Auricle normally developed   EAC: normal   Tympanic membrane: intact   Middle Ear: No effusion present, Ossicles in normal position, and Auto-insufflates  Left Ear:    Auricle normally developed   EAC: normal   Tympanic membrane: intact   Middle Ear: No effusion present, Ossicles in normal position, and Auto-insufflates  Nose:    Septum Deviated Left    severe edematous turbinate hypertrophy   non-purulent rhinorrhea present   External valve collapse absent   Modified [place] DNT   Tenderness to sinus palpation in both frontal and both maxillary    Neuro/Psychiatric:     Affect: Normal   Cognition: Appropriate  Proprioception   Romberg: DNT  Respiratory: No increased WOB, no stridor     Data Review:   LABS    I have independently reviewed the lab results shown above. Findings significant for the conditions being treated include: none    IMAGING    No pertinent imaging available    AUDIO    I have independently reviewed the following audiogram and reviewed the report. Findings as follows:     Pure Tone Audiometry: Symmetric  Right - Normal (0-25 dB) Hearing  Left - Normal (0-25 dB) Hearing    Tympanometry  Right: Type A  Left: Type A    Word Discrimination Score  Right: 100%  Left 96%    Acoustic Reflexes  Right Stimulation - Right reflex: DNT  Left Reflex: DNT  Left Stimulation - Right reflex: DNT  Left Reflex: DNT    Procedures:       Assessment:     1. Chronic sinusitis, unspecified location    2. Dysfunction of both eustachian tubes    3. Facial pressure    4. Nasal congestion    5. Other fatigue        Plan:     I had a long discussion with the patient regarding her condition and the further workup and management options. Her dizziness seems to have resolved.    She still has chronic facial pressure, congestion, and aural fullness along with chronic fatigue.  We will get a CT of the sinus to rule out chronic sinus disease.  We will also get strep pneumo titers.  Her ear fullness was  previously lisa-induced. Now more frequent.  We will try to fix underlying sinonasal disease first    Voice recognition software was used in the creation of this note/communication and any sound-alike errors which may have occurred from its use should be taken in context when interpreting. If such errors prevent a clear understanding of the note/communication, please contact the office for clarification.     Orders Placed This Encounter   Procedures    CT Sinuses without Contrast    S.pneumoniae (IgG) AB

## 2025-04-29 ENCOUNTER — HOSPITAL ENCOUNTER (OUTPATIENT)
Dept: RADIOLOGY | Facility: OTHER | Age: 35
Discharge: HOME | End: 2025-04-29
Attending: STUDENT IN AN ORGANIZED HEALTH CARE EDUCATION/TRAINING PROGRAM
Payer: COMMERCIAL

## 2025-04-29 DIAGNOSIS — J32.9 CHRONIC SINUSITIS, UNSPECIFIED LOCATION: ICD-10-CM

## 2025-04-29 PROCEDURE — 70486 CT MAXILLOFACIAL W/O DYE: CPT | Mod: TC

## 2025-04-29 PROCEDURE — 70486 CT MAXILLOFACIAL W/O DYE: CPT | Mod: 26,,, | Performed by: RADIOLOGY

## 2025-05-06 ENCOUNTER — RESULTS FOLLOW-UP (OUTPATIENT)
Dept: OTOLARYNGOLOGY | Facility: CLINIC | Age: 35
End: 2025-05-06

## 2025-05-07 ENCOUNTER — PATIENT MESSAGE (OUTPATIENT)
Dept: OTOLARYNGOLOGY | Facility: CLINIC | Age: 35
End: 2025-05-07
Payer: COMMERCIAL

## 2025-05-08 ENCOUNTER — TELEPHONE (OUTPATIENT)
Dept: OTOLARYNGOLOGY | Facility: CLINIC | Age: 35
End: 2025-05-08
Payer: COMMERCIAL

## 2025-05-10 ENCOUNTER — PATIENT MESSAGE (OUTPATIENT)
Dept: GYNECOLOGIC ONCOLOGY | Facility: CLINIC | Age: 35
End: 2025-05-10
Payer: COMMERCIAL

## 2025-05-15 ENCOUNTER — OFFICE VISIT (OUTPATIENT)
Dept: INTERNAL MEDICINE | Facility: CLINIC | Age: 35
End: 2025-05-15
Attending: FAMILY MEDICINE
Payer: COMMERCIAL

## 2025-05-15 DIAGNOSIS — R14.0 ABDOMINAL BLOATING: ICD-10-CM

## 2025-05-15 DIAGNOSIS — R10.84 GENERALIZED ABDOMINAL PAIN: Primary | ICD-10-CM

## 2025-05-15 RX ORDER — DICYCLOMINE HYDROCHLORIDE 20 MG/1
20 TABLET ORAL 3 TIMES DAILY
Qty: 90 TABLET | Refills: 5 | Status: SHIPPED | OUTPATIENT
Start: 2025-05-15

## 2025-05-15 RX ORDER — SERTRALINE HYDROCHLORIDE 25 MG/1
25 TABLET, FILM COATED ORAL DAILY
COMMUNITY
Start: 2025-04-28

## 2025-05-15 NOTE — PROGRESS NOTES
The patient location is:  Home  The chief complaint leading to consultation is:  Abdominal discomfort    Visit type: audiovisual    Face to Face time with patient:  10 minutes  Fifteen minutes of total time spent on the encounter, which includes face to face time and non-face to face time preparing to see the patient (eg, review of tests), Obtaining and/or reviewing separately obtained history, Documenting clinical information in the electronic or other health record, Independently interpreting results (not separately reported) and communicating results to the patient/family/caregiver, or Care coordination (not separately reported).         Each patient to whom he or she provides medical services by telemedicine is:  (1) informed of the relationship between the physician and patient and the respective role of any other health care provider with respect to management of the patient; and (2) notified that he or she may decline to receive medical services by telemedicine and may withdraw from such care at any time.    Notes:     CHIEF COMPLAINT: The patient complains of >1 year post prandial bloating and abd pain    HISTORY OF PRESENT ILLNESS: The patient complains of >1 year post prandial bloating and abd pain.  The patient denies emesis.  Lots of nausea as well.      REVIEW OF SYSTEMS:  GENERAL: No fever, chills, fatigability or weight loss.  SKIN: No rashes, itching or changes in color or texture of skin.  HEAD: No headaches or recent head trauma.  EYES: Visual acuity fine. No photophobia, ocular pain or diplopia.  EARS: Denies ear pain, discharge.  NOSE: No loss of smell, no epistaxis or postnasal drip.  MOUTH & THROAT: No hoarseness or change in voice. No excessive gum bleeding.  NODES: Denies swollen glands.  CHEST: Denies BRANCH, cyanosis, wheezing, cough and sputum production.  CARDIOVASCULAR: Denies chest pain, PND, orthopnea or reduced exercise tolerance.  ABDOMEN:  No weight loss. Denies hematemesis or blood in  stool.  URINARY: No flank pain, dysuria or hematuria.  PERIPHERAL VASCULAR: No claudication or cyanosis.  MUSCULOSKELETAL: No joint stiffness or swelling. Denies back pain.  NEUROLOGIC: No history of seizures, paralysis.    SOCIAL HISTORY: Unchanged since recent note by PCP.    PHYSICAL EXAMINATION:   Last menstrual period 05/07/2025.  APPEARANCE: Well nourished, well developed.    HEAD: Normocephalic, atraumatic.  EYES: PERRL. EOMI.  Conjunctivae without injection and  anicteric  NEUROLOGIC:       Normal speech development.      Hearing normal.  PSYCHIATRIC: Patient is alert and oriented x3.  Thought processes are all normal.  There is no homicidality.  There is no suicidality.  There is no evidence of psychosis.    LABORATORY/RADIOLOGY: Chart reviewed.    ASSESSMENT:   >1 year post prandial bloating and abd pain    PLAN:  [place]  [place]  Answers submitted by the patient for this visit:  Abdominal Pain Questionnaire (Submitted on 5/15/2025)  Chief Complaint: Abdominal pain  Chronicity: recurrent  Onset: more than 1 year ago  Onset quality: sudden  Frequency: intermittently  Episode duration: 3 Hours  Progression since onset: gradually worsening  Pain location: RUQ  Pain - numeric: 3/10  Pain quality: cramping, sharp  anorexia: No  arthralgias: No  belching: No  constipation: Yes  diarrhea: No  dysuria: No  fever: No  flatus: Yes  frequency: No  headaches: Yes  hematochezia: No  hematuria: No  melena: No  myalgias: No  nausea: Yes  weight loss: No  vomiting: No  Aggravated by: nothing  Relieved by: activity, movement, palpation, passing flatus  Diagnostic workup: ultrasound  Pain severity: mild  Treatments tried: acetaminophen  Improvement on treatment: no relief  abdominal surgery: No  colon cancer: No  Crohn's disease: No  gallstones: No  GERD: No  irritable bowel syndrome: No  kidney stones: No  pancreatitis: No  PUD: No  ulcerative colitis: No  UTI: No

## 2025-05-19 ENCOUNTER — TELEPHONE (OUTPATIENT)
Dept: GYNECOLOGIC ONCOLOGY | Facility: CLINIC | Age: 35
End: 2025-05-19
Payer: COMMERCIAL

## 2025-05-19 ENCOUNTER — TELEPHONE (OUTPATIENT)
Dept: SURGERY | Facility: CLINIC | Age: 35
End: 2025-05-19
Payer: COMMERCIAL

## 2025-05-19 ENCOUNTER — PATIENT MESSAGE (OUTPATIENT)
Dept: INTERNAL MEDICINE | Facility: CLINIC | Age: 35
End: 2025-05-19
Payer: COMMERCIAL

## 2025-05-19 NOTE — TELEPHONE ENCOUNTER
----- Message from Cheri sent at 5/19/2025 11:13 AM CDT -----  Regarding: FW: New Patient Referral    ----- Message -----  From: Jenny Armstrong RN  Sent: 5/19/2025  10:58 AM CDT  To: [facility] Clinical S#  Subject: New Patient Referral                             Good morning, This patient has had a referral placed since 01/2025 for BRCA 2+ and has still not heard back about scheduling a new patient appointment. Can someone reach out to schedule the patient at her earliest convenience? Thanks,Jenny DAVIDSON

## 2025-05-20 ENCOUNTER — OFFICE VISIT (OUTPATIENT)
Dept: HEMATOLOGY/ONCOLOGY | Facility: CLINIC | Age: 35
End: 2025-05-20
Payer: COMMERCIAL

## 2025-05-20 ENCOUNTER — PATIENT MESSAGE (OUTPATIENT)
Dept: ADMINISTRATIVE | Facility: OTHER | Age: 35
End: 2025-05-20
Payer: COMMERCIAL

## 2025-05-20 DIAGNOSIS — Z80.3 FAMILY HISTORY OF BREAST CANCER: ICD-10-CM

## 2025-05-20 DIAGNOSIS — Z15.01 BRCA2 POSITIVE: Primary | ICD-10-CM

## 2025-05-20 DIAGNOSIS — Z91.89 AT HIGH RISK FOR BREAST CANCER: ICD-10-CM

## 2025-05-20 DIAGNOSIS — R92.343 EXTREMELY DENSE TISSUE OF BOTH BREASTS ON MAMMOGRAPHY: ICD-10-CM

## 2025-05-20 DIAGNOSIS — Z12.39 BREAST CANCER SCREENING, HIGH RISK PATIENT: ICD-10-CM

## 2025-05-20 DIAGNOSIS — Z00.00 PREVENTATIVE HEALTH CARE: ICD-10-CM

## 2025-05-20 DIAGNOSIS — Z15.09 BRCA2 POSITIVE: Primary | ICD-10-CM

## 2025-05-20 NOTE — PROGRESS NOTES
"The patient location is: [place]  The chief complaint leading to consultation is: high risk breast     Visit type: audiovisual      70 minutes of total time spent on the encounter, which includes face to face time and non-face to face time preparing to see the patient (eg, review of tests), Obtaining and/or reviewing separately obtained history, Documenting clinical information in the electronic or other health record, Independently interpreting results (not separately reported) and communicating results to the patient/family/caregiver, or Care coordination (not separately reported).         Each patient to whom he or she provides medical services by telemedicine is:  (1) informed of the relationship between the physician and patient and the respective role of any other health care provider with respect to management of the patient; and (2) notified that he or she may decline to receive medical services by telemedicine and may withdraw from such care at any time.    Notes:       [facility] note      Reason For Consultation:   high-risk for breast cancer      Referring Provider:   Ricardo Conrad MD  [address] [address]    Records Obtained: Records of the patients history including those obtained from the referring provider were reviewed and summarized in detail.    HPI:   Haley Henriquez is a 35 y.o. who presents for consultation of increased risk of breast cancer.  She was tested for BRCA in 2008 after a positive test in her family. This was in [place] and she noreen not recall full genetic counseling.       Today, Feels good.   No breast concerns.  Bloating and mild pain in abd. She is scheduled to see GI.    Breast imaging reviewed    High Risk Breast cancer specific history:  - Age: 35 y.o.   - Height/Weight:  Estimated body surface area is 1.66 meters squared as calculated from the following:    Height as of 4/24/25: 5' 6" (1.676 m).    Weight as of 4/24/25: 59 " kg (130 lb 1.1 oz).  - There is no height or weight on file to calculate BMI.  - Breast density per BI-RADS:   d - Extremely dense   - Age at menarche:   13  - Number of pregnancies: ; age of first live birth:    - History of breast feeding:      -Uterus and ovaries intact: Yes  - Menopausal status: premenopausal. Age at menopause, if applicable:      - HRT: No  - Genetic testing:  Yes  Positive for BRCA2 mutation  - Personal history of cancer: No  - Previous chest radiation exposure between ages 10-30 years old: No  - Personal history of breast biopsy:  No  - Ashkenazi Yarsanism Inheritance:  No Other   - Family history of cancer:    Cancer-related family history includes Breast cancer in her maternal aunt and maternal aunt; Breast cancer (age of onset: 27) in her maternal aunt; Ovarian cancer in her maternal grandmother.    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     Past Medical   Past Medical History:   Diagnosis Date    Allergy     Anxiety     Asthma     BRCA2 positive     Depression     Headache     Hx of psychiatric care     Panic disorder     Psychiatric problem     Therapy    Problem List[1]  Family History  Family History   Problem Relation Name Age of Onset    BRCA 1/2 Mother      Depression Father      Anxiety disorder Father      Depression Brother      Anxiety disorder Brother      Ovarian cancer Maternal Grandmother      Anxiety disorder Maternal Grandmother      Depression Maternal Grandmother      Anxiety disorder Maternal Grandfather      Depression Maternal Grandfather      Anxiety disorder Paternal Grandmother      Depression Paternal Grandmother      Anxiety disorder Paternal Grandfather      Depression Paternal  Grandfather      Breast cancer Maternal Aunt x 3     Breast cancer Maternal Aunt      Breast cancer Maternal Aunt  27        BRCA2    Colon cancer Neg Hx       Medications  Current Medications[2]  Allergies  Review of patient's allergies indicates:  No Known Allergies    Review of Systems       See above   All other systems reviewed and are negative.    Objective:      Vitals: There were no vitals filed for this visit.  BMI: There is no height or weight on file to calculate BMI.   There is no height or weight on file to calculate BSA.    Physical Exam  Limited as virtual  Appears comfortable      Laboratory Data: reviewed most recent   Imaging: reviewed most recent      General Education discussed:      1 in 11 women diagnosed with breast cancer in the [place] were under 46yo.       Individuals should undergo breast cancer risk assessment by age 25 years and be counseled regarding potential benefits, risks, and limitations of breast screening in the context of their risk stratification.       1. General education: (not patient specific)    Risk factors associated with breast cancer are categorized into 2 groups: Modifiable and Non-modifiable. Modifiable risk factors include use of hormones, alcohol, smoking, diet and exercise. Non-modifiable risk factors include breast density, genetics, chest radiation, previous pregnancies, age of first period, and age of menopause.     Factors associated with greater breast cancer risk: (this list is not patient specific)  -Increasing age The risk of breast cancer increases with older age.  -Female sex  -White race (In the [place], the highest breast cancer risk occurs among White women, although breast cancer remains the most common cancer among women of every major ethnic/racial group )  -Weight and body fat in postmenopausal women -Obesity (defined as body mass index [BMI] >=30 kg/m2) is associated with an overall increase  in morbidity and mortality. However, the  risk of breast cancer associated with BMI differs by menopausal status.   ?Postmenopausal women - A higher BMI and/or perimenopausal weight gain have been consistently associated with a higher risk of breast cancer among postmenopausal women. The association between a higher BMI and postmenopausal breast cancer risk may be mediated by higher estrogen levels resulting from the peripheral conversion of estrogen precursors (from adipose tissue) to estrogen   -Tall stature -women who were >175 cm (69 inches) tall were 20 percent more likely to develop breast cancer than those <160 cm (63 inches) tall.  -Benign breast disease  Benign breast disease represents a spectrum of disorders that come to clinical attention because of patient symptoms (such as breast pain), palpable lesions or other findings on physical examination, or as imaging abnormalities. Following establishment of a benign diagnosis, treatment in general is aimed at symptomatic relief and patient education.  Some benign breast diseases, such as atypical hyperplasia or lobular carcinoma in situ, confer an increase in the patient's future risk of developing breast cancer and should lead to counseling about screening recommendations and risk reduction strategies. These lesions are considered as risk markers, rather than as premalignant lesions, because those cancers that subsequently develop are not necessarily in the area of the atypia and may occur in the contralateral breast.  Benign epithelial breast lesions can be classified histologically into three categories: nonproliferative, proliferative without atypia, and atypical hyperplasia. The categorization is based upon the degree of cellular proliferation and atypia.  -Dense breast tissue -- The density of breast tissue reflects the relative amount of glandular and connective tissue (parenchyma) to adipose tissue. Women with mammographically dense breast tissue, generally defined as dense tissue comprising  >=75 percent of the breast, have a four to five times higher breast cancer risk compared with women of similar age with less or no dense tissue. Although breast density is a largely inherited trait, other factors can influence density. For example, lower density has been associated with higher levels of physical activity  and with a low-fat, high-carbohydrate diet. In postmenopausal women, estrogen and progesterone increase breast density  while the ER antagonist tamoxifen decreases breast density.  Despite the association of exogenous hormones with breast density, breast density is not strongly correlated with endogenous hormone levels. Breast tend to become more fatty with age.   Dense breasts -  occurring in an estimated 50% of women in their 40s, 40% in their 50s, and 25% of women older than 60. Density can make it more difficult to detect breast cancer and increases breast cancer risk, both of which suggest that additional imaging can improve early diagnosis of the disease.   -Bone mineral density -In multiple studies, women with higher bone density have a higher breast cancer risk  -Hormonal factors:   Of note, IVF does not appear to increase the long-term risk of breast cancer, even in women with BRCA 1 and 2 mutations.       With regard to Breast cancer -- combined oral contraceptives (IRIS)  appear to be associated with little to no increased risk of breast cancer based on observational data. Any effect appears to be temporary and limited to current or recent (within five to seven years) IRIS use. I will put a link here for  review:  Combined estrogen-progestin contraception: Side effects and health concerns - UpToDate     HRT data:     ---The Women's Health Initiative (WHI) demonstrated adverse effects of menopausal hormone therapy (MHT) in older postmenopausal women (over age 60 years or more than 10 years since menopause)   In the Women's Health Initiative (WHI), the risk of invasive breast cancer was  significantly increased with combined hormone therapy (HT) at an average follow-up of 5.6 years.     --A 2019 meta-analysis of all available epidemiologic evidence on the association between menopausal hormone therapy (MHT) use and breast cancer risk has been published. The analysis included nearly 145,000 women with breast cancer (51 percent of whom had used MHT) and nearly 425,000 without breast cancer. Their findings included:  ?Similar to the WHI, estrogen-progestin regimens were associated with excess breast cancer risk. An excess risk was also seen with estrogen-only regimens (a reduction in risk was seen in the WHI). There was no excess risk with vaginal estrogens.   ?Breast cancer risk increased with the duration of systemic MHT use. Unlike previous studies, obesity was not associated with excess risk; instead, it attenuated risk.  ?The authors of the study calculated that for women of average weight, five years of MHT use starting at age 50 years would increase their 20-year risk of breast cancer (between the ages of 50 and 69 years) by approximately:  One in every 50 users of estrogen plus daily progestin  One in every 70 users of estrogen plus intermittent progestin   One in every 200 users of estrogen-only regimens   Of note: There were important limitations in this study.   While this meta-analysis has renewed concerns for some about the association between MHT and breast cancer, we continue to suggest an individualized approach when counseling symptomatic postmenopausal women about treatment. This includes putting the potential risk of breast cancer (and cardiovascular disease) in the context of the benefits of MHT (eg, relief of vasomotor symptoms, improved sleep and quality of life, and prevention of bone loss)      --(ESTIMATES OF RISK IN WOMEN 50 TO 59 YEARS):  Although most available HT clinical trial data are from women over age 60 years, estimates of the absolute risks and benefits for women  starting MHT in their 50s has been published. The intervention phase of the WHI was the source of data for the analysis. Data were expressed as the attributable excess risk or benefit for five years of combined estrogen-progestin or unopposed estrogen use in women starting treatment between ages 50 to 59 years. Overall, the risk-benefit profile was more favorable for women ages 50 to 59 compared with older women.  ?Combined estrogen-progestin therapy - Number of cases (additional or fewer) per 1000 women per five years of hormone use when compared with placebo:  Coronary heart disease (CHD) - 2.5 additional cases  Invasive breast cancer - 3 additional cases  Stroke - 2.5 additional cases  Pulmonary embolism - 3 additional cases  Colorectal cancer - 0.5 fewer cases  Endometrial cancer - No difference  Hip fracture - 1.5 fewer cases  All-cause mortality - 5 fewer events  Estrogen-alone therapy - Number of cases (additional or fewer) per 1000 women per five years of hormone use when compared with placebo  CHD - 5.5 fewer cases  Invasive breast cancer - 2.5 fewer cases  Stroke - 0.5 fewer cases  Pulmonary embolism - 1.5 additional cases  Colorectal cancer - 0.5 fewer cases  Hip fracture - 1.5 additional cases (of note, there was an overall decrease in all osteoporotic fractures in both the estrogen and combined estrogen-progestin groups)  All-cause mortality - 5.5 fewer events    This analysis highlights that the overall risks of HT in younger postmenopausal women are considerably lower than those for older women (eg, women in the WHI). The major explanation for the difference in absolute excess risk between older and younger postmenopausal women is the lower baseline risk of CHD, stroke, venous thromboembolism (VTE), and breast cancer in younger postmenopausal women.  Menopausal hormone therapy: Benefits and risks - UpToDate       -Reproductive factors -Earlier menarche (before age 12) or later menopause (after age  52), Nulliparity, Increasing age at first full-term pregnancy.   (Of note, It is estimated that for every 12 months of breastfeeding, there was a 4.3 percent reduction in the relative risk (RR) of breast cancer)  -Personal and family history of breast cancer  -Alcohol use and smoking  -Exposure to therapeutic ionizing radiation           2. Risk stratifying models:  There are several models available for stratifying breast cancer risk, and Ivon-Carisa is presently the model utilized by [facility] and is a model recommended per current NCCN guidelines.      Educational videos:   What Is a TC Score?    https://youtu.be/Feej9KQAqfG     What If I Have a High-Risk TC Score?     https://youtu.be/kGk1mHq3x0J      The Magali Model for Breast Cancer risk estimates the absolute 5 year risk and lifetime risk of developing breast cancer. Family history includes only first degree relatives with breast cancer, which is not enough information to estimate the risk of a patient having BRCA mutation. It also underestimates the cancer risk for patients with extensive family history. The Magali Model is a good predictor of risk for populations but not for individuals. It adjusts risk for race/ethnicity. It may underestimate breast cancer risk in patients with atypical hyperplasia and strong family history. The Magali Model was NOT designed to estimate risk for: Women with a prior diagnosis of breast cancer, lobular carcinoma in situ (LCIS), or ductal carcinoma in situ (DCIS);  Women who have received previous radiation therapy to the chest for treatment of Hodgkin lymphoma;  Women with gene mutations in BRCA1 or BRCA2, or those who are known to have certain genetic syndromes that increase risk for breast cancer; Women of age <35 or >85.    There are limitations to every model for risk assessment, particularly that TC can overestimate risk in women with atypical hyperplasia and dense breasts and that Magali underestimates risk for  those with a strong family history of breast or ovarian cancers as well as non-white women with atypical hyperplasia which can make them appear to not be candidates for risk reducing therapies.         3. High risk patients:       INCREASED RISK SCREENING: per NCCN                      MRI breast:   The use of MRI for breast cancer detection is based on the concept of  tumor angiogenesis or neovascularity. Tumor-associated blood vessels have increased permeability, which leads to prompt uptake and release of gadolinium within the first one to two minutes after administration, leading to a pattern of rapid enhancement and washout on MRI.   Bilateral breast examination - Both breasts should be evaluated in an MRI study, for comparison purposes, even when concern about possible pathology involves only one breast.  Contrast - Intravenous gadolinium contrast must be used to maximize cancer detection and is administered before breast MRI to highlight the neovascularity associated with cancers. Contrast is not necessary when the study is performed to evaluate silicone implant integrity.  Allergic and anaphylactoid reactions to gadolinium are rare, but can occur. In addition, in patients with renal failure, gadolinium can cause contrast nephropathy and/or nephrogenic systemic fibrosis.   A few studies have also reported gadolinium deposition in the brain from repeated intravenous administration, with the degree of deposition varying based on the specific contrast agent. The clinical significance of this deposition remains unknown, and no data for humans exist to show any adverse effects or harm at this time.     FDA Drug Safety Communication: FDA identifies no harmful effects to date with brain retention of gadolinium-based contrast agents for MRIs;   review to continue: https:// www.fda.gov/Drugs/DrugSafety/acp040002.htm    -Contact insurance company with regards to coverage of MRI breasts.   -Cannot undergo an MRI if  pregnant.   -MRI's may have false positives                 SRIDEVI (contrast enhanced mammogram):  SRIDEVI is the main alternative for anyone who benefits by but cannot have a breast MRI with IV contrast.    Main indications:   High risk screening   Suspicious clinical symptoms with inconclusive mammogram and ultrasound   New breast cancer, evaluate extent of disease   Pre and post marco-adjuvant systemic therapy evaluation     For high-risk screening, SRIDEVI replaces the regular non-contrast mammogram and any other supplemental test         For age over 75 years, screening recommendations are considered on an individual basis.       ACR guidelines:    Note: New American College of Radiology® (ACR®) breast cancer screening guidelines  now call for all women -- particularly Black and Ashkenazi Zoroastrianism women -- to have risk assessment by age 25 to determine if screening earlier than age 40 is needed. The ACR continues to recommend annual screening starting at age 40 for women of average risk, but earlier and more intensive screening for high-risk patients. The new ACR guidelines  for high-risk women were published online May 3 in the Journal of the American College of Radiology (JACR ).      Early detection decreases breast cancer death. The ACR recommends annual screening beginning at age 40 for women of average risk and earlier and/or more intensive screening for women at higher-than-average risk. For most women at higher-than-average risk, the supplemental screening method of choice is breast MRI. Women with genetics-based increased risk, those with a calculated lifetime risk of 20% or more, and those exposed to chest radiation at young ages are recommended to undergo MRI surveillance starting at ages 25 to 30 and annual mammography (with a variable starting age between 25 and 40, depending on the type of risk). Mutation carriers can delay mammographic screening until age 40 if annual screening breast MRI is performed as  recommended. Women diagnosed with breast cancer before age 50 or with personal histories of breast cancer and dense breasts should undergo annual supplemental breast MRI. Others with personal histories, and those with atypia at biopsy, should strongly consider MRI screening, especially if other risk factors are present. For women with dense breasts who desire supplemental screening, breast MRI is recommended. For those who qualify for but cannot undergo breast MRI, contrast-enhanced mammography or ultrasound could be considered. All women should undergo risk assessment by age 25, especially Black women and women of Ashkenazi Church heritage, so that those at higher-than-average risk can be identified and appropriate screening initiated.      -RECOMMENDED LIFESTYLE MODIFICATIONS FOR HIGH RISK PATIENTS:    * Reviewed Lifestyle modifications which have shown benefit:  Limit alcohol consumption to less than 1 drink per day (1 ounce liquor, 6 oz wine, 8 oz beer) and nor more than 3 drinks per week.   Avoid smoking.  Exercise at least 150 minutes per week of moderate intensity aerobic activity or at least 75 minutes of vigorous activity. Exercise can lower the relative risk of breast cancer by ~18-20%.  Maintain healthy weight and avoid post-menopausal weight gain. Avoid processed foods and eat more lean proteins, fruits and vegetables.                               * Available resources include genetic counseling, nutrition, weight management.    -requirements for Bariatric surgery. BMI must be >40 with no comorbidities or 35> with at least two comorbidities pertaining obesity (Htn, type 2 diabetes, Hesham, Osteoarthritis, and `  hyperlipidemia)          -CHEMOPREVENTION:                * For women at high risk for breast cancer, endocrine therapy can reduce the risk of invasive and/or in situ breast cancers. (tamoxifen for premenopausal or postmenopausal women and raloxifene or exemestane for postmenopausal  women).   -Above have been shown to lower the risk of breast cancer incidence, however there is no survival benefit in patients who don't have breast cancer.        Tamoxifen:    Data regarding tamoxifen risk reduction are limited to pre- and postmenopausal individuals >=35 years of age with a Magali Model 5-year breast cancer risk of >=1.7% or a 10-year risk by SIGIFREDO/Tyrer-Abezicke of >=5% or a history of LCIS.  Tamoxifen: 20 mg per day for 5 years was shown to reduce risk of breast cancer by 49%. Among individuals with a history of AH, this dose and duration of tamoxifen were associated with an 86% reduction in breast cancer risk. Low-dose tamoxifen (5 mg per day for 3-5 years)d is an option if patient is symptomatic on the 20-mg dose or if patient is unwilling or unable to take standard-dose tamoxifen.1 This low dosage needs further investigation in premenopausal individuals.  The efficacy of tamoxifen risk reduction in individuals who are carriers of BRCA1/2 and other pathogenic mutations is less well studied than in other risk groups. Limited data suggest there may be a benefit, likely a larger benefit, for BRCA2 carriers.  For healthy, premenopausal individuals at elevated risk for breast cancer, data regarding the risk/benefit ratio for tamoxifen appear relatively favorable (category 1).  For postmenopausal individuals at elevated risk for breast cancer, data regarding the risk/benefit ratio for tamoxifen are influenced by age, presence of uterus, or comorbid conditions (category 1). There are insufficient data on ethnicity and rac  -Risks of Tamoxifen side effects include hot flashes, invasive endometrial cancer in women > 49 years of age (2.3/1000 compared to 0.9/1000), cataracts, increased risk of pulmonary embolism among others.    Raloxifene:    Data regarding raloxifene risk reduction are limited to postmenopausal individuals >=35 years of age with a Magali Model 5-year breast cancer risk >=1.7% or a 10-year  risk by SIGIFREDO/Tyrer-Cuzicke of >=5% or a history of LCIS.  Raloxifene: 60 mg per day was found to be equivalent to tamoxifen for breast cancer risk reduction in the initial comparison. While raloxifene in long-term follow-up appears to be less efficacious in risk reduction than tamoxifen, consideration of toxicity may still lead to the choice of raloxifene over tamoxifen in individuals with an intact uterus.  There are no data regarding the use of raloxifene in individuals who are carriers of BRCA1/2 and other pathogenic mutations or who have had prior thoracic radiation.  For postmenopausal individuals at elevated risk for breast cancer, data regarding the risk/benefit ratio for raloxifene are influenced by age or comorbid conditions (category 1). There are insufficient data on ethnicity and race.  Use of raloxifene for breast cancer risk reduction in premenopausal individuals is inappropriate unless part of a clinical trial.     Aromatase Inhibitors (exemestane and anastrozole)   Data regarding exemestane are from a single large randomized study limited to postmenopausal individuals >=35 years of age with a Magali Model 5-year breast cancer risk >=1.7% or a 10-year risk by SIGIFREDO/Tyrer-Cuzicke of >=5% or a history of LCIS.  Data regarding anastrozole are from a single large randomized study limited to postmenopausal individuals 40 to 70 years of age with the following risk compared with the general population: Aged 40 to 44 years - 4 times higher Aged 45 to 60 years - >=2 times higher Aged 60 to 70 years - >=1.5 times higher Individuals who did not meet these criteria but had a Tyrer-Cuzicke model 10-year breast cancer risk >5% were also included.  Exemestane: 25 mg per day was found to reduce the relative incidence of invasive breast cancer by 65% from 0.55% to 0.19% with a median follow-up of 3 years.  Anastrozole: 1 mg per day was found to reduce the relative incidence of breast cancer by 53% with a median follow-up  of 5 years.  There are retrospective data that aromatase inhibitors can reduce the risk of contralateral breast cancer in BRCA1/2 patients with ER-positive breast cancer who take aromatase inhibitors as adjuvant agents.  For postmenopausal individuals at elevated risk for breast cancer, data regarding the risk/benefit ratio for aromatase inhibitor agents are influenced by age and comorbid conditions such as osteoporosis (category 1). There are insufficient data on ethnicity and race.  Use of aromatase inhibitors for breast cancer risk reduction in premenopausal individuals is inappropriate     Discussed ASK2Me           Assessment:     1. BRCA2 positive    2. At high risk for breast cancer    3. Family history of breast cancer    4. Extremely dense tissue of both breasts on mammography    5. Breast cancer screening, high risk patient    6. Preventative health care          Plan:     34 yo with BRCA 2 mutation and family history of breast cancer and ovarian cancer. The patient is unsure if she wants children in future or to breastfeed.   Above discussed with patient.   Plan for appropriate high risk screening for BRCA 2 mutation.   Refer to genetics for full genetic counseling.       Breast:   Refer to breast surgery to have discussion regarding RRM. Appt is scheduled for 10/2025  Opts out of chemoprevention.   CBE every 6-12 months  MRI breast alternating with MMG. She has dense breast so may do MRI first. If MRI too expensive, plan for SRIDEVI now. She will message me when she gets an estimate.   She is overdue for a breast exam but leaving town and will return 7/2025. I will message Dr. Clark to request a breast exam in effort to avoid multiple visits.   Encourage breast awareness, including monthly breast self-exams.   Recommend lifestyle modifications as above.     Eye:   Consider clinical eye exam annually.     Ovary:   She has an appt with GynOnc 7/8/2025 (Dr. Clark)  Consider oral contraceptives.   Ca125 and TVUS   over due. I will place the orders.   RRSO when ready.     Pancreas:   She has an appt with GI.   May need to refer to pancreatic clinic in future. Of note, she has no family history     Derm:   Referral placed.   No questionable areas of concern at this time.               Questions were encouraged and answered to patient's satisfaction, and patient verbalized understanding of information and agreement with the plan. Advised patient to RTC with any interval changes or concerns.      Route Chart for Scheduling    Med Onc Chart Routing  Urgent    Follow up with physician    Follow up with [facility] . See me in 6 months in [facility]   Infusion scheduling note    Injection scheduling note    Labs   Scheduling:  Preferred lab:  Lab interval:  Needs Ca125 when she comes for vaginal US   Imaging   Please schedule vaginal US now.   Pharmacy appointment    Other referrals            Addendum:   Judith Clark MD Alker, Peggy-Jo J., NP  Yes to all of those!  Thank you.          Previous Messages       ----- Message -----  From: Grisel Tobar NP  Sent: 5/21/2025   9:51 AM CDT  To: Judith Clark MD    Hi Dr. Clark!  Ms Henriquez is seeing you in July. Do you want me to order Ca125 and TVUS now so that you have the results?  Also, can you perform a breast exam in July when you see her. Im trying to avoid several appts for her:)  Thanks,  PJ         Patient is in agreement with the proposed treatment plan. All questions were answered to the patient's satisfaction. Pt knows to call clinic for any new or worsening symptoms and if anything is needed before the next clinic visit.    Grisel Tobar, MSN, APRN, FNP-C  Nurse Practitioner to Dr. Consuelo Benton  Lead [facility] for High-Risk Breast Clinic  Lead [facility] for Oncology Urgent Care  Hematology & Medical Oncology  [address] [place] [id number]  ph. [phone] ext [phone]  Fax. [phone]               code applied: patient requires or will require a continuous, longitudinal, and  active collaborative plan of care related to this patient's health condition, high risk cancer --the management of which requires the direction of a practitioner with specialized clinical knowledge, skill, and expertise.               [1]   Patient Active Problem List  Diagnosis    BRCA2 positive    Family history of breast cancer    STEPHANIE (generalized anxiety disorder)    Panic attacks    Depression    Moderate persistent asthma with acute exacerbation    Abnormal brain scan    Chronic nonintractable headache    Raynaud's phenomenon without gangrene    Bilateral occipital neuralgia   [2]   Current Outpatient Medications:     azelastine (ASTELIN) 137 mcg (0.1 %) nasal spray, 1 spray (137 mcg total) by Nasal route 2 (two) times daily., Disp: 30 mL, Rfl: 2    dicyclomine (BENTYL) 20 mg tablet, Take 1 tablet (20 mg total) by mouth 3 (three) times daily., Disp: 90 tablet, Rfl: 5    meclizine (ANTIVERT) 25 mg tablet, Take 1 tablet (25 mg total) by mouth 3 (three) times daily as needed for Dizziness. (Patient not taking: Reported on 5/15/2025), Disp: 30 tablet, Rfl: 1    mometasone (NASONEX) 50 mcg/actuation nasal spray, 2 sprays by Nasal route once daily., Disp: 17 g, Rfl: 2    ondansetron (ZOFRAN) 8 MG tablet, Take 1 tablet (8 mg total) by mouth every 12 (twelve) hours as needed for Nausea., Disp: 20 tablet, Rfl: 1    promethazine (PHENERGAN) 25 MG tablet, Take 1 tablet (25 mg total) by mouth every 4 (four) hours as needed for Nausea., Disp: 30 tablet, Rfl: 1    QUEtiapine (SEROQUEL) 50 MG tablet, Take 50 mg by mouth nightly., Disp: , Rfl:     sertraline (ZOLOFT) 100 MG tablet, Take 100 mg by mouth., Disp: , Rfl:     sertraline (ZOLOFT) 25 MG tablet, Take 25 mg by mouth once daily., Disp: , Rfl:     ZOLOFT 50 mg tablet, Take 50 mg by mouth every evening., Disp: , Rfl:

## 2025-05-20 NOTE — Clinical Note
David Clark!  Ms Henriquez is seeing you in July. Do you want me to order Ca125 and TVUS now so that you have the results?  Also, can you perform a breast exam in July when you see her. Im trying to avoid several appts for her:)  ThanksNISHA

## 2025-05-21 ENCOUNTER — TELEPHONE (OUTPATIENT)
Dept: HEMATOLOGY/ONCOLOGY | Facility: CLINIC | Age: 35
End: 2025-05-21
Payer: COMMERCIAL

## 2025-05-21 ENCOUNTER — PATIENT MESSAGE (OUTPATIENT)
Dept: HEMATOLOGY/ONCOLOGY | Facility: CLINIC | Age: 35
End: 2025-05-21
Payer: COMMERCIAL

## 2025-06-06 NOTE — LETTER
January 30, 2020      Amado Dillon MD  [address]  [address]  [facility]  [facility] [address]           [facility] [address]  [address], [address]  [facility] [address]  Phone: [phone]  Fax: [phone]          Patient: Haley Henriquez   MR Number: [id number]   YOB: 1990   Date of Visit: 1/15/2020       Dear Dr. Amado Dillon:    Thank you for referring Haley Henriquez to me for evaluation. Attached you will find relevant portions of my assessment and plan of care.    If you have questions, please do not hesitate to call me. I look forward to following Haley Henriquez along with you.    Sincerely,    Mary Kate Nation  CC:  No Recipients    If you would like to receive this communication electronically, please contact [email or web address] or [phone] to request more information on [employer] access.    For providers and/or their staff who would like to refer a patient to [facility], please contact us through our one-stop-shop provider referral line, [facility], at [phone].    If you feel you have received this communication in error or would no longer like to receive these types of communications, please e-mail [email or web address]          [phone]

## 2025-06-16 ENCOUNTER — PATIENT MESSAGE (OUTPATIENT)
Dept: HEMATOLOGY/ONCOLOGY | Facility: CLINIC | Age: 35
End: 2025-06-16
Payer: COMMERCIAL

## 2025-06-17 ENCOUNTER — TELEPHONE (OUTPATIENT)
Dept: HEMATOLOGY/ONCOLOGY | Facility: CLINIC | Age: 35
End: 2025-06-17
Payer: COMMERCIAL

## 2025-06-17 NOTE — TELEPHONE ENCOUNTER
Called and spoke to patient to remind of 3 pm virtual visit on 6/18/25 and to complete the questionnaire.

## 2025-06-18 ENCOUNTER — PATIENT MESSAGE (OUTPATIENT)
Dept: HEMATOLOGY/ONCOLOGY | Facility: CLINIC | Age: 35
End: 2025-06-18
Payer: COMMERCIAL

## 2025-06-18 ENCOUNTER — OFFICE VISIT (OUTPATIENT)
Dept: HEMATOLOGY/ONCOLOGY | Facility: CLINIC | Age: 35
End: 2025-06-18
Payer: COMMERCIAL

## 2025-06-18 DIAGNOSIS — Z80.3 FAMILY HISTORY OF BREAST CANCER: ICD-10-CM

## 2025-06-18 DIAGNOSIS — Z15.01 BRCA2 POSITIVE: ICD-10-CM

## 2025-06-18 DIAGNOSIS — Z15.09 BRCA2 POSITIVE: ICD-10-CM

## 2025-06-18 DIAGNOSIS — Z80.41 FAMILY HISTORY OF OVARIAN CANCER: ICD-10-CM

## 2025-06-18 DIAGNOSIS — Z71.83 ENCOUNTER FOR NONPROCREATIVE GENETIC COUNSELING: Primary | ICD-10-CM

## 2025-06-18 NOTE — LETTER
BRCA2 Risks and recommendations    Breast cancer (Es 58.9%):   FH early onset and bilateral breast cancer in maternal great aunts.   CBE every 6, once in the high risk clinic and once by gynecology   Imaging with breast MRI/mmg every 6-12 months.   Appointment in 10/2025 with breast surgery to discuss RRM.   Follow up with NISHA Tobar NP in the high risk breast clinic  Ovarian cancer (13-30%)  FH of ovarian cancer in her MGM, but her MGF is suspected to be the carrier of the BRCA2 variant, given the early-onset and bilateral breast cancers in his sisters.   Consider RRSO at age 35-40 upon completion of childbirth.  TVUS and  can be considered, though of uncertain benefit.   Follow up with Dr. 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 Clark 7/2025  - Consult with Dr. Wong 9/2025  - Annual well woman exam   - Colonoscopy at age 45  - Referral to the high risk pancreas clinic ([facility]) at age 50  - Cancer risk reduction strategies            See next pages for more details.                    Germline BRCA2 Gene Mutations  Hereditary Breast and Ovarian Cancer syndrome (HBOC)    Cancer Risk for  General Population Risk for  BRCA2 Carriers Age BRCA2 carriers begin screening   Female breast 12% 55-70% 25   Ovarian 1% 13-30% 30-35   Prostate 12% 20-60% 40   Male breast <1% 2-7% 35   Pancreatic  1% 5-10% 50 or 10 years before the relative's age at dx   Melanoma, skin 1.5% 2.5% Any age   Melanoma, eye <0.01% 0.2% Any age     SCREENING/MANAGEMENT GUIDELINES    Female breast cancer:   BRCA2 carriers have  an increased risk for triple negative breast cancer (TNBC), which is more aggressive than estrogen receptor-positive breast cancer.   Risk management recommendations:   Breast self-exams (BSEs) starting at age 18, promptly reporting any changes or concerns to the healthcare provider.   Referral to a high-risk breast specialist.   Clinical breast exams (CBEs) every 6 months, starting at age 25.   Annual breast MRI with contrast, starting at age 25 or younger if a breast cancer diagnosis before age 30 is present in the family.   Annual mammogram with tomosynthesis, starting at age 30.   Discuss the option of risk-reducing mastectomy (RRM).  RRM significantly reduces, but does not eliminate, the chance of developing breast cancer.    Consider chemoprevention for breast cancer risk reduction.    Ovarian, fallopian tube, primary peritoneal cancer:   Referral to a high-risk ovarian cancer specialist (gynecology oncologist).  Bilateral risk-reducing salpingectomy-oophorectomy (RRSO), typically between ages 35 and 40 and upon completion of childbearing.  RRSO significantly reduces, but does not eliminate, the chance of developing a new ovarian cancer.  Of note, females who undergo hysterectomy with RRSO are candidates for estrogen-alone hormone replacement therapy (HRT), which is associated with less risk of breast cancer as compared to the combined estrogen-and-progesterone HRT that is required if HRT is given when the uterine remains in situ.  Also of note, in premenopausal females, oophorectomy likely reduces the risk of breast cancer.    Prior to RRSO, or if the patient has opted out of RRSO, transvaginal ultrasound (TVUS) combined with serum CA-125 can be considered, though of uncertain benefit.  This would begin at age 30-35.    Consider chemoprevention for ovarian cancer risk reduction.      Prostate cancer:   BRCA2 carriers are more likely to be diagnosed with prostate cancer before age 65 and have a more aggressive  form of the cancer.   Annual prostate cancer screening, starting at age 40, and consisting of serum prostate-specific antigen (PSA) with strong consideration for digital rectal examination (VEENA).       Male breast cancer:   Breast self-exams (BSEs) starting at age 35, promptly reporting any changes or concerns to the healthcare provider.    Referral to a high-risk breast specialist.   Clinical breast exams (CBEs) every 12 months, starting at age 35.    Consider annual mammogram in men with gynecomastia.  This would start at age 50 or at 10 years younger than the earliest known diagnosis of male breast cancer in the family, whichever is younger.      Exocrine pancreatic cancer:   Pancreatic cancer screening consist of alternating annual contrast-enhanced MRI/magnetic resonance cholangiopancreatography (MRCP) and endoscopic ultrasound (EUS).   Screening begins age 50 or at 10 years younger than the earliest known diagnosis of pancreatic cancer in the family, whichever comes first.   Risk reduction includes diet and exercise to maintain a healthy weight, smoking cessation, and tight glycemic control.      Melanoma of the skin:   Annual total-body skin examination by a dermatologist.   Minimize exposure to ultraviolet (UV) radiation emitted by the sun and tanning beds. Wear hats with wide brims and wraparound sunglasses with both UVA and UVB protection. Stay in the shade, especially during midday. Wear clothing that covers your arms and legs. Use sunscreen that is SPF15 or higher and has both UVA and UVB protection.     Melanoma of the eye:   Annual eye examination by an ophthalmologist or optometrist.    Minimize exposure to ultraviolet (UV) radiation emitted by the sun and tanning beds. Wear hats with wide brims and wraparound sunglasses with both UVA and UVB protection.     Serous uterine carcinoma:  There is limited evidence that the lifetime risk may be 2% compared to 1% in the general population.     Fanconi  anemia:   Fanconi is a rare autosomal recessive condition that can only occur if the individual inherits a BRCA2 mutation from both parents. If both parents have a BRCA2 mutation, each child has a 50% chance of inheriting one mutation and a 25% chance of inheriting both.   Fanconi anemia is apparent in early childhood and is characterized by:   Developmental abnormalities in major organ systems.  Early-onset bone marrow failure commonly presenting by age 10.   High risk for developing cancers, including acute leukemia and solid tumors, by age 6.  Because this is a severe and often fatal condition, anyone who has or may have a BRCA2 mutation should see a genetic counselor for pre-conception genetic counseling and carrier screening. If both parents are found to have a BRCA2 mutation, they have the option of having a child using IVF and pre-implantation genetic diagnosis, which is where they test each embryo and only implant those that do not have the double BRCA2 mutation.     Implications for relatives:   Germline mutations are passed directly from parent to child, with each child having a 50% chance of inheriting the mutation.  Therefore, the affected individuals parents, siblings, and children each have a 50% chance of also having the mutation and need genetic testing.  Once it is determined which parent the mutation came from, individuals on that side of the family should be notified of the mutation so they can pursue testing. Cascade testing is advised, which means the parent is tested first. Mutations do not skip generations, so if a parent tests negative, their children cannot have the mutation and do not need testing for it.

## 2025-06-18 NOTE — PROGRESS NOTES
Hereditary/High Risk Clinic  Department of Hematology and Oncology  [facility]    Cancer Genetics  Initial Consultation    Date of Service:  25  Visit Provider:  Ofelia Booker PA-C  Collaborating Physician:  Consuelo Benton MD    Patient:  Haley Henriuqez  :  1990  MRN:   [id number]     Referring Provider    Grisel Tobar, NP  [address] [place] [address]      Televisit Information:   The patient location is: [place]  The chief complaint leading to consultation is: Cancer genetic assessment    Visit type: Audiovisual    Face to Face time with patient: 60  90 minutes of total time spent on the encounter, which includes face to face time and non-face to face time preparing to see the patient (eg, review of tests), Obtaining and/or reviewing separately obtained history, Documenting clinical information in the electronic or other health record, Independently interpreting results (not separately reported) and communicating results to the patient/family/caregiver, or Care coordination (not separately reported).     Each patient to whom I provide medical services by telemedicine is:  (1) informed of the relationship between the physician and patient and the respective role of any other health care provider with respect to management of the patient; and (2) notified that he or she may decline to receive medical services by telemedicine and may withdraw from such care at any time.    SUBJECTIVE   Chief Complaint: Cancer genetic risk assessment    HPI:  Haley Henriquez is a 35 y.o. assigned female at birth who is established with the [facility] but new to me. She tested positive for a BRCA2 mutation in  and was referred to genetics to review updated guidelines for BRCA2.     Focused personal history:   Cancer: No  Other tumor or pertinent mass/lesion: No  Blood disorder:  No  Pancreatitis:  No  Ashkenazi Caodaism:  No  Germline  "cancer-genetic testing:   ProPlan single site analysis, resulted 2013, report is in Media.  BRCA2 [id number], aka [id number] ([id number]), heterozygous, pathogenic    Cancer Screening:   Followed in a high risk clinic:   High risk breast clinic: Lynn Tobar NP, initial visit 2025  High risk ovarian clinic: Dr. Judith Clark, last seen 3/2023. Plan was for TVUS, MRI Breast, , RTC 6 months. Tests were done and were normal, but she did not return to see Dr. Clark in 2023.   Colonoscopy: None  Mammogram: 10/2022 negative, ED, TC 61.17%  Breast MRI: 2024 negative, ED, TC 61.17%  TVUS: 2024 negative  : 3/2023, normal  Well woman exam: 2024, pelvic exam only. Last clinical breast exam by gynecology appears to have been in 2021.    Breast-Specific History  Age: 35 years  Height:  5' 6"  Weight:  130 pounds  Breast density per BI-RADS: D - Extremely dense in   Age at menarche: 13 years  Age at first live birth:  No births  History of breast feedin months  Uterus and ovaries intact: yes  Menopause: Premenopausal  HRT usage:  never  History of XRT to chest wall between the ages of 10-30 years: No  Personal history of breast biopsy:  No    Family History  Consanguinity: No  Hereditary cancer genetic testing in blood relatives: Yes        If this pedigree appears small/illegible on your screen, expand this note window horizontally. A copy of the pedigree is also available in Media.     Past Medical History:   Diagnosis Date    Allergy     Anxiety     Asthma     BRCA2 positive 2013    Depression     Panic disorder      Social History     Tobacco Use    Smoking status: Never    Smokeless tobacco: Never   Substance Use Topics    Alcohol use: Yes     Comment: socially     Review of Systems  See HPI.      OBJECTIVE   Physical Exam   Limited secondary to the inherent nature of a virtual visit.  Very pleasant patient.  Constitutional: No apparent distress.   Neurological: Alert and " oriented.   Psychiatric: Normal mood, affect, thought content, speech, behavior, judgment.    COUNSELING   Germline BRCA2 Gene Mutations  Hereditary Breast and Ovarian Cancer syndrome (HBOC)    Cancer Risk for  General Population Risk for  BRCA2 Carriers Age BRCA2 carriers begin screening   Female breast 12% 55-70% 25   Ovarian 1% 13-30% 30-35   Prostate 12% 20-60% 40   Male breast <1% 2-7% 35   Pancreatic  1% 5-10% 50 or 10 years before the relative's age at dx   Melanoma, skin 1.5% 2.5% Any age   Melanoma, eye <0.01% 0.2% Any age     SCREENING/MANAGEMENT GUIDELINES    Female breast cancer:   BRCA2 carriers have an increased risk for triple negative breast cancer (TNBC), which is more aggressive than estrogen receptor-positive breast cancer.   Risk management recommendations:   Breast self-exams (BSEs) starting at age 18, promptly reporting any changes or concerns to the healthcare provider.   Referral to a high-risk breast specialist.   Clinical breast exams (CBEs) every 6 months, starting at age 25.   Annual breast MRI with contrast, starting at age 25 or younger if a breast cancer diagnosis before age 30 is present in the family.   Annual mammogram with tomosynthesis, starting at age 30.   Discuss the option of risk-reducing mastectomy (RRM).  RRM significantly reduces, but does not eliminate, the chance of developing breast cancer.    Consider chemoprevention for breast cancer risk reduction.    Ovarian, fallopian tube, primary peritoneal cancer:   Referral to a high-risk ovarian cancer specialist (gynecology oncologist).  Bilateral risk-reducing salpingectomy-oophorectomy (RRSO), typically between ages 35 and 40 and upon completion of childbearing.  RRSO significantly reduces, but does not eliminate, the chance of developing a new ovarian cancer.  Of note, females who undergo hysterectomy with RRSO are candidates for estrogen-alone hormone replacement therapy (HRT), which is associated with less risk of breast  cancer as compared to the combined estrogen-and-progesterone HRT that is required if HRT is given when the uterine remains in situ.  Also of note, in premenopausal females, oophorectomy likely reduces the risk of breast cancer.    Prior to RRSO, or if the patient has opted out of RRSO, transvaginal ultrasound (TVUS) combined with serum CA-125 can be considered, though of uncertain benefit.  This would begin at age 30-35.    Consider chemoprevention for ovarian cancer risk reduction.      Prostate cancer:   BRCA2 carriers are more likely to be diagnosed with prostate cancer before age 65 and have a more aggressive form of the cancer.   Annual prostate cancer screening, starting at age 40, and consisting of serum prostate-specific antigen (PSA) with strong consideration for digital rectal examination (VEENA).       Male breast cancer:   Breast self-exams (BSEs) starting at age 35, promptly reporting any changes or concerns to the healthcare provider.    Referral to a high-risk breast specialist.   Clinical breast exams (CBEs) every 12 months, starting at age 35.    Consider annual mammogram in men with gynecomastia.  This would start at age 50 or at 10 years younger than the earliest known diagnosis of male breast cancer in the family, whichever is younger.      Exocrine pancreatic cancer:   Pancreatic cancer screening consist of alternating annual contrast-enhanced MRI/magnetic resonance cholangiopancreatography (MRCP) and endoscopic ultrasound (EUS).   Screening begins age 50 or at 10 years younger than the earliest known diagnosis of pancreatic cancer in the family, whichever comes first.   Risk reduction includes diet and exercise to maintain a healthy weight, smoking cessation, and tight glycemic control.      Melanoma of the skin:   Annual total-body skin examination by a dermatologist.   Minimize exposure to ultraviolet (UV) radiation emitted by the sun and tanning beds. Wear hats with wide brims and wraparound  sunglasses with both UVA and UVB protection. Stay in the shade, especially during midday. Wear clothing that covers your arms and legs. Use sunscreen that is SPF15 or higher and has both UVA and UVB protection.     Melanoma of the eye:   Annual eye examination by an ophthalmologist or optometrist.    Minimize exposure to ultraviolet (UV) radiation emitted by the sun and tanning beds. Wear hats with wide brims and wraparound sunglasses with both UVA and UVB protection.     Serous uterine carcinoma:  There is limited evidence that the lifetime risk may be 2% compared to 1% in the general population.     Fanconi anemia:   Fanconi is a rare autosomal recessive condition that can only occur if the individual inherits a BRCA2 mutation from both parents. If both parents have a BRCA2 mutation, each child has a 50% chance of inheriting one mutation and a 25% chance of inheriting both.   Fanconi anemia is apparent in early childhood and is characterized by:   Developmental abnormalities in major organ systems.  Early-onset bone marrow failure commonly presenting by age 10.   High risk for developing cancers, including acute leukemia and solid tumors, by age 6.  Because this is a severe and often fatal condition, anyone who has or may have a BRCA2 mutation should see a genetic counselor for pre-conception genetic counseling and carrier screening. If both parents are found to have a BRCA2 mutation, they have the option of having a child using IVF and pre-implantation genetic diagnosis, which is where they test each embryo and only implant those that do not have the double BRCA2 mutation.     Implications for relatives:   Germline mutations are passed directly from parent to child, with each child having a 50% chance of inheriting the mutation.  Therefore, the affected individuals parents, siblings, and children each have a 50% chance of also having the mutation and need genetic testing.  Once it is determined which parent the  mutation came from, individuals on that side of the family should be notified of the mutation so they can pursue testing. Cascade testing is advised, which means the parent is tested first. Mutations do not skip generations, so if a parent tests negative, their children cannot have the mutation and do not need testing for it.     Cancer risk reduction strategies  Most cancers are caused by a combination of aging, environmental exposures (chemicals, radiation), lifestyle factors (alcohol, smoking, obesity, sedentary lifestyle, poor diet), and medical conditions that cause infection or chronic inflammation in the body (diabetes, fatty liver disease, and viruses like HPV).     Don't smoke. Avoid second-hand smoke.   Limit alcohol consumption.   Exercise at least 150 minutes per week.   Maintain a healthy weight.   Eat a healthy diet.   Plant-based diets like the Mediterranean diet have been proven to reduce the risk for cancer and other medical conditions and help you live a longer, healthier life.   Eat more vegetables, fruits, nuts, beans, and whole grains. Healthy proteins include eggs, poultry, and seafood.   Limit red meat to no more than three portions per week.   Avoid processed foods. These foods can contain artificial preservatives and may be high in salt, fat, and sugar. This includes sugary drinks, processed meats (deli meat, hot dogs, sausages), frozen pizza/meals, packaged baked goods (cookies, crackers, chips), most breakfast cereals, canned/instant soup, boxed instant pasta products, and sweetened yogurt.   Take steps to control or resolve any chronic medical conditions. For example, diabetes and viral infections (HPV, Monica-Barr/mononucleosis, hepatitis B and C) are risk factors for cancer.   Protect against sexually transmitted infections.   Avoid UV exposure from the sun and tanning beds.   Wear all recommended protective gear in the workplace and report any safety concerns.   Engage in all  recommended cancer screenings.      ASSESSMENT/PLAN   Haley is a 35-year-old with no personal history of cancer who underwent single site analysis in 2013 that was positive for the familial BRCA2 mutation. We reviewed her cancer risks and current screening and management guidelines for BRCA2.    Breast cancer (Tyrer-Cuzick 58.9%):   FH early onset and bilateral breast cancer in maternal great aunts.   CBE every 6, once in the high risk clinic and once by gynecology   Imaging with breast MRI/mmg every 6-12 months.   Appointment in 10/2025 with breast surgery to discuss RRM.   Follow up with NISHA Tobar NP in the high risk breast clinic  Ovarian cancer (13-30%)  FH of ovarian cancer in her MGM, but her MGF is suspected to be the carrier of the BRCA2 variant, given the early-onset and bilateral breast cancers in his sisters.   Consider RRSO at age 35-40 upon completion of childbirth.  TVUS and  can be considered, though of uncertain benefit.   Follow up with Dr. 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 Wong in 9/2025  Uveal melanoma (0.2%)  Annual eye exam by an optometrist or ophthalmologist  Haley sees an optometrist annually        1. Encounter for nonprocreative genetic counseling    2. BRCA2 positive  - Follow up with NISHA Tobar NP  - Follow up with Dr. Clark 7/2025  - Consult with Dr. Wong 9/2025  - Annual well woman exam   - Referral to the high risk pancreas clinic ([facility]) at age 50  - Cancer risk reduction strategies    3. Family history of breast cancer    4. Family history of ovarian cancer      - Follow up for further discussion if indicated or patient desires.      Questions were encouraged and answered to the patient's satisfaction, and she verbalized understanding of information  and agreement with the plan.       REFERENCES     ANDREAS Marin, & DENIA Harvey. (06 October 2021). Cancer risks and management of BRCA1/2 carriers without cancer. In DENIA Harrell, KENNETH Wheeler, & MOE Rhodes (Eds.), UpToDate.  National Comprehensive Cancer Network (NCCN). (2021). Genetic/familial high-risk assessment: Breast, ovarian, and pancreatic. NCCN Clinical Practice Guidelines in Oncology (NCCN Guidelines), Version 2.2025.  National Comprehensive Cancer Network (NCCN). (2021). Prostate cancer early detection. NCCN Clinical Practice Guidelines in Oncology (NCCN Guidelines), Version 2.2021.  American Cancer Society, Inc. (ACS). (12 January 2021). Key Statistics for Prostate Cancer. Retrieved on November 23, 2021, from https://www.cancer.org/cancer/prostate-cancer/about/key-statistics.html  American Cancer Society, Inc. (ACS). (12 January 2022). Key Statistics for Ovarian Cancer. Retrieved on March 2, 2022, from https://www.cancer.org/cancer/ovarian-cancer/about/key-statistics.html  American Cancer Society, Inc. (ACS). (12 January 2022). Key Statistics for Melanoma Skin Cancer. Retrieved on March 2, 2022, from https://www.cancer.org/cancer/melanoma-skin-cancer/about/key-statistics.html       TREVOR Saba PA-C  Physician Assistant, Hereditary & High Risk Clinic  Hematology Oncology, [facility]

## 2025-06-27 ENCOUNTER — PATIENT MESSAGE (OUTPATIENT)
Dept: HEMATOLOGY/ONCOLOGY | Facility: CLINIC | Age: 35
End: 2025-06-27
Payer: COMMERCIAL

## 2025-06-27 ENCOUNTER — HOSPITAL ENCOUNTER (OUTPATIENT)
Dept: RADIOLOGY | Facility: OTHER | Age: 35
Discharge: HOME | End: 2025-06-27
Attending: NURSE PRACTITIONER
Payer: COMMERCIAL

## 2025-06-27 DIAGNOSIS — Z15.09 BRCA2 POSITIVE: Primary | ICD-10-CM

## 2025-06-27 DIAGNOSIS — Z15.01 BRCA2 POSITIVE: Primary | ICD-10-CM

## 2025-06-27 DIAGNOSIS — Z15.09 BRCA2 POSITIVE: ICD-10-CM

## 2025-06-27 DIAGNOSIS — Z15.01 BRCA2 POSITIVE: ICD-10-CM

## 2025-06-27 PROCEDURE — 76830 TRANSVAGINAL US NON-OB: CPT | Mod: TC

## 2025-07-08 ENCOUNTER — LAB VISIT (OUTPATIENT)
Dept: LAB | Facility: HOSPITAL | Age: 35
End: 2025-07-08
Payer: COMMERCIAL

## 2025-07-08 ENCOUNTER — OFFICE VISIT (OUTPATIENT)
Dept: GYNECOLOGIC ONCOLOGY | Facility: CLINIC | Age: 35
End: 2025-07-08
Payer: COMMERCIAL

## 2025-07-08 VITALS
HEART RATE: 65 BPM | TEMPERATURE: 98 F | BODY MASS INDEX: 20.41 KG/M2 | SYSTOLIC BLOOD PRESSURE: 112 MMHG | OXYGEN SATURATION: 99 % | RESPIRATION RATE: 18 BRPM | WEIGHT: 127 LBS | HEIGHT: 66 IN | DIASTOLIC BLOOD PRESSURE: 69 MMHG

## 2025-07-08 DIAGNOSIS — N83.209 CYST OF OVARY, UNSPECIFIED LATERALITY: ICD-10-CM

## 2025-07-08 DIAGNOSIS — Z15.01 BRCA2 POSITIVE: ICD-10-CM

## 2025-07-08 DIAGNOSIS — Z15.01 BRCA2 POSITIVE: Primary | ICD-10-CM

## 2025-07-08 DIAGNOSIS — Z15.09 BRCA2 POSITIVE: ICD-10-CM

## 2025-07-08 DIAGNOSIS — Z15.09 BRCA2 POSITIVE: Primary | ICD-10-CM

## 2025-07-08 LAB — CANCER AG125 SERPL-ACNC: 13 UNIT/ML

## 2025-07-08 PROCEDURE — 86304 IMMUNOASSAY TUMOR CA 125: CPT

## 2025-07-08 PROCEDURE — 99999 PR PBB SHADOW E&M-EST. PATIENT-LVL IV: CPT | Mod: PBBFAC,,, | Performed by: OBSTETRICS & GYNECOLOGY

## 2025-07-08 PROCEDURE — 36415 COLL VENOUS BLD VENIPUNCTURE: CPT

## 2025-07-08 NOTE — PROGRESS NOTES
Subjective     Patient ID: Haley Henriquez is a 35 y.o. female.    Chief Complaint: Follow-up (Last seen 2023)    HPI    Presents today for follow up BRCA2.   Has a Kyleena IUD placed 5/2024 and copper IUD removed     6/2025 Pelvic US  Right ovarian cyst with homogeneous low level internal echoes consistent with either hemorrhagic cyst or possibly endometrioma.  Recommend follow-up in 6-12 weeks to ensure stability or resolution.  Intrauterine device in good position in the endometrial canal.     pending     Last breast imaging MRI 1/2024.  Follows with SONYA Tobar     Pap/HPV 5/2022 negative.     Endorses chronic pelvic pain since our last visit.      ______________________________    Referral history:  Referred by Dr. Dillon for BRCA2 mutation and ovarian cancer risk reduction management.      Recently moved from [facility]. She was tested for BRCA after a positive test in her family in 2013. She has 1 aunt with breast cancer and mother with ovarian cancer.  Also a patient of Dr Oliva. P0 and desires fertility. Currently on OCPs for contraception.       12/2019 pap and HPV negative.     9/2022   Pelvic US unremarkable   14     3/2023  Pelvic US unremarkable     12    Review of Systems   Constitutional:  Negative for appetite change, chills, diaphoresis, fatigue, fever and unexpected weight change.   Respiratory:  Negative for chest tightness, shortness of breath and wheezing.    Cardiovascular:  Negative for chest pain, palpitations and leg swelling.   Gastrointestinal:  Negative for abdominal pain, constipation, diarrhea, nausea and vomiting.   Genitourinary:  Positive for pelvic pain. Negative for difficulty urinating, dysuria, flank pain, frequency, hematuria, urgency, vaginal bleeding, vaginal discharge and vaginal pain.   Musculoskeletal:  Negative for back pain.   Integumentary:  Negative for color change.   Neurological:  Negative for dizziness, light-headedness, numbness and headaches.  "  Psychiatric/Behavioral:  Negative for agitation. The patient is not nervous/anxious.      /69 (BP Location: Left arm, Patient Position: Sitting)   Pulse 65   Temp 97.8 °F (36.6 °C) (Oral)   Resp 18   Ht 5' 6" (1.676 m)   Wt 57.6 kg (127 lb)   LMP 06/29/2025   SpO2 99%   BMI 20.50 kg/m²        Objective     Physical Exam  Vitals and nursing note reviewed. Exam conducted with a chaperone present.   Constitutional:       Appearance: Normal appearance. She is not ill-appearing.   HENT:      Head: Normocephalic and atraumatic.   Eyes:      General: No scleral icterus.  Pulmonary:      Effort: No respiratory distress.      Breath sounds: Normal breath sounds.   Chest:   Breasts:     Breasts are symmetrical.      Right: Normal. No mass or skin change.      Left: Normal. No mass or skin change.          Comments: Normal breast exam. Fibrocystic changes bilaterally, most palpably prominent bilateral lower inner quadrants  Abdominal:      General: There is no distension.      Palpations: Abdomen is soft. There is no mass.      Tenderness: There is no abdominal tenderness.      Hernia: No hernia is present.   Genitourinary:     Exam position: Lithotomy position.      Labia:         Right: No lesion.         Left: No lesion.       Vagina: Normal. No bleeding.      Cervix: Normal.      Uterus: Normal.       Adnexa: Right adnexa normal and left adnexa normal.      Comments: Mobile pelvic structures. No discretely palpable masses. IUD strings visible.  A female staff member was present to chaperone during the pelvic exam.       Musculoskeletal:         General: Normal range of motion.      Cervical back: Normal range of motion.      Right lower leg: No edema.      Left lower leg: No edema.   Skin:     General: Skin is warm and dry.      Coloration: Skin is not pale.      Findings: No erythema.   Neurological:      General: No focal deficit present.      Mental Status: She is alert and oriented to person, place, and " time.   Psychiatric:         Mood and Affect: Mood normal.         Behavior: Behavior normal.            Assessment and Plan     1. BRCA2 positive  Overview:  Breast cancer risk (59%%): Followed by NISHA Tobar NP (hereditary & high risk clinic)  Ovarian cancer risk (13-30%): Followed by Dr. Clark (gyn onc)  Pancreatic cancer risk (5-10%): Refer to high risk pancreas clinic ([facility]) for screening at age 50  Cutaneous melanoma risk (2.5%): Consult with Dr. Wong 9/2025  Uveal melanoma risk (0.2%): Annual eye exam by an optometrist    Orders:  -     US Pelvis Comp with Transvag NON-OB (xpd; Future; Expected date: 01/08/2026    2. Cyst of ovary, unspecified laterality  -     US Pelvis Comp with Transvag NON-OB (xpd; Future; Expected date: 01/08/2026        No obvious evidence of disease on today's exam  F/u  today  Short interval follow up pelvic ultrasound for noted likely physiologic hemorrhagic cyst.-- VV to follow to review results.   Chronic pelvic pain, may consider changing back to OCPs for cyst formation suppression.   Recommend continued follow up until ready for risk reducing surgery.   RTC 6 months or sooner if needed.      I spent approximately 30 minutes reviewing the available records and evaluating the patient, out of which over 50% of the time was spent face to face with the patient in counseling and coordinating this patient's care.            No follow-ups on file.

## 2025-07-09 ENCOUNTER — PATIENT MESSAGE (OUTPATIENT)
Dept: GYNECOLOGIC ONCOLOGY | Facility: CLINIC | Age: 35
End: 2025-07-09
Payer: COMMERCIAL

## 2025-07-11 ENCOUNTER — TELEPHONE (OUTPATIENT)
Dept: GYNECOLOGIC ONCOLOGY | Facility: CLINIC | Age: 35
End: 2025-07-11
Payer: COMMERCIAL

## 2025-07-14 ENCOUNTER — LAB VISIT (OUTPATIENT)
Dept: LAB | Facility: OTHER | Age: 35
End: 2025-07-14
Attending: FAMILY MEDICINE
Payer: COMMERCIAL

## 2025-07-14 ENCOUNTER — OFFICE VISIT (OUTPATIENT)
Dept: INTERNAL MEDICINE | Facility: CLINIC | Age: 35
End: 2025-07-14
Attending: FAMILY MEDICINE
Payer: COMMERCIAL

## 2025-07-14 VITALS
OXYGEN SATURATION: 97 % | WEIGHT: 129.06 LBS | DIASTOLIC BLOOD PRESSURE: 64 MMHG | HEIGHT: 66 IN | BODY MASS INDEX: 20.74 KG/M2 | SYSTOLIC BLOOD PRESSURE: 100 MMHG | HEART RATE: 68 BPM

## 2025-07-14 DIAGNOSIS — Z00.00 PREVENTATIVE HEALTH CARE: Primary | ICD-10-CM

## 2025-07-14 DIAGNOSIS — Z00.00 PREVENTATIVE HEALTH CARE: ICD-10-CM

## 2025-07-14 LAB
ALBUMIN SERPL BCP-MCNC: 4.5 G/DL (ref 3.5–5.2)
ALP SERPL-CCNC: 42 UNIT/L (ref 40–150)
ALT SERPL W/O P-5'-P-CCNC: 8 UNIT/L (ref 10–44)
ANION GAP (OHS): 11 MMOL/L (ref 8–16)
AST SERPL-CCNC: 14 UNIT/L (ref 11–45)
BILIRUB SERPL-MCNC: 0.5 MG/DL (ref 0.1–1)
BUN SERPL-MCNC: 11 MG/DL (ref 6–20)
CALCIUM SERPL-MCNC: 9.3 MG/DL (ref 8.7–10.5)
CHLORIDE SERPL-SCNC: 103 MMOL/L (ref 95–110)
CO2 SERPL-SCNC: 26 MMOL/L (ref 23–29)
CREAT SERPL-MCNC: 0.8 MG/DL (ref 0.5–1.4)
GFR SERPLBLD CREATININE-BSD FMLA CKD-EPI: >60 ML/MIN/1.73/M2
GLUCOSE SERPL-MCNC: 82 MG/DL (ref 70–110)
POTASSIUM SERPL-SCNC: 4 MMOL/L (ref 3.5–5.1)
PROT SERPL-MCNC: 7.9 GM/DL (ref 6–8.4)
SODIUM SERPL-SCNC: 140 MMOL/L (ref 136–145)
TSH SERPL-ACNC: 1.21 UIU/ML (ref 0.4–4)

## 2025-07-14 PROCEDURE — 84443 ASSAY THYROID STIM HORMONE: CPT

## 2025-07-14 PROCEDURE — 83036 HEMOGLOBIN GLYCOSYLATED A1C: CPT

## 2025-07-14 PROCEDURE — 80061 LIPID PANEL: CPT

## 2025-07-14 PROCEDURE — 99999 PR PBB SHADOW E&M-EST. PATIENT-LVL III: CPT | Mod: PBBFAC,,, | Performed by: FAMILY MEDICINE

## 2025-07-14 PROCEDURE — 80053 COMPREHEN METABOLIC PANEL: CPT

## 2025-07-14 PROCEDURE — 36415 COLL VENOUS BLD VENIPUNCTURE: CPT

## 2025-07-14 PROCEDURE — 99395 PREV VISIT EST AGE 18-39: CPT | Mod: S$GLB,,, | Performed by: FAMILY MEDICINE

## 2025-07-14 NOTE — PROGRESS NOTES
"CHIEF COMPLAINT: Annual    HISTORY OF PRESENT ILLNESS: The patient is a generally healthy 35 year-old.  The patient has no specific complaints today.  It has been a while since the patient has been seen.  The patient would also like to get some basic blood work done.    BRCA2 pos    REVIEW OF SYSTEMS:  GENERAL: No fever, chills, fatigability or weight loss.  SKIN: No rashes, itching or changes in color or texture of skin.  HEAD: No recent head trauma.  EYES: Visual acuity fine. No photophobia, ocular pain or diplopia.  EARS: Denies ear pain, discharge or vertigo.  NOSE: No loss of smell, no epistaxis or postnasal drip.  MOUTH & THROAT: No hoarseness or change in voice. No excessive gum bleeding.  NODES: Denies swollen glands.  CHEST: Denies BRANCH, cyanosis, wheezing, cough and sputum production.  CARDIOVASCULAR: Denies chest pain, PND, orthopnea or reduced exercise tolerance.  ABDOMEN: Appetite fine. No weight loss. Denies diarrhea, abdominal pain, hematemesis or blood in stool.  URINARY: No flank pain, dysuria or hematuria.  PERIPHERAL VASCULAR: No claudication or cyanosis.  MUSCULOSKELETAL: No joint stiffness or swelling. Denies back pain.  NEUROLOGIC: No history of seizures, paralysis, alteration of gait or coordination.    SOCIAL HISTORY: The patient does not smoke.  The patient consumes alcohol socially.  The patient is single.    PHYSICAL EXAMINATION:   Blood pressure 100/64, pulse 68, height 5' 6" (1.676 m), weight 58.6 kg (129 lb 1.3 oz), last menstrual period 06/29/2025, SpO2 97%.  APPEARANCE: Well nourished, well developed, in no acute distress.    HEAD: Normocephalic, atraumatic.  EYES: PERRL. EOMI.  Conjunctivae without injection and  anicteric  NOSE: Mucosa pink. Airway clear.  MOUTH & THROAT: No tonsillar enlargement. No pharyngeal erythema or exudate. No stridor.  NECK: Supple.   NODES: No cervical, axillary or inguinal lymph node enlargement.  CHEST: Lungs clear to auscultation.  No retractions are " noted.  No rales or rhonchi are present.  CARDIOVASCULAR: Normal S1, S2. No rubs, murmurs or gallops.  ABDOMEN: Bowel sounds normal. Not distended. Soft. No tenderness or masses.  No ascites is noted.  MUSCULOSKELETAL:  There is no clubbing, cyanosis, or edema of the extremities x4.  There is full range of motion of the lumbar spine.  There is full range of motion of the extremities x4.  There is no deformity noted.    NEUROLOGIC:       Normal speech development.      Hearing normal.      Normal gait.      Motor and sensory exams grossly normal.  PSYCHIATRIC: Patient is alert and oriented x3.  Thought processes are all normal.  There is no homicidality.  There is no suicidality.  There is no evidence of psychosis.    LABORATORY/RADIOLOGY:   Chart reviewed.  We will update blood work today.    ASSESSMENT:   Annual   BRCA2 positive    PLAN:  We will follow-up blood work which we expect to be normal.    Return to clinic in one year.

## 2025-07-15 ENCOUNTER — LAB VISIT (OUTPATIENT)
Dept: LAB | Facility: OTHER | Age: 35
End: 2025-07-15
Attending: INTERNAL MEDICINE
Payer: COMMERCIAL

## 2025-07-15 DIAGNOSIS — R14.0 ABDOMINAL BLOATING: Primary | ICD-10-CM

## 2025-07-15 DIAGNOSIS — R10.9 ABDOMINAL CRAMPS: ICD-10-CM

## 2025-07-15 LAB
ABSOLUTE EOSINOPHIL (OHS): 0.14 K/UL
ABSOLUTE MONOCYTE (OHS): 0.49 K/UL (ref 0.3–1)
ABSOLUTE NEUTROPHIL COUNT (OHS): 2.5 K/UL (ref 1.8–7.7)
ALBUMIN SERPL BCP-MCNC: 4.7 G/DL (ref 3.5–5.2)
ALP SERPL-CCNC: 43 UNIT/L (ref 40–150)
ALT SERPL W/O P-5'-P-CCNC: 7 UNIT/L (ref 10–44)
ANION GAP (OHS): 9 MMOL/L (ref 8–16)
AST SERPL-CCNC: 13 UNIT/L (ref 11–45)
BASOPHILS # BLD AUTO: 0.06 K/UL
BASOPHILS NFR BLD AUTO: 1.2 %
BILIRUB SERPL-MCNC: 0.2 MG/DL (ref 0.1–1)
BUN SERPL-MCNC: 10 MG/DL (ref 6–20)
CALCIUM SERPL-MCNC: 9.6 MG/DL (ref 8.7–10.5)
CHLORIDE SERPL-SCNC: 103 MMOL/L (ref 95–110)
CHOLEST SERPL-MCNC: 236 MG/DL (ref 120–199)
CHOLEST/HDLC SERPL: 4.5 {RATIO} (ref 2–5)
CO2 SERPL-SCNC: 27 MMOL/L (ref 23–29)
CREAT SERPL-MCNC: 0.8 MG/DL (ref 0.5–1.4)
CRP SERPL-MCNC: 0.1 MG/L
EAG (OHS): 94 MG/DL (ref 68–131)
ERYTHROCYTE [DISTWIDTH] IN BLOOD BY AUTOMATED COUNT: 11.2 % (ref 11.5–14.5)
GFR SERPLBLD CREATININE-BSD FMLA CKD-EPI: >60 ML/MIN/1.73/M2
GLUCOSE SERPL-MCNC: 81 MG/DL (ref 70–110)
HBA1C MFR BLD: 4.9 % (ref 4–5.6)
HCT VFR BLD AUTO: 37.6 % (ref 37–48.5)
HDLC SERPL-MCNC: 52 MG/DL (ref 40–75)
HDLC SERPL: 22 % (ref 20–50)
HGB BLD-MCNC: 12.9 GM/DL (ref 12–16)
IMM GRANULOCYTES # BLD AUTO: 0.01 K/UL (ref 0–0.04)
IMM GRANULOCYTES NFR BLD AUTO: 0.2 % (ref 0–0.5)
LDLC SERPL CALC-MCNC: 163.2 MG/DL (ref 63–159)
LIPASE SERPL-CCNC: 47 U/L (ref 4–60)
LYMPHOCYTES # BLD AUTO: 2.01 K/UL (ref 1–4.8)
MCH RBC QN AUTO: 30.8 PG (ref 27–31)
MCHC RBC AUTO-ENTMCNC: 34.3 G/DL (ref 32–36)
MCV RBC AUTO: 90 FL (ref 82–98)
NONHDLC SERPL-MCNC: 184 MG/DL
NUCLEATED RBC (/100WBC) (OHS): 0 /100 WBC
PLATELET # BLD AUTO: 247 K/UL (ref 150–450)
PMV BLD AUTO: 11.4 FL (ref 9.2–12.9)
POTASSIUM SERPL-SCNC: 4.8 MMOL/L (ref 3.5–5.1)
PROT SERPL-MCNC: 8.2 GM/DL (ref 6–8.4)
RBC # BLD AUTO: 4.19 M/UL (ref 4–5.4)
RELATIVE EOSINOPHIL (OHS): 2.7 %
RELATIVE LYMPHOCYTE (OHS): 38.6 % (ref 18–48)
RELATIVE MONOCYTE (OHS): 9.4 % (ref 4–15)
RELATIVE NEUTROPHIL (OHS): 47.9 % (ref 38–73)
SODIUM SERPL-SCNC: 139 MMOL/L (ref 136–145)
TRIGL SERPL-MCNC: 104 MG/DL (ref 30–150)
WBC # BLD AUTO: 5.21 K/UL (ref 3.9–12.7)

## 2025-07-15 PROCEDURE — 86258 DGP ANTIBODY EACH IG CLASS: CPT | Mod: 59

## 2025-07-15 PROCEDURE — 85025 COMPLETE CBC W/AUTO DIFF WBC: CPT

## 2025-07-15 PROCEDURE — 86140 C-REACTIVE PROTEIN: CPT

## 2025-07-15 PROCEDURE — 80053 COMPREHEN METABOLIC PANEL: CPT

## 2025-07-15 PROCEDURE — 83690 ASSAY OF LIPASE: CPT

## 2025-07-15 PROCEDURE — 36415 COLL VENOUS BLD VENIPUNCTURE: CPT

## 2025-07-15 NOTE — PROGRESS NOTES
Subjective     Patient ID: Haley Henriquez is a 35 y.o. female.    Chief Complaint: No chief complaint on file.    HPI    Presents today for follow up BRCA2.   Has a Kyleena IUD placed 5/2024 and copper IUD removed     6/2025 Pelvic US  Right ovarian cyst with homogeneous low level internal echoes consistent with either hemorrhagic cyst or possibly endometrioma.  Recommend follow-up in 6-12 weeks to ensure stability or resolution.  Intrauterine device in good position in the endometrial canal.     13     Last breast imaging MRI 1/2024.  Follows with SONYA Tobar     Pap/HPV 5/2022 negative.     Endorses chronic pelvic pain since our last visit.      ______________________________    Referral history:  Referred by Dr. Dillon for BRCA2 mutation and ovarian cancer risk reduction management.      Recently moved from [facility]. She was tested for BRCA after a positive test in her family in 2013. She has 1 aunt with breast cancer and mother with ovarian cancer.  Also a patient of Dr Oliva. P0 and desires fertility. Currently on OCPs for contraception.       12/2019 pap and HPV negative.     9/2022   Pelvic US unremarkable   14     3/2023  Pelvic US unremarkable     12    Review of Systems   Constitutional:  Negative for appetite change, chills, diaphoresis, fatigue, fever and unexpected weight change.   Respiratory:  Negative for chest tightness, shortness of breath and wheezing.    Cardiovascular:  Negative for chest pain, palpitations and leg swelling.   Gastrointestinal:  Negative for abdominal pain, constipation, diarrhea, nausea and vomiting.   Genitourinary:  Positive for pelvic pain. Negative for difficulty urinating, dysuria, flank pain, frequency, hematuria, urgency, vaginal bleeding, vaginal discharge and vaginal pain.   Musculoskeletal:  Negative for back pain.   Integumentary:  Negative for color change.   Neurological:  Negative for dizziness, light-headedness, numbness and headaches.    Psychiatric/Behavioral:  Negative for agitation. The patient is not nervous/anxious.      LMP 06/29/2025        Objective     Physical Exam  Vitals and nursing note reviewed. Exam conducted with a chaperone present.   Constitutional:       Appearance: Normal appearance. She is not ill-appearing.   HENT:      Head: Normocephalic and atraumatic.   Eyes:      General: No scleral icterus.  Pulmonary:      Effort: No respiratory distress.      Breath sounds: Normal breath sounds.   Chest:   Breasts:     Breasts are symmetrical.      Right: Normal. No mass or skin change.      Left: Normal. No mass or skin change.          Comments: Normal breast exam. Fibrocystic changes bilaterally, most palpably prominent bilateral lower inner quadrants  Abdominal:      General: There is no distension.      Palpations: Abdomen is soft. There is no mass.      Tenderness: There is no abdominal tenderness.      Hernia: No hernia is present.   Genitourinary:     Exam position: Lithotomy position.      Labia:         Right: No lesion.         Left: No lesion.       Vagina: Normal. No bleeding.      Cervix: Normal.      Uterus: Normal.       Adnexa: Right adnexa normal and left adnexa normal.      Comments: Mobile pelvic structures. No discretely palpable masses. IUD strings visible.  A female staff member was present to chaperone during the pelvic exam.       Musculoskeletal:         General: Normal range of motion.      Cervical back: Normal range of motion.      Right lower leg: No edema.      Left lower leg: No edema.   Skin:     General: Skin is warm and dry.      Coloration: Skin is not pale.      Findings: No erythema.   Neurological:      General: No focal deficit present.      Mental Status: She is alert and oriented to person, place, and time.   Psychiatric:         Mood and Affect: Mood normal.         Behavior: Behavior normal.            Assessment and Plan     {There are no diagnoses linked to this encounter. (Refresh or  delete this SmartLink)}      No obvious evidence of disease on today's exam  F/u  today  Short interval follow up pelvic ultrasound for noted likely physiologic hemorrhagic cyst.-- VV to follow to review results.   Chronic pelvic pain, may consider changing back to OCPs for cyst formation suppression.   Recommend continued follow up until ready for risk reducing surgery.   RTC 6 months or sooner if needed.      I spent approximately 30 minutes reviewing the available records and evaluating the patient, out of which over 50% of the time was spent face to face with the patient in counseling and coordinating this patient's care.            No follow-ups on file.

## 2025-07-15 NOTE — PROGRESS NOTES
Subjective     Patient ID: Haley Henriquez is a 35 y.o. female.    Chief Complaint: Follow-up (IUD Removal )    HPI    Presents today for follow up BRCA2.   Has a Kyleena IUD placed 5/2024 and copper IUD removed     6/2025 Pelvic US  Right ovarian cyst with homogeneous low level internal echoes consistent with either hemorrhagic cyst or possibly endometrioma.  Recommend follow-up in 6-12 weeks to ensure stability or resolution.  Intrauterine device in good position in the endometrial canal.     13     Last breast imaging MRI 1/2024.  Follows with SONYA Tobar     Pap/HPV 5/2022 negative.     Endorses chronic pelvic pain and continued cycles with IUD. Here for IUD removal and to discuss resuming OCPs     ______________________________    Referral history:  Referred by Dr. Dillon for BRCA2 mutation and ovarian cancer risk reduction management.      Recently moved from [facility]. She was tested for BRCA after a positive test in her family in 2013. She has 1 aunt with breast cancer and mother with ovarian cancer.  Also a patient of Dr Oliva. P0 and desires fertility. Currently on OCPs for contraception.       12/2019 pap and HPV negative.     9/2022   Pelvic US unremarkable   14     3/2023  Pelvic US unremarkable     12    Review of Systems   Constitutional:  Negative for appetite change, chills, diaphoresis, fatigue, fever and unexpected weight change.   Respiratory:  Negative for chest tightness, shortness of breath and wheezing.    Cardiovascular:  Negative for chest pain, palpitations and leg swelling.   Gastrointestinal:  Negative for abdominal pain, constipation, diarrhea, nausea and vomiting.   Genitourinary:  Positive for menstrual problem and pelvic pain. Negative for difficulty urinating, dysuria, flank pain, frequency, hematuria, urgency, vaginal bleeding, vaginal discharge and vaginal pain.   Musculoskeletal:  Negative for back pain.   Integumentary:  Negative for color change.    Neurological:  Negative for dizziness, light-headedness, numbness and headaches.   Psychiatric/Behavioral:  Negative for agitation. The patient is not nervous/anxious.      /62 (BP Location: Left arm, Patient Position: Sitting)   Pulse 61   Resp 18   Wt 59.2 kg (130 lb 8 oz)   LMP 06/29/2025   BMI 21.06 kg/m²        Objective     Physical Exam  Vitals and nursing note reviewed. Exam conducted with a chaperone present.   Constitutional:       Appearance: Normal appearance. She is normal weight. She is not ill-appearing.   HENT:      Head: Normocephalic and atraumatic.   Eyes:      General: No scleral icterus.  Pulmonary:      Effort: Pulmonary effort is normal. No respiratory distress.   Abdominal:      General: There is no distension.      Palpations: Abdomen is soft.   Genitourinary:     General: Normal vulva.      Exam position: Lithotomy position.      Labia:         Right: No lesion.         Left: No lesion.       Vagina: Normal. No vaginal discharge or bleeding.      Cervix: Normal.      Comments: IUD strings visible. Easily grasped with ring forceps and IUD removed intact.     A female staff member was present to chaperone during the pelvic exam.       Musculoskeletal:         General: Normal range of motion.      Cervical back: Normal range of motion.      Right lower leg: No edema.      Left lower leg: No edema.   Skin:     General: Skin is warm and dry.      Coloration: Skin is not pale.      Findings: No erythema or rash.   Neurological:      General: No focal deficit present.      Mental Status: She is alert and oriented to person, place, and time.   Psychiatric:         Mood and Affect: Mood normal.         Behavior: Behavior normal.            Assessment and Plan     1. BRCA2 positive  Overview:  Breast cancer risk (59%%): Followed by NISHA Tobar NP (hereditary & high risk clinic)  Ovarian cancer risk (13-30%): Followed by Dr. Clark (gyn onc)  Pancreatic cancer risk (5-10%): Refer to high risk  pancreas clinic ([facility]) for screening at age 50  Cutaneous melanoma risk (2.5%): Consult with Dr. Wong 9/2025  Uveal melanoma risk (0.2%): Annual eye exam by an optometrist    Orders:  -     norethindrone-e.estradioL-iron (LO LOESTRIN FE) 1 mg-10 mcg (24)/10 mcg (2) Tab; Take 1 tablet by mouth once daily.  Dispense: 28 tablet; Refill: 5    2. Encounter for initial prescription of contraceptive pills        No obvious evidence of disease on today's exam   normal and stable  Chronic pelvic pain, IUD removed. Resume OCPs for cyst formation suppression.   Recommend continued follow up until ready for risk reducing surgery.   RTC in Sept for US follow up and virtual with Dr Eduardo CARR spent approximately 30 minutes reviewing the available records and evaluating the patient, out of which over 50% of the time was spent face to face with the patient in counseling and coordinating this patient's care.                  No follow-ups on file.

## 2025-07-16 ENCOUNTER — OFFICE VISIT (OUTPATIENT)
Dept: GYNECOLOGIC ONCOLOGY | Facility: CLINIC | Age: 35
End: 2025-07-16
Payer: COMMERCIAL

## 2025-07-16 VITALS
BODY MASS INDEX: 21.06 KG/M2 | SYSTOLIC BLOOD PRESSURE: 109 MMHG | HEART RATE: 61 BPM | WEIGHT: 130.5 LBS | DIASTOLIC BLOOD PRESSURE: 62 MMHG | RESPIRATION RATE: 18 BRPM

## 2025-07-16 DIAGNOSIS — Z15.09 BRCA2 POSITIVE: Primary | ICD-10-CM

## 2025-07-16 DIAGNOSIS — Z30.011 ENCOUNTER FOR INITIAL PRESCRIPTION OF CONTRACEPTIVE PILLS: ICD-10-CM

## 2025-07-16 DIAGNOSIS — Z15.01 BRCA2 POSITIVE: Primary | ICD-10-CM

## 2025-07-16 PROCEDURE — 99999 PR PBB SHADOW E&M-EST. PATIENT-LVL III: CPT | Mod: PBBFAC,,, | Performed by: NURSE PRACTITIONER

## 2025-07-16 RX ORDER — NORETHINDRONE ACETATE AND ETHINYL ESTRADIOL, ETHINYL ESTRADIOL AND FERROUS FUMARATE 1MG-10(24)
1 KIT ORAL DAILY
Qty: 28 TABLET | Refills: 5 | Status: SHIPPED | OUTPATIENT
Start: 2025-07-16 | End: 2026-07-16

## 2025-07-18 LAB
W GLIADIN AB IGA, DEAMIDATED: 1 U/ML
W GLIADIN AB IGG, DEAMIDATED: <0.6 U/ML
W IMMUNOGLOBULIN A (IGA): 299 MG/DL
W TISSUE TRANSGLUTAMINASE IGA AB: 0.6 U/ML
W TISSUE TRANSGLUTAMINASE IGG: <0.6 U/ML

## 2025-07-28 DIAGNOSIS — Z15.09 BRCA2 POSITIVE: Primary | ICD-10-CM

## 2025-07-28 DIAGNOSIS — Z91.89 AT HIGH RISK FOR BREAST CANCER: ICD-10-CM

## 2025-07-28 DIAGNOSIS — Z12.31 ENCOUNTER FOR SCREENING MAMMOGRAM FOR BREAST CANCER: ICD-10-CM

## 2025-07-28 DIAGNOSIS — R92.343 EXTREMELY DENSE TISSUE OF BOTH BREASTS ON MAMMOGRAPHY: ICD-10-CM

## 2025-07-28 DIAGNOSIS — Z15.01 BRCA2 POSITIVE: Primary | ICD-10-CM

## 2025-07-28 DIAGNOSIS — Z12.39 BREAST CANCER SCREENING, HIGH RISK PATIENT: ICD-10-CM

## 2025-07-28 DIAGNOSIS — Z80.3 FAMILY HISTORY OF BREAST CANCER: ICD-10-CM

## 2025-09-03 ENCOUNTER — OFFICE VISIT (OUTPATIENT)
Dept: DERMATOLOGY | Facility: CLINIC | Age: 35
End: 2025-09-03
Payer: COMMERCIAL

## 2025-09-03 ENCOUNTER — HOSPITAL ENCOUNTER (OUTPATIENT)
Dept: RADIOLOGY | Facility: HOSPITAL | Age: 35
Discharge: HOME | End: 2025-09-03
Attending: OBSTETRICS & GYNECOLOGY
Payer: COMMERCIAL

## 2025-09-03 DIAGNOSIS — Z15.01 BRCA2 POSITIVE: ICD-10-CM

## 2025-09-03 DIAGNOSIS — Z12.83 SCREENING EXAM FOR SKIN CANCER: ICD-10-CM

## 2025-09-03 DIAGNOSIS — D22.9 MULTIPLE BENIGN NEVI: Primary | ICD-10-CM

## 2025-09-03 DIAGNOSIS — N83.209 CYST OF OVARY, UNSPECIFIED LATERALITY: ICD-10-CM

## 2025-09-03 DIAGNOSIS — Z15.09 BRCA2 POSITIVE: ICD-10-CM

## 2025-09-03 PROCEDURE — 99999 PR PBB SHADOW E&M-EST. PATIENT-LVL II: CPT | Mod: PBBFAC,,, | Performed by: STUDENT IN AN ORGANIZED HEALTH CARE EDUCATION/TRAINING PROGRAM

## 2025-09-03 PROCEDURE — 76830 TRANSVAGINAL US NON-OB: CPT | Mod: TC

## 2025-09-03 PROCEDURE — 99203 OFFICE O/P NEW LOW 30 MIN: CPT | Mod: S$GLB,,, | Performed by: STUDENT IN AN ORGANIZED HEALTH CARE EDUCATION/TRAINING PROGRAM

## (undated) DEVICE — DRESSING XEROFORM FOIL PK 1X8

## (undated) DEVICE — SOL IRR NACL .9% 3000ML

## (undated) DEVICE — GLOVE BIOGEL SKINSENSE PI 7.0

## (undated) DEVICE — SUT MONOCRYL 4-0 PS-2

## (undated) DEVICE — TOURNIQUET SB QC SP 34X4IN

## (undated) DEVICE — SEE MEDLINE ITEM 157150

## (undated) DEVICE — GAUZE SPONGE 4X4 12PLY

## (undated) DEVICE — TUBE SET INFLOW/OUTFLOW

## (undated) DEVICE — GLOVE BIOGEL SKINSENSE PI 8.0

## (undated) DEVICE — PAD ELECTRODE STER 1.5X3

## (undated) DEVICE — SKIN MARKER DEVON 160

## (undated) DEVICE — ADHESIVE MASTISOL VIAL 48/BX

## (undated) DEVICE — UNDERGLOVES BIOGEL PI SIZE 8

## (undated) DEVICE — DRAPE STERI INSTRUMENT 1018

## (undated) DEVICE — UNDERGLOVES BIOGEL PI SZ 7 LF

## (undated) DEVICE — Device

## (undated) DEVICE — GLOVE BIOGEL SKINSENSE PI 7.5

## (undated) DEVICE — PADDING CAST SPECIALIST 6X4YD

## (undated) DEVICE — SHAVER ULTRAFFR 4.2MM

## (undated) DEVICE — APPLICATOR CHLORAPREP ORN 26ML

## (undated) DEVICE — DRAPE STERI U-SHAPED 47X51IN

## (undated) DEVICE — SYR 30CC LUER LOCK

## (undated) DEVICE — SOL 9P NACL IRR PIC IL

## (undated) DEVICE — UNDERGLOVES BIOGEL PI SIZE 7.5

## (undated) DEVICE — NDL 18GA X1 1/2 REG BEVEL

## (undated) DEVICE — PAD ABD 8X10 STERILE

## (undated) DEVICE — CLOSURE SKIN STERI STRIP 1/2X4

## (undated) DEVICE — SEE MEDLINE ITEM 157169

## (undated) DEVICE — PAD COLD THERAPY KNEE WRAP ON